# Patient Record
Sex: MALE | Race: WHITE | NOT HISPANIC OR LATINO | Employment: FULL TIME | URBAN - METROPOLITAN AREA
[De-identification: names, ages, dates, MRNs, and addresses within clinical notes are randomized per-mention and may not be internally consistent; named-entity substitution may affect disease eponyms.]

---

## 2017-02-27 ENCOUNTER — TELEPHONE (OUTPATIENT)
Dept: NEUROLOGY | Facility: HOSPITAL | Age: 37
End: 2017-02-27

## 2017-02-27 NOTE — TELEPHONE ENCOUNTER
Spoke w/ pt and wife, appt scheduled for 3/29 at 1:30. Requesting earlier appt. Will route message to Dr. Todd for approval.

## 2017-02-27 NOTE — TELEPHONE ENCOUNTER
----- Message from Jessica Gupta sent at 2/24/2017  3:09 PM CST -----  Contact: Pt wife  Pt wife called to schedule an appt for the the above pt due to digestive issues.     Pt wife stated pt is losing weight and she feels he should be seen as soon as possible .     Pt wife can be contacted at [phone]

## 2017-03-13 ENCOUNTER — TELEPHONE (OUTPATIENT)
Dept: NEUROLOGY | Facility: HOSPITAL | Age: 37
End: 2017-03-13

## 2017-03-13 NOTE — TELEPHONE ENCOUNTER
----- Message from Radha Jacinto sent at 3/13/2017 12:29 PM CDT -----  GI - Patients insurance will not be effective until 4/1 but we do not have an appointment until 4/12. Please reschedule the patient earlier but after 4/1 if possible. Please call back grecia Torrefany at [phone].

## 2017-04-12 ENCOUNTER — OFFICE VISIT (OUTPATIENT)
Dept: NEUROLOGY | Facility: HOSPITAL | Age: 37
End: 2017-04-12
Attending: INTERNAL MEDICINE
Payer: COMMERCIAL

## 2017-04-12 VITALS
SYSTOLIC BLOOD PRESSURE: 109 MMHG | TEMPERATURE: 98 F | HEIGHT: 67 IN | DIASTOLIC BLOOD PRESSURE: 72 MMHG | WEIGHT: 128 LBS | HEART RATE: 59 BPM | BODY MASS INDEX: 20.09 KG/M2

## 2017-04-12 DIAGNOSIS — R63.4 WEIGHT LOSS: Primary | ICD-10-CM

## 2017-04-12 PROCEDURE — 99215 OFFICE O/P EST HI 40 MIN: CPT | Performed by: INTERNAL MEDICINE

## 2017-04-12 RX ORDER — ASCORBIC ACID 1000 MG
250 TABLET ORAL 3 TIMES DAILY
COMMUNITY
End: 2018-08-26

## 2017-04-12 RX ORDER — IBUPROFEN 100 MG/5ML
1000 SUSPENSION, ORAL (FINAL DOSE FORM) ORAL DAILY
COMMUNITY
End: 2018-08-26

## 2017-04-12 RX ORDER — PNV NO.95/FERROUS FUM/FOLIC AC 28MG-0.8MG
1000 TABLET ORAL 2 TIMES DAILY
COMMUNITY
End: 2018-08-26

## 2017-04-12 RX ORDER — SELENIUM 50 MCG
200 TABLET ORAL DAILY
COMMUNITY
End: 2018-08-26

## 2017-04-12 NOTE — MR AVS SNAPSHOT
[facility]  [address] [place] [address]  Phone: [phone]  Fax: [phone]                  Casper Fenton   2017 9:45 AM   Office Visit    Description:  Male : 1980   Provider:  Randell Todd MD   Department:  [facility]           Reason for Visit     Follow-up                To Do List           Goals (5 Years of Data)     None      [facility]     [facility] Care Line -  Assistance  Unless otherwise directed by your provider, please contact [facility], our nurse care line that is available for  assistance.     Registered nurses in the [facility] provide: appointment scheduling, clinical advisement, health education, and other advisory services.  Call: [phone] (toll free)               Medications           Message regarding Medications     Verify the changes and/or additions to your medication regime listed below are the same as discussed with your clinician today.  If any of these changes or additions are incorrect, please notify your healthcare provider.             Verify that the below list of medications is an accurate representation of the medications you are currently taking.  If none reported, the list may be blank. If incorrect, please contact your healthcare provider. Carry this list with you in case of emergency.           Current Medications     ACETYLCYSTEINE (N-ACETYL-L-CYSTEINE MISC) by Misc.(Non-Drug; Combo Route) route once daily.    ascorbic acid, vitamin C, (VITAMIN C) 1000 MG tablet Take 1,000 mg by mouth once daily.    cetirizine 10 mg TbDL Take by mouth as needed.    ENZYMES,DIGESTIVE (DIGESTIVE ENZYMES ORAL) Take by mouth 3 (three) times daily.    milk thistle 175 mg tablet Take 250 mg by mouth 3 (three) times daily.    multivitamin (ONE DAILY MULTIVITAMIN) per tablet Take 1 tablet by mouth once daily.    selenium 50 mcg Tab Take 200 mcg by mouth once daily.     "testosterone cypionate (DEPOTESTOTERONE CYPIONATE) 200 mg/mL injection Inject 200 mg into the muscle every 14 (fourteen) days.    VITAMIN B COMPLEX/FOLIC ACID (B COMPLEX 100 ORAL) Take by mouth once daily.    vitamin E 1000 UNIT capsule Take 1,000 Units by mouth 2 (two) times daily.           Clinical Reference Information           Your Vitals Were     BP Pulse Temp Height Weight BMI    109/72 59 97.9 °F (36.6 °C) (Oral) 5' 7" (1.702 m) 58.1 kg (128 lb) 20.05 kg/m2      Blood Pressure          Most Recent Value    BP  109/72      Allergies as of 4/12/2017     Kiwi (Actinidia Chinensis)      Immunizations Administered on Date of Encounter - 4/12/2017     None      Instructions    RX sent to pharmacy        Language Assistance Services     ATTENTION: Language assistance services are available, free of charge. Please call [phone].      ATENCIÓN: Si habla español, tiene a major disposición servicios gratuitos de asistencia lingüística. Llame al [phone].     [facility] Ý: N?u b?n nói Ti?ng Vi?t, có các d?ch v? h? tr? ngôn ng? mi?n phí dành cho b?n. G?i s? [phone].         [facility] complies with applicable Federal civil rights laws and does not discriminate on the basis of race, color, national origin, age, disability, or sex.        "

## 2017-04-12 NOTE — PROGRESS NOTES
"[facility]    CC: weight loss    HPI   Mr. Fenton is a 36 year old man presenting with persistent, significant, painless weight loss not associated with diarrhea and early satiety. He has a history of Savana-en-Y for congenital jejunal atresia but has not had problems until the last few years when he started losing weight and even lost his job as a  because of decreased exercise tolerance. He was always 180 but is down to 128.     He denies abdominal pain, nausea, or vomiting. He notes that he gained some weight since the last visit with initiation of testosterone, but since lost his insurance and had to stop. He started losing weight again. He recently restarted testosterone 2 months ago and he notices an increase in appetite shortly after the injections. He feels that he is physically unable to eat more at eat meal because he gets full, but is still eating 4 meals a day. About 1 month ago, he started having daily diarrhea that is non-bloody and not associated with cramping. If he doesn't eat, the diarrhea stops, and it rarely occurs at night.     His diet includes milk daily, about 5 cokes a day, and wheat. He uses chewing tobacco daily. He denies NSAIDs or alcohol.     PMH  Celiac Disease  Hypogonadism  Jejunal atresia s/p Savana-en-Y  Tobacco use  Remote heavy alcohol use    Review of Systems  General ROS: no chills, fever Positive for weight loss, seasonal allergies, decreased exercise tolerance  Cardiovascular ROS: no chest pain or dyspnea on exertion  Gastrointestinal ROS: no abdominal pain or black/ bloody stools Positive for early satiety, non-bloody diarrhea    Physical Examination  /72  Pulse (!) 59  Temp 97.9 °F (36.6 °C) (Oral)   Ht 5' 7" (1.702 m)  Wt 58.1 kg (128 lb)  BMI 20.05 kg/m2  General appearance: alert, cooperative, no distress, thin but not cachectic  HENT: Normocephalic, atraumatic, neck symmetrical, no nasal discharge  Lungs: clear to auscultation bilaterally, " slight rhonchi in posterior upper lung fields that cleared with cough  Heart: regular rate and rhythm without rub; normal S1/S2 appreciated  Abdomen: soft, non-tender; bowel sounds normoactive; no organomegaly, well healed midline and RUQ scar   Extremities: extremities symmetric; no clubbing, cyanosis, or edema, nail changes noted  Neurologic: Alert and oriented X 3, normal strength, normal coordination and gait    Labs:  2/25/16  WBC 9.06  H/H 14.2/43.2  INR 1.5  AST 68, ,     Imaging:  No new imaging recently but I have personally reviewed the following images.    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 He had weight gain in the past with testosterone, stopped it and lost weight, but has recently resumed it. He is taking supplemental pancreatic enzymes OTC and many vitamins. He is also drinking milk daily as well as 5 cokes daily. His diarrhea improves when he doesn't eat and he complains of early satiety which has started in the last year. Weight self-reported as 180 a few years ago, then 140 at last visit and 128 at this visit.    Impression:   Recurrent weight loss - likely related to a combination of malabsorption related to previous intestinal surgery vs lactose intolerance plus early satiety plus chronic/recurrent anorexia     Recurrent diarrhea suspicious for malabsorption - due to relative pancreatic exocrine insufficiency vs lactose intolerance vs celiac?        Plan:  - instructed to keep a lactose free diet for 2 weeks and call with an  update  - will give prescription for pancreatic enzyme replacement 30,000 units per meal to replace supplemental enzymes  - instructed to take a liquid multivitamin once daily instead of large number of various vitamin supplements   - minimize nicotine use if possible  - continue testosterone supplements as this has improved his weight loss in the past   Reassess in two weeks. If his diarrhea improves on a lactose free diet, he should avoid lactose. If his diarrhea fails to improve, I will plan repeat testing for celiac disease (previous positive HLA DQ2 and reported positive anti TTG IgG) including both IgA and IgG testing for anti-TTG.   If his symptom of early satiety persists, I will consider an EGD to evaluate for stenosis of the gastrojejunostomy then potentially an appetite stimulant as next step       Randell Todd MD   [address], [address]   [place] [address] [phone]

## 2017-04-13 ENCOUNTER — TELEPHONE (OUTPATIENT)
Dept: NEUROLOGY | Facility: HOSPITAL | Age: 37
End: 2017-04-13

## 2017-04-13 NOTE — TELEPHONE ENCOUNTER
----- Message from Sandra Ferrell sent at 4/13/2017 10:09 AM CDT -----  Contact: Patients grecia SANFORD- Patient would like a recommendation for a neurologist. Brittaney can be reached at [phone].

## 2017-04-13 NOTE — TELEPHONE ENCOUNTER
Gave her information for Neurologist in the [facility] via [facility] web. She also states rx for pancreatic enzymes were not sent to pharmacy. Message sent to Dr Todd

## 2017-04-18 ENCOUNTER — TELEPHONE (OUTPATIENT)
Dept: NEUROLOGY | Facility: CLINIC | Age: 37
End: 2017-04-18

## 2017-04-18 NOTE — TELEPHONE ENCOUNTER
----- Message from Thomas Ochoa sent at 4/13/2017 11:53 AM CDT -----  Contact: Brittaney ( spouse ) [phone]  Caller is calling to schedule a NP appt, patient is being referred by Dr. Randell Todd. pls call

## 2017-06-15 ENCOUNTER — TELEPHONE (OUTPATIENT)
Dept: NEUROLOGY | Facility: HOSPITAL | Age: 37
End: 2017-06-15

## 2017-06-15 NOTE — TELEPHONE ENCOUNTER
Wife, Brittaney, wants to know if he can take Valarian Root. With it cause problems with enzymes presently taking.  Pt recently diagnosed with PTSD.  Doing well on enzymes, gaining a little weight.  Also taking otc multivitamins.

## 2017-06-15 NOTE — TELEPHONE ENCOUNTER
----- Message from Radha Jacinto sent at 6/15/2017  8:34 AM CDT -----  GI- Patients wife Brittaney is calling in regards to her husbands status on the enzymes. Please call back at [phone] to assist.

## 2017-06-19 ENCOUNTER — TELEPHONE (OUTPATIENT)
Dept: NEUROLOGY | Facility: HOSPITAL | Age: 37
End: 2017-06-19

## 2017-06-19 NOTE — TELEPHONE ENCOUNTER
----- Message from Sandra Ferrell sent at 6/19/2017  9:55 AM CDT -----  Contact: Patients wife, Brittaney  GI- Patients wife, rBittaney is calling back again to see if the patient can take Zelarian Root in addition to the enzymes the patient is on.  Patient left a messaged Thursday morning and would like a call back today. Please call the patient back as soon as possible at [phone].

## 2017-06-19 NOTE — TELEPHONE ENCOUNTER
Brittaney notified its okay to take Mallorie Root per Dr. Todd.  Brittaney acknowledged understanding.

## 2018-05-23 ENCOUNTER — OFFICE VISIT (OUTPATIENT)
Dept: NEUROLOGY | Facility: HOSPITAL | Age: 38
End: 2018-05-23
Attending: INTERNAL MEDICINE
Payer: MEDICAID

## 2018-05-23 VITALS
DIASTOLIC BLOOD PRESSURE: 64 MMHG | BODY MASS INDEX: 19.96 KG/M2 | HEIGHT: 67 IN | HEART RATE: 63 BPM | TEMPERATURE: 98 F | SYSTOLIC BLOOD PRESSURE: 104 MMHG | WEIGHT: 127.19 LBS

## 2018-05-23 DIAGNOSIS — G62.9 PERIPHERAL POLYNEUROPATHY: Primary | ICD-10-CM

## 2018-05-23 PROCEDURE — 99214 OFFICE O/P EST MOD 30 MIN: CPT | Performed by: INTERNAL MEDICINE

## 2018-05-23 NOTE — PATIENT INSTRUCTIONS
Referral to [facility] Dr. Randell Berry, clinic will contact.      New medication sent to your pharmacy.

## 2018-05-23 NOTE — PROGRESS NOTES
"[facility]    CC: weight loss     HPI 35 y.o. 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  He reports 2-3 loose to soft bowel movements daily while taking Creon supplementation. Creon improved the number of bowel movements from 4-6 to about 2-3. He did not try lactose free diet and has not seen any changes in loose stool with dairy products. He also takes vitamin B12 and multivitamin. He states that it has been difficult for him to gain weight despite nutritional supplementation. He has tried vitamin shakes in the past but only notes significant improvement after receiving testosterone supplementation. He does states mild neuropathy in hands and difficulty with steady gait and ambulation.     Past Medical History:   Diagnosis Date    Allergy     Asthma     Short bowel syndrome      Review of Systems  General ROS: negative for chills, fever or weight loss, +fatigue, +lethargy,   Cardiovascular ROS: no chest pain or dyspnea on exertion  Gastrointestinal ROS: no abdominal pain, +loose stools improved on enzyme supplementation, no blood in stool    Physical Examination  /64   Pulse 63   Temp 97.7 °F (36.5 °C) (Oral)   Ht 5' 7" (1.702 m)   Wt 57.7 kg (127 lb 3.3 oz)   BMI 19.92 kg/m²   General appearance: alert, cooperative, no distress, thin, cachectic male  HENT: Normocephalic, atraumatic, neck symmetrical, no nasal discharge   Lungs: clear to auscultation bilaterally, no dullness to percussion bilaterally  Heart: regular rate and rhythm without rub; no displacement of the PMI   Abdomen: soft, non-tender; bowel sounds normoactive; no organomegaly  Extremities: extremities symmetric; no clubbing, cyanosis, or edema  Neurologic: Alert and oriented X 3, normal strength, normal " coordination and gait    Labs:  Lab Results   Component Value Date    WBC 9.06 02/25/2016    HGB 14.2 02/25/2016    HCT 43.2 02/25/2016    MCV 90 02/25/2016     02/25/2016       Imaging:  US Abd:   Mild splenomegaly  Trace ascites    Assessment:   36 yo man with 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 He was diagnosed with celiac disease endoscopically (Marsh 1 lesion) and elevated TTG IgG.  Genetic testing for celiac is inconclusive as he has one HLA DQ allele but not both. He had work-up of abnormal liver chemistries in the past in Hepatology clinic and underwent liver biopsy which showed mild hepatitis but no fibrosis.  His elevated INR is likely related to Vit K malabsorption from his previous surgery. Diarrhea has improved after pancreatic enzyme replacement but he stopped this therapy a few months ago. It appears that this patient has intestinal malabsorption and anorexia. His weight has been up and down over the past 10 years.      Plan:  -Continue pancreatic enzyme replacement 60,000 units per meal to replace supplemental enzymes  -Continue liquid multivitamin once daily   -Continue testosterone supplements as this has improved his weight loss in the past   -Will plan referral to neurology for nerve pain in upper extremities      Randell Todd MD   [address], [address]   [place] [address] [phone]

## 2018-05-25 ENCOUNTER — TELEPHONE (OUTPATIENT)
Dept: NEUROLOGY | Facility: HOSPITAL | Age: 38
End: 2018-05-25

## 2018-05-25 NOTE — TELEPHONE ENCOUNTER
Brittaney, wife, and pt are requesting a referral to Dr. Ilan Andrews, Rheumatology, in [place] for Vitamin Deficiency.  Brittaney notified request to be forwarded to Dr. Todd for approval.

## 2018-05-25 NOTE — TELEPHONE ENCOUNTER
----- Message from Sandra Ferrell sent at 5/25/2018 10:50 AM CDT -----  Contact: Patients wife, Brittaney  GI--Patients wife, Brittaney is calling for a referral to Dr. Ilan Rasmussen Rheumatology for Vitamin Deficiency issues to be fax to [phone].  Call back number is [phone].

## 2018-05-30 ENCOUNTER — TELEPHONE (OUTPATIENT)
Dept: NEUROLOGY | Facility: HOSPITAL | Age: 38
End: 2018-05-30

## 2018-05-30 ENCOUNTER — TELEPHONE (OUTPATIENT)
Dept: RHEUMATOLOGY | Facility: CLINIC | Age: 38
End: 2018-05-30

## 2018-05-30 DIAGNOSIS — E56.9 VITAMIN DEFICIENCY: Primary | ICD-10-CM

## 2018-05-30 NOTE — TELEPHONE ENCOUNTER
----- Message from Cara Londono sent at 5/29/2018 10:29 AM CDT -----  Contact: Patient's wife, Brittaney  Patient's wife is calling to see if the office received patient's referral from Dr. Todd office.  Patient's wife is calling to schedule an appointment once this is received.  Call Back#[phone]  Thanks

## 2018-06-01 ENCOUNTER — TELEPHONE (OUTPATIENT)
Dept: RHEUMATOLOGY | Facility: CLINIC | Age: 38
End: 2018-06-01

## 2018-06-02 NOTE — TELEPHONE ENCOUNTER
----- Message from Pierre Billingsley sent at 6/1/2018  1:08 PM CDT -----  Contact: Wife  Brittaney, [phone]. Calling to book new patient appointment. Referral in system. Please advise. Thanks.

## 2018-06-04 NOTE — TELEPHONE ENCOUNTER
Pt offered appts in both [place] and [place]. Pt wife states that she will call back if she cannot find someone with an appointment sooner than those first available dates.

## 2018-06-05 ENCOUNTER — TELEPHONE (OUTPATIENT)
Dept: NEUROLOGY | Facility: HOSPITAL | Age: 38
End: 2018-06-05

## 2018-06-05 NOTE — TELEPHONE ENCOUNTER
Brittaney, wife, request Dr. Todd do vitamin deficiency workup on pt.  Per Brittaney, Dr. Andrews's office offered appt in the fall and she does not feel he should wait that long.  Brittaney notified Dr. Todd is not a Rheumatologist, request will be forwarded.  Pt advised to make appt with Rheumatology and request to be placed on wait list.  Pt acknowledged understanding.

## 2018-06-05 NOTE — TELEPHONE ENCOUNTER
----- Message from Sandra Ferrell sent at 6/5/2018  8:51 AM CDT -----  Contact: Patients grecia SANFORD- Patient needs help in regards to patients Vitamin Deficiency. Please call Brittaney at [phone].

## 2018-06-08 ENCOUNTER — TELEPHONE (OUTPATIENT)
Dept: NEUROLOGY | Facility: CLINIC | Age: 38
End: 2018-06-08

## 2018-06-08 NOTE — TELEPHONE ENCOUNTER
----- Message from Osman Abbott sent at 6/7/2018  4:15 PM CDT -----  Contact: wife Brittaney   Type:  Sooner Apoointment Request    Caller is requesting a sooner appointment.  Caller declined first available appointment listed below.  Caller will not accept being placed on the waitlist and is requesting a message be sent to doctor.    Name of Caller: Renata Quispe   When is the first available appointment?  8/14  Symptoms: ptsd, weight loss, some numbness sometimes  Best Call Back Number: [phone]  Additional Information: Wife is requesting a appointment asap, thanks

## 2018-06-12 ENCOUNTER — TELEPHONE (OUTPATIENT)
Dept: NEUROLOGY | Facility: CLINIC | Age: 38
End: 2018-06-12

## 2018-06-12 NOTE — TELEPHONE ENCOUNTER
Returned phone call, no answer. Left message to return my call.       ----- Message from Lorena Sheehan sent at 6/12/2018  9:39 AM CDT -----  Contact: Pt wife Fenton:[phone]  Patient Requesting Sooner Appointment.     Reason for sooner appt.:Pt missed a call from the nurse to reschedule a sooner  When is the first available appointment?08/14/2018  Communication Preference:Telephone  Additional Information:

## 2018-08-14 PROBLEM — Z98.84 HISTORY OF ROUX-EN-Y GASTRIC BYPASS: Status: ACTIVE | Noted: 2018-08-14

## 2018-08-24 ENCOUNTER — TELEPHONE (OUTPATIENT)
Dept: HEPATOLOGY | Facility: CLINIC | Age: 38
End: 2018-08-24

## 2018-08-24 NOTE — TELEPHONE ENCOUNTER
MA called patient spoke to patient wife she would like for her  to be seen ASAP! She said that patient is not doing well at all she want him to be seen. Schedule patient 8/28 [facility] clinic with Dr. Sierra.

## 2018-08-24 NOTE — TELEPHONE ENCOUNTER
----- Message from Beni Munoz sent at 8/24/2018  3:56 PM CDT -----  Contact: self  Pt called in about wanting to schedule appt  Pt would like the nurse to give him a call back        Pt can be reached at [phone]        TY

## 2018-08-26 PROBLEM — R13.12 OROPHARYNGEAL DYSPHAGIA: Status: ACTIVE | Noted: 2018-08-26

## 2018-08-26 PROBLEM — R74.8 ELEVATED CK: Status: ACTIVE | Noted: 2018-08-26

## 2018-08-26 PROBLEM — R62.7 FAILURE TO THRIVE IN ADULT: Status: ACTIVE | Noted: 2018-08-26

## 2018-08-26 PROBLEM — R53.1 GENERALIZED WEAKNESS: Status: ACTIVE | Noted: 2018-08-26

## 2018-08-26 PROBLEM — E86.0 DEHYDRATION: Status: ACTIVE | Noted: 2018-08-26

## 2018-08-29 PROBLEM — R13.12 OROPHARYNGEAL DYSPHAGIA: Status: RESOLVED | Noted: 2018-08-26 | Resolved: 2018-08-29

## 2018-08-29 PROBLEM — E86.0 DEHYDRATION: Status: RESOLVED | Noted: 2018-08-26 | Resolved: 2018-08-29

## 2018-08-30 ENCOUNTER — TELEPHONE (OUTPATIENT)
Dept: SURGERY | Facility: CLINIC | Age: 38
End: 2018-08-30

## 2018-09-05 DIAGNOSIS — R53.1 WEAKNESS: Primary | ICD-10-CM

## 2018-09-07 PROBLEM — E53.0: Status: ACTIVE | Noted: 2018-09-07

## 2018-09-07 PROBLEM — E61.0 CHRONIC COPPER DEFICIENCY: Status: ACTIVE | Noted: 2018-09-07

## 2018-09-07 PROBLEM — E55.9 VITAMIN D DEFICIENCY: Status: ACTIVE | Noted: 2018-09-07

## 2018-09-07 PROBLEM — E53.1 VITAMIN B6 DEFICIENCY: Status: ACTIVE | Noted: 2018-09-07

## 2018-09-07 PROBLEM — G62.9 NEUROPATHY: Status: ACTIVE | Noted: 2018-09-07

## 2018-09-10 ENCOUNTER — LAB VISIT (OUTPATIENT)
Dept: LAB | Facility: HOSPITAL | Age: 38
End: 2018-09-10
Attending: SURGERY
Payer: MEDICAID

## 2018-09-10 ENCOUNTER — INITIAL CONSULT (OUTPATIENT)
Dept: SURGERY | Facility: CLINIC | Age: 38
End: 2018-09-10
Payer: MEDICAID

## 2018-09-10 VITALS
DIASTOLIC BLOOD PRESSURE: 75 MMHG | BODY MASS INDEX: 19.75 KG/M2 | WEIGHT: 126.13 LBS | RESPIRATION RATE: 18 BRPM | HEART RATE: 72 BPM | TEMPERATURE: 97 F | SYSTOLIC BLOOD PRESSURE: 113 MMHG

## 2018-09-10 DIAGNOSIS — K91.89: ICD-10-CM

## 2018-09-10 DIAGNOSIS — K90.9 INTESTINAL MALABSORPTION, UNSPECIFIED TYPE: ICD-10-CM

## 2018-09-10 DIAGNOSIS — R62.7 FAILURE TO THRIVE IN ADULT: Primary | ICD-10-CM

## 2018-09-10 DIAGNOSIS — R62.7 FAILURE TO THRIVE IN ADULT: ICD-10-CM

## 2018-09-10 LAB
ALBUMIN SERPL BCP-MCNC: 3.5 G/DL
ALP SERPL-CCNC: 105 U/L
ALT SERPL W/O P-5'-P-CCNC: 97 U/L
ANION GAP SERPL CALC-SCNC: 9 MMOL/L
AST SERPL-CCNC: 49 U/L
BASOPHILS # BLD AUTO: 0.05 K/UL
BASOPHILS NFR BLD: 0.5 %
BILIRUB SERPL-MCNC: 1.3 MG/DL
BUN SERPL-MCNC: 9 MG/DL
CALCIUM SERPL-MCNC: 8.6 MG/DL
CHLORIDE SERPL-SCNC: 108 MMOL/L
CO2 SERPL-SCNC: 25 MMOL/L
CREAT SERPL-MCNC: 0.6 MG/DL
DIFFERENTIAL METHOD: ABNORMAL
EOSINOPHIL # BLD AUTO: 0.4 K/UL
EOSINOPHIL NFR BLD: 4.4 %
ERYTHROCYTE [DISTWIDTH] IN BLOOD BY AUTOMATED COUNT: 14.9 %
EST. GFR  (AFRICAN AMERICAN): >60 ML/MIN/1.73 M^2
EST. GFR  (NON AFRICAN AMERICAN): >60 ML/MIN/1.73 M^2
GLUCOSE SERPL-MCNC: 77 MG/DL
HCT VFR BLD AUTO: 35.5 %
HGB BLD-MCNC: 11.5 G/DL
IMM GRANULOCYTES # BLD AUTO: 0.03 K/UL
IMM GRANULOCYTES NFR BLD AUTO: 0.3 %
INR PPP: 1.1
LYMPHOCYTES # BLD AUTO: 2.3 K/UL
LYMPHOCYTES NFR BLD: 24.5 %
MCH RBC QN AUTO: 30.4 PG
MCHC RBC AUTO-ENTMCNC: 32.4 G/DL
MCV RBC AUTO: 94 FL
MONOCYTES # BLD AUTO: 0.5 K/UL
MONOCYTES NFR BLD: 5 %
NEUTROPHILS # BLD AUTO: 6.2 K/UL
NEUTROPHILS NFR BLD: 65.3 %
NRBC BLD-RTO: 0 /100 WBC
PLATELET # BLD AUTO: 306 K/UL
PMV BLD AUTO: 9.9 FL
POTASSIUM SERPL-SCNC: 3.9 MMOL/L
PREALB SERPL-MCNC: 14 MG/DL
PROT SERPL-MCNC: 6 G/DL
PROTHROMBIN TIME: 11.1 SEC
RBC # BLD AUTO: 3.78 M/UL
SODIUM SERPL-SCNC: 142 MMOL/L
WBC # BLD AUTO: 9.47 K/UL

## 2018-09-10 PROCEDURE — 99999 PR PBB SHADOW E&M-EST. PATIENT-LVL III: CPT | Mod: PBBFAC,,, | Performed by: SURGERY

## 2018-09-10 PROCEDURE — 80053 COMPREHEN METABOLIC PANEL: CPT

## 2018-09-10 PROCEDURE — 85025 COMPLETE CBC W/AUTO DIFF WBC: CPT

## 2018-09-10 PROCEDURE — 85610 PROTHROMBIN TIME: CPT

## 2018-09-10 PROCEDURE — 99204 OFFICE O/P NEW MOD 45 MIN: CPT | Mod: S$PBB,,, | Performed by: SURGERY

## 2018-09-10 PROCEDURE — 36415 COLL VENOUS BLD VENIPUNCTURE: CPT

## 2018-09-10 PROCEDURE — 84134 ASSAY OF PREALBUMIN: CPT

## 2018-09-10 PROCEDURE — 99213 OFFICE O/P EST LOW 20 MIN: CPT | Mod: PBBFAC | Performed by: SURGERY

## 2018-09-10 NOTE — PROGRESS NOTES
History & Physical    SUBJECTIVE:     History of Present Illness:  Patient is a 37 y.o. male with h/o congenital intestinal atresia s/p bypass referred for failure to thrive and dilation of the bypassed duodenum. He has experienced persistent 40lb weight loss for the past 5 years. He has bloating and rapid transit diarrhea which is occasionally steatorrheic. Denies vomiting, nausea, regurgitation, or abdominal pain. He was adopted and they are unsure of his medical history. It would appear that he has had a duodeno-jejunal bypass of either a distal duodenal or proximal jejunal atresia. He is also having fatigue that has kept him from his job as a  from the past 2 years and neuropathy.     He has pancreatic division with mild dilation in his HPB tree; his bilirubin has come down in the past few weeks to normal. He denies h/o jaundice.     Chief Complaint   Patient presents with    Consult       Review of patient's allergies indicates:   Allergen Reactions    Kiwi (actinidia chinensis)        Current Outpatient Medications   Medication Sig Dispense Refill    B complx-C-folic-zinc-copper-E 500 mg-400 mcg- 24 mg-3 mg Tab Take 1 capsule by mouth every 12 (twelve) hours. 60 tablet 11    multivitamin-min-iron-FA-vit K 18 mg iron-400 mcg-25 mcg Tab Take 1 tablet by mouth once daily. 90 tablet 0    pantoprazole (PROTONIX) 40 MG tablet Take 1 tablet (40 mg total) by mouth once daily. 30 tablet 1    sucralfate (CARAFATE) 1 gram tablet Take 1 tablet (1 g total) by mouth 4 (four) times daily. 120 tablet 0    testosterone cypionate (DEPOTESTOTERONE CYPIONATE) 200 mg/mL injection Inject 200 mg into the muscle every 14 (fourteen) days.      thiamine 100 MG tablet Take 1 tablet (100 mg total) by mouth once daily.       No current facility-administered medications for this visit.        Past Medical History:   Diagnosis Date    Allergy     Asthma     Pancreas divisum     Short bowel syndrome      Past Surgical  History:   Procedure Laterality Date    BIOPSY LIVER AND ULTRASOUND N/A 3/1/2016    [facility]    DILATATION Bilateral 8/27/2018    [facility] at [facility]    EGD with push enteroscopy N/A 12/7/2015    Performed by [facility]ell [facility] at [facility] ENDO    ESOPHAGOGASTRODUODENOSCOPY N/A 8/27/2018    Procedure: ESOPHAGOGASTRODUODENOSCOPY (EGD);  Surgeon: León Malhotra Jr., MD;  Location: [facility];  Service: Endoscopy;  Laterality: N/A;    ESOPHAGOGASTRODUODENOSCOPY (EGD) N/A 8/27/2018    Performed by [facility] at [facility]    SMALL INTESTINE SURGERY      1985    wisdom tooth removal       Family History   Problem Relation Age of Onset    Diabetes Mother     Stroke Mother     Heart disease Father     Stroke Maternal Grandmother      Social History     Tobacco Use    Smoking status: Former Smoker     Packs/day: 1.00     Years: 5.00     Pack years: 5.00     Types: Cigarettes    Smokeless tobacco: Current User    Tobacco comment: 1 can chew tobacco daily   Substance Use Topics    Alcohol use: No     Alcohol/week: 0.0 oz    Drug use: No        Review of Systems:  Review of Systems   Constitutional: Positive for activity change and appetite change. Negative for chills and fever.   HENT: Negative for congestion and trouble swallowing.    Eyes: Negative for visual disturbance.   Respiratory: Negative for cough and shortness of breath.    Cardiovascular: Negative for chest pain and leg swelling.   Gastrointestinal: Positive for diarrhea. Negative for abdominal distention, abdominal pain, constipation, nausea and vomiting.   Endocrine: Negative for cold intolerance and heat intolerance.   Genitourinary: Negative for difficulty urinating and dysuria.   Musculoskeletal: Positive for gait problem. Negative for arthralgias and back pain.   Skin: Negative for rash and wound.   Neurological: Negative for dizziness and headaches.    Psychiatric/Behavioral: Negative for agitation and behavioral problems.       OBJECTIVE:     Vital Signs (Most Recent)  Temp: 97.4 °F (36.3 °C) (09/10/18 1349)  Pulse: 72 (09/10/18 1349)  Resp: 18 (09/10/18 1349)  BP: 113/75 (09/10/18 1349)     57.2 kg (126 lb 1.7 oz)     Physical Exam:  Physical Exam   Constitutional: He is oriented to person, place, and time. No distress.   Thin  Body mass index is 19.75 kg/m².     Cardiovascular: Normal rate and regular rhythm. Exam reveals no gallop and no friction rub.   No murmur heard.  Pulmonary/Chest: Effort normal and breath sounds normal. No respiratory distress. He has no wheezes. He has no rales.   Abdominal: Soft. Bowel sounds are normal. He exhibits no distension. There is no tenderness. There is no rebound.   Musculoskeletal: Normal range of motion. He exhibits no edema.   Neurological: He is alert and oriented to person, place, and time.   Skin: Skin is warm and dry.   Psychiatric: He has a normal mood and affect. His behavior is normal.       Laboratory  Reviewed    Diagnostic Results:  Reviewed  CT scan 2014: I do not appreciate a malrotation.    ASSESSMENT/PLAN:     38yo with h/o proximal intestinal atresia s/p suspected duodeno-jejunal bypass with persistent dilation of the bypassed duodenum and severe bile reflux/stasis. Failure to thrive.    PLAN:Plan     Update CT scan, labs  RTC to discuss possible operative intervention. Either he has a partially functioning bypass or an incomplete atresia (web) since material does move through.    History obtained, patient examined, extensive outside records and imaging reviewed including barium UGI, MRI, old CT done in 2014, and recent EGD by Dr Malhotra.   He has a massively dilated D2-4 with a proximal DJ which, while anatomically open, does not appear to be emptying well from a functional standpoint. He has progressive, rather massive weight loss and multiple vitamin and mineral deficiencies suggesting malabsorption,  either from blind loop/bacterial overgrowth or due to lack of duodenal absorption.   Would the best option be to move the DJ down to the more dependent portion of the duodenum, or to resect the massively dilated duodenum below the ampulla and then reconstruct the DJ?   Will update CT scan and check nutritional status prior to surgery.

## 2018-09-10 NOTE — LETTER
September 10, 2018      León Malhotra Jr., MD  [address] [facility] [address]  [facility] [address]           [facility]  [facility] [facility] [address]  Phone: [phone]          Patient: Casper Fenton   MR Number: [id number]   YOB: 1980   Date of Visit: 9/10/2018       Dear Dr. León Malhotra Jr.:    Thank you for referring Casper Fenton to me for evaluation. Attached you will find relevant portions of my assessment and plan of care.    If you have questions, please do not hesitate to call me. I look forward to following Casper Fenton along with you.    Sincerely,    Yovany Terry MD    Enclosure  CC:  No Recipients    If you would like to receive this communication electronically, please contact [email or web address] or [phone] to request more information on [employer] access.    For providers and/or their staff who would like to refer a patient to [facility], please contact us through our one-stop-shop provider referral line, [facility], at [phone].    If you feel you have received this communication in error or would no longer like to receive these types of communications, please e-mail [email or web address]

## 2018-09-14 ENCOUNTER — HOSPITAL ENCOUNTER (OUTPATIENT)
Dept: RADIOLOGY | Facility: HOSPITAL | Age: 38
Discharge: HOME | End: 2018-09-14
Attending: SURGERY
Payer: MEDICAID

## 2018-09-14 DIAGNOSIS — R62.7 FAILURE TO THRIVE IN ADULT: ICD-10-CM

## 2018-09-14 DIAGNOSIS — K90.9 INTESTINAL MALABSORPTION, UNSPECIFIED TYPE: ICD-10-CM

## 2018-09-14 PROCEDURE — 25500020 PHARM REV CODE 255: Mod: PO | Performed by: SURGERY

## 2018-09-14 PROCEDURE — 74176 CT ABD & PELVIS W/O CONTRAST: CPT | Mod: 26,,, | Performed by: RADIOLOGY

## 2018-09-14 PROCEDURE — 74176 CT ABD & PELVIS W/O CONTRAST: CPT | Mod: TC,PO

## 2018-09-14 RX ADMIN — IOHEXOL 30 ML: 350 INJECTION, SOLUTION INTRAVENOUS at 12:09

## 2018-09-18 ENCOUNTER — LAB VISIT (OUTPATIENT)
Dept: LAB | Facility: HOSPITAL | Age: 38
End: 2018-09-18
Attending: PSYCHIATRY & NEUROLOGY
Payer: MEDICAID

## 2018-09-18 ENCOUNTER — OFFICE VISIT (OUTPATIENT)
Dept: NEUROLOGY | Facility: CLINIC | Age: 38
End: 2018-09-18
Payer: MEDICAID

## 2018-09-18 ENCOUNTER — TELEPHONE (OUTPATIENT)
Dept: NEUROLOGY | Facility: CLINIC | Age: 38
End: 2018-09-18

## 2018-09-18 VITALS
SYSTOLIC BLOOD PRESSURE: 110 MMHG | DIASTOLIC BLOOD PRESSURE: 61 MMHG | RESPIRATION RATE: 20 BRPM | BODY MASS INDEX: 19.46 KG/M2 | HEIGHT: 67 IN | HEART RATE: 70 BPM | WEIGHT: 124 LBS

## 2018-09-18 DIAGNOSIS — R20.0 NUMBNESS IN BOTH HANDS: ICD-10-CM

## 2018-09-18 DIAGNOSIS — R47.1 DYSARTHRIA: ICD-10-CM

## 2018-09-18 DIAGNOSIS — R74.8 HYPERCKEMIA: ICD-10-CM

## 2018-09-18 DIAGNOSIS — R53.1 WEAKNESS: Primary | ICD-10-CM

## 2018-09-18 DIAGNOSIS — J34.9 SINUS DISEASE: ICD-10-CM

## 2018-09-18 DIAGNOSIS — R13.10 DYSPHAGIA, UNSPECIFIED TYPE: ICD-10-CM

## 2018-09-18 DIAGNOSIS — R53.1 WEAKNESS: ICD-10-CM

## 2018-09-18 DIAGNOSIS — R90.89 ABNORMAL FINDING ON MRI OF BRAIN: ICD-10-CM

## 2018-09-18 LAB — CK SERPL-CCNC: 593 U/L

## 2018-09-18 PROCEDURE — 99214 OFFICE O/P EST MOD 30 MIN: CPT | Mod: PBBFAC,PN | Performed by: PSYCHIATRY & NEUROLOGY

## 2018-09-18 PROCEDURE — 99215 OFFICE O/P EST HI 40 MIN: CPT | Mod: S$PBB,,, | Performed by: PSYCHIATRY & NEUROLOGY

## 2018-09-18 PROCEDURE — 86235 NUCLEAR ANTIGEN ANTIBODY: CPT

## 2018-09-18 PROCEDURE — 30000890 ARUP MISCELLANEOUS TEST 1

## 2018-09-18 PROCEDURE — 82550 ASSAY OF CK (CPK): CPT

## 2018-09-18 PROCEDURE — 99999 PR PBB SHADOW E&M-EST. PATIENT-LVL IV: CPT | Mod: PBBFAC,,, | Performed by: PSYCHIATRY & NEUROLOGY

## 2018-09-18 NOTE — TELEPHONE ENCOUNTER
"This pt has been evaluated by Dr Lee and is being referred to ENT r/t most recent MRI. The report of MRI of brain states :"SINUSES: Marked left maxillary sinus mucosal thickening and opacity.  Small right maxillary sinus mucous retention cyst versus polyps.  Scattered mucosal thickening in the bilateral ethmoid sinuses.  The mastoid air cells are clear." However, the neurologist is concerned this may be something more serious, possibly a tumor. The pt has had marked weight loss and muscle weakness. Will you please see that this pt gets in with an ENT as soon as possible? The pt is willing to travel to [place] if necessary.  "

## 2018-09-18 NOTE — PROGRESS NOTES
NEUROLOGY  Outpatient CONSULT    [facility]  [address], [place] [address]  [phone] (office) / [phone] (fax)    Patient Name:  Casper Fenton  :  1980  MR #:  [id number]  Acct #:  [id number]    Date of Neurology Consult: 2018  Name of Neurologist: Neisha Lee D.O, ABPN, AOBNP, ABEM    Other Physicians:  Dayo Kingsley II, MD (Primary Care Physician); Ángel Cobb Jr., MD (Referring)      Chief Complaint: Hospital Follow Up; Numbness (juan antonio hands); and Fatigue      History of Present Illness (HPI):  Casper Fenton is a 37 y.o. male referred by Dr. Cobb for neuromuscular consultation for numbness and weakness.  He is here with his brother in law.  He assists in the history.  He has known him 20 years.  The family felt this was a fairly abrupt change, but then leveled off for a bit.  In the last year things seem to be getting worse.      Patient complains of numbness in the fingertips of all 5 fingers of both hands.  Occasionally it is the whole hand.  He feels that he has poor dexterity, he can't  a lori some days.  He states the severity will come and go.  This has been present for about 3 years.  He can still feel things.  He has no other regions of numbness.    He has noticed muscle weakness and fatigue.  This is in his legs and arms.  This has been present about 5 years now.  It started gradually.  He noticed there were some activities that were starting to get difficult.  He was a  for 8 years, but he felt that things were starting to feel heavy to him that didn't before.      He has had substantial weight loss about , this was unintentional.  He was not on any medication and was not sick at the time.  He states he has lost 60-80 pounds since .      He has poor balance, some days are worse than others.  He has not fallen.  He will lose his balance and have to catch himself.     About 2 weeks ago, he was having some  trouble with swallowing.  He felt things just would not go down.  He would have to concentrate.  This lasted about 2 days then passed.  He cannot figure out what caused it.      He has noticed that his speech is more slurred.  He received 2 DUIs, but these were cleared when they realized he was neurologically impaired, not with alcohol.  The speech started changing around 2014.      He has no family history of anything like this that he knows of.  He is adopted.    He has been seen by Dr. Cobb in neurology prior to this visit.  He was seen by gastroenterology.  His was for jejunal atresia as an infant.  There was concern that malabsorption was causing his current weight loss. He has been having chronic diarrhea.  He is scheduled to have surgery for this in October.  He has been working with his medical doctor.    He had no preceding illness with diarrhea or high fevers.  He did notice his weight loss came first.    Treatment to date:    N/A    Review of Systems:   General: Weight gain: No, Weight Loss: Yes, Fatigue: Yes,   Fever: No, Chills: No, Night Sweats: No, Insomnia: Yes, Excessive sleeping: Yes   Respiratory:  Cough: No, Shortness of Breath: No,   Wheezing: No, Excessive Snoring: No, Coughing up blood: No  Endocrine: Heat Intolerance: No, Cold Intolerance: No,   Excessive Thirst: No, Excessive Hunger: No,   Eyes:  Blurred Vision: No, Double Vision: No,   Light Sensitivity: No, Eye pain: No  Musculoskeletal: Muscle Aches/Pain: No, Joint Pain/Swelling: No, Muscle Cramps: No, Muscle Weakness: Yes, Neck Pain: No, Back Pain: No   Neurological: Difficulty Walking/Falls: Yes, Headache Migraine: No, Dizziness/Vertigo: Yes, Fainting: No, Difficulty with Speech: Yes, Weakness: Yes, Tingling/Numbness: Yes, Tremors: No, Memory Problems: Yes, Seizures: No, Difficulty Swallowing: No, Altered Taste: Yes.  Cardiovascular: Chest Pain: No, Shortness of Breath: No,   Palpitations: No,  Gastrointestinal: Nausea/Vomiting: No,  Constipation: No, Diarrhea: Yes, Bloody Stools: No   Psych/Cog:  Depression: Yes, Anxiety: Yes, Hallucinations: No, Problems Concentrating: Yes  : Frequent Urination: No, Incontinence: No, Blood of Urine: No, Urinary Infections: No, Changes in Sex Drive: No   ENT:Hearing Loss: No, Earache: No, Ringing in Ears: No,   Facial Pain: No, Chronic Congestion: No   Immune: Seasonal Allergies: Yes, Hives and/or Rashes: No  The remainder of the review of twelve body systems was reviewed and normal.    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    Pancreas divisum     Short bowel syndrome      Past Surgical History:   Procedure Laterality Date    BIOPSY LIVER AND ULTRASOUND N/A 3/1/2016    Performed by [facility] Provider at [facility] OR 2ND FLR    DILATATION Bilateral 8/27/2018    Performed by [facility]ón Malhotra Jr., [facility] at [facility]    EGD with push enteroscopy N/A 12/7/2015    Performed by [facility]ell [facility] at [facility] ENDO    ESOPHAGOGASTRODUODENOSCOPY N/A 8/27/2018    Procedure: ESOPHAGOGASTRODUODENOSCOPY (EGD);  Surgeon: León Malhotra Jr., MD;  Location: [facility];  Service: Endoscopy;  Laterality: N/A;    ESOPHAGOGASTRODUODENOSCOPY (EGD) N/A 8/27/2018    Performed by [facility]hotra Jr., [facility] at [facility]    SMALL INTESTINE SURGERY      1985    wisdom tooth removal       Family History   Problem Relation Age of Onset    Diabetes Mother     Stroke Mother     Heart disease Father     Stroke Maternal Grandmother      Alcohol use:  reports that he does not drink alcohol.   (Of note, 0.6 oz = 1 beer or 6 oz = 10 beers).  Tobacco use:  reports that he has quit smoking. His smoking use included cigarettes. He has a 5.00 pack-year smoking history. His smokeless tobacco use includes chew.  Street drug use:  reports that he does not use drugs.  Allergies: Kiwi  "(actinidia chinensis).    Home Medications:     Current Outpatient Medications:     B complx-C-folic-zinc-copper-E 500 mg-400 mcg- 24 mg-3 mg Tab, Take 1 capsule by mouth every 12 (twelve) hours., Disp: 60 tablet, Rfl: 11    multivitamin-min-iron-FA-vit K 18 mg iron-400 mcg-25 mcg Tab, Take 1 tablet by mouth once daily., Disp: 90 tablet, Rfl: 0    pantoprazole (PROTONIX) 40 MG tablet, Take 1 tablet (40 mg total) by mouth once daily., Disp: 30 tablet, Rfl: 1    sucralfate (CARAFATE) 1 gram tablet, Take 1 tablet (1 g total) by mouth 4 (four) times daily., Disp: 120 tablet, Rfl: 0    thiamine 100 MG tablet, Take 1 tablet (100 mg total) by mouth once daily., Disp: , Rfl:     testosterone cypionate (DEPOTESTOTERONE CYPIONATE) 200 mg/mL injection, Inject 200 mg into the muscle every 14 (fourteen) days., Disp: , Rfl:     Physical Examination:  /61   Pulse 70   Resp 20   Ht 5' 7" (1.702 m)   Wt 56.2 kg (124 lb)   BMI 19.42 kg/m²     GENERAL:  General appearance: Well, non-toxic appearing.  No apparent distress.  Fundi exam: normal.  Neck: supple.  Carotid auscultation: normal.  Heart auscultation: normal.  Peripheral pulses: normal.  Extremities: normal.    MENTAL STATUS:  Alertness, attention span & concentration: normal.  Language: normal.  Orientation to self, place & time:  normal.  Memory, recent & remote: normal.  Fund of knowledge: normal.    SPEECH:  Mild dysarthria with nasal quality, fluent  Follows complex commands.    CRANIAL NERVES:  Cranial Nerves II-XII were examined.  II - Visual fields: normal.  III, IV, VI: PERRL, EOMI, No ptosis, No nystagmus.  V - Facial sensation: normal.  VII - Face symmetry & mobility: normal.  VIII - Hearing: normal.  IX, X - Palate: mobile & midline.  XI - Shoulder shrug: normal.  XII - Tongue protrusion: normal.    GROSS MOTOR:  Gait & station: ataxia, drifting left frequently, normal foot placement, able to walk on toes and heels with balance issues.  Tone: " normal.  Abnormal movements: none.  Finger-nose & Heel-knee-shin: slight LUE ataxia .  Rapid alternating movements & drift: no drift, .  Romberg: absent    MUSCLE STRENGTH:     Fascics Atrophy RIGHT    LEFT Atrophy Fascics     5 Neck Ext. 5       5 Neck Flex 5       5 Deltoids 5       5 Sh.Ext.Rot. 5       5 Sh.Int.Rot. 5       5 Biceps 5       5 Triceps 5       4 Forearm.Pr. 4       4 Wrist Ext. 4       5 Wrist Flex 5       4 Finger Ext. 4       5 Finger Flex 5       5 FPL 5       4 Inteross. 4                         5 Iliopsoas 4       5 Hip Abduct 5       5 Hip Adduct 5       5 Quads 5       5 Hams 5       5 Dorsiflex 5       5 Plantar Flex 5       5 Ankle Luther 5       5 Ankle Invert 5       5 Toe Ext. 5       5 Toe Flex 5                         REFLEXES:    RIGHT Reflex   LEFT   0 Biceps 0   0 Brachiorad. 0   0 Triceps 0        0 Patellar 0   0 Ankle 0        Down PLANTAR Down     SENSORY:  Light touch: Normal throughout.  Sharp touch: diminished in all 5 fingers of both hands, worse on the index, returns at the palm  Vibration: present only in the left lower limb.      Diagnostic Data Reviewed:   Records were reviewed.  During recent hospitalization patient had a consultation with Neurology for weakness.  The weakness is generalized.  Nothing makes it better or worse.  He has paresthesias in the hands, unintentional weight loss, change in voice, gradually worsening memory, personality changes, difficulty walking. He has not had fasciculations, dysphagia, or difficulty breathing.  The etiology was felt to be unclear though his GI history could be suggestive of a malnutrition syndrome.  Inherited conditions such as as an a, SCA, or acquired myopathic processes were considered.  Mitochondrial disease was considered.  Unfortunately the patient was adopted so any medical history was unavailable.  MRI of the brain and C-spine were ordered.  It was suggested he have any EMG as an outpatient.  He may need a  biopsy.    I personally reviewed his imaging and test results.  The findings are below.  There is no definitive explanation for his symptoms.  The CK is consistently elevated, however, improved by over 50% over the course of his hospitalization.  His liver enzyme elevation is consistent with his elevated CK.  His copper and ceruloplasmin are low.    He had an endoscopy which revealed a normal esophagus.  Did have some reflux esophagitis that was biopsied.  There may have been evidence of Boateng's esophagus.  The esophagus was dilated.  There was some gastric mucosal atrophy.  Otherwise the stomach appeared normal. There was a patent duodenoenterostomy with healthy mucosa.  The duodenum was biopsied.    MRI brain:  1. No acute intracranial abnormality.  2. Left maxillary sinus disease.    MRI C spine:  Mild cervical spondylosis without high-grade spinal canal or foraminal narrowing.  No cord signal abnormality identified.    Component      Latest Ref Rng & Units 8/30/2018 8/29/2018 8/26/2018   CPK      55 - 170 U/L 1401 (H) 1504 (H) 3046 (H)       Component      Latest Ref Rng & Units 9/7/2018   Retinol, serum      0.30 - 1.20 mg/L 0.22 (L)   Retinyl Palmitate      0.00 - 0.10 mg/L <0.02   Retinol Interpretation       See Note   Vitamin C      23 - 114 umol/L 76     Component      Latest Ref Rng & Units 8/30/2018           4:58 AM   WBC      3.90 - 12.70 K/uL 9.55   RBC      4.60 - 6.20 M/uL 3.75 (L)   Hemoglobin      14.0 - 18.0 g/dL 11.1 (L)   Hematocrit      40.0 - 54.0 % 33.5 (L)   MCV      82 - 98 fL 89   MCH      27.0 - 31.0 pg 29.6   MCHC      32.0 - 36.0 g/dL 33.1   RDW      11.5 - 14.5 % 13.4   Platelets      150 - 350 K/uL 272   MPV      9.2 - 12.9 fL 9.6   Gran # (ANC)      1.8 - 7.7 K/uL 6.1   Lymph #      1.0 - 4.8 K/uL 1.8   Mono #      0.3 - 1.0 K/uL 0.7   Eos #      0.0 - 0.5 K/uL 0.9 (H)   Baso #      0.00 - 0.20 K/uL 0.06   nRBC      0 /100 WBC 0   Gran%      38.0 - 73.0 % 64.3   Lymph%      18.0 -  48.0 % 19.1   Mono%      4.0 - 15.0 % 7.1   Eosinophil%      0.0 - 8.0 % 8.9 (H)   Basophil%      0.0 - 1.9 % 0.6   Differential Method       Automated   Sodium      136 - 145 mmol/L 139   Potassium      3.5 - 5.1 mmol/L 4.1   Chloride      95 - 110 mmol/L 102   CO2      22 - 31 mmol/L 29   Glucose      70 - 110 mg/dL 90   BUN, Bld      9 - 21 mg/dL 6 (L)   Creatinine      0.50 - 1.40 mg/dL 0.46 (L)   Calcium      8.4 - 10.2 mg/dL 8.9   Total Protein      6.0 - 8.4 g/dL 6.0   Albumin      3.5 - 5.2 g/dL 3.3 (L)   Total Bilirubin      0.2 - 1.3 mg/dL 1.7 (H)   Alkaline Phosphatase      38 - 145 U/L 97   AST      17 - 59 U/L 92 (H)   ALT      10 - 44 U/L 186 (H)   Anion Gap      8 - 16 mmol/L 8   eGFR if African American      >60 mL/min/1.73 m:2 >60   eGFR if non African American      >60 mL/min/1.73 m:2 >60     Component      Latest Ref Rng & Units 8/30/2018 8/30/2018           8:07 PM  8:06 PM   Total Protein, Electrophoresis      6.00 - 8.30 g/dL 6.40    Albumin, Electrophoresis      3.75 - 5.01 g/dL 3.88    Alpha 1-Globulin, Electrophoresis      0.19 - 0.46 g/dL 0.44    Alpha 2-Globulin, Electrophoresis      0.48 - 1.05 g/dL 0.60    Beta-Globulin, Electrophoresis      0.48 - 1.10 g/dL 0.65    Gamma-Globulin, Electrophoresis      0.62 - 1.51 g/dL 0.84    SPE Interp.       See Note    EER Protein Electrophoresis       See Note    CPK      55 - 170 U/L 1401 (H)    Copper      70 - 140 ug/dL 31 (L)    Ceruloplasmin      17 - 54 mg/dL 9 (L)    CRP      0.00 - 0.90 mg/dL 0.70    Thiamine      70 - 180 nmol/L  266 (H)   HIV 1/2 Ag/Ab      Negative Negative    SHERLEY Screen      None Detected None Detected    Myoglobin      28 - 72 ng/mL 1,733 (H)    LD      313 - 618 U/L     PTH      9.0 - 77.0 pg/mL 41.0    Lyme Ab      0.00 - 1.20 VIVIAN 0.30      Component      Latest Ref Rng & Units 8/27/2018   GGT      8 - 78 U/L 12   IgA      68 - 408 mg/dL 85   Celiac Anit-Human Tissue Transglutaminase Iga      0 - 3 U/mL 0      Component      Latest Ref Rng & Units 8/27/2018 8/26/2018   Myeloperoxidase Ab      0 - 19 AU/mL  0   ANCA Proteinase 3      0 - 19 AU/mL  0   Sed Rate      0 - 14 mm/Hr 8    CRP, High Sensitivity      mg/L 2.57    Vitamin B1, Plasma      4 - 15 nmol/L 66 (H)    Vitamin B2      5 - 50 nmol/L 9    Zinc, Serum-ALT      60 - 120 ug/dL 71    RPR      Non-reactive  Non-reactive     Component      Latest Ref Rng & Units 8/26/2018 6/25/2018   Sodium      136 - 145 mmol/L 142    Potassium      3.5 - 5.1 mmol/L 4.2    Chloride      95 - 110 mmol/L 105    CO2      22 - 31 mmol/L 29    Glucose      70 - 110 mg/dL 100    BUN, Bld      9 - 21 mg/dL 10    Creatinine      0.50 - 1.40 mg/dL 0.48 (L)    Calcium      8.4 - 10.2 mg/dL 8.3 (L)    Total Protein      6.0 - 8.4 g/dL 5.9 (L)    Albumin      3.5 - 5.2 g/dL 3.4 (L)    Total Bilirubin      0.2 - 1.3 mg/dL 1.2    Alkaline Phosphatase      38 - 145 U/L 105    AST      17 - 59 U/L 126 (H)    ALT      10 - 44 U/L 209 (H)    Anion Gap      8 - 16 mmol/L 8    eGFR if African American      >60 mL/min/1.73 m:2 >60    eGFR if non African American      >60 mL/min/1.73 m:2 >60    Vit D, 25-Hydroxy      30 - 96 ng/mL  37   Vitamin B6      20.0 - 125.0 nmol/L  27.3   CPK      55 - 170 U/L 3046 (H)        Assessment and Plan:  Casper Fenton is a 37 y.o., man with a 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  His findings are predominantly lower motor neuron type with the addition of sensory disturbance.  He has an elevated CK suggesting a myopathic process.  The elevated myoglobin, AST, and ALT further support this.  He has low copper and ceruloplasmin level.  These can be seen in Elier's disease or malnutrition syndrome.  He does have some signs and symptoms that could be suggestive of Elier's disease, but lacks  the classic Kayser-Fleischer ring and T2 hyperintensities the basal ganglia of the brain.  Despite this, further exploration into this syndrome should be pursued.  He may ultimately need a liver biopsy.  His CK was falling throughout his hospitalization so we will recheck this to see what his baseline CK is.  Will also get a myositis panel including anti Ro antibodies, RNP antibodies, Alie 1 antibody, MI 2 antibody, PL 7 antibody, PL12 antibody, P155/140 antibody, EJ antibody, KU antibody, U2 RNP antibody, SRP antibody, OJ antibody, SAE1 antibody, MDA5 antibody, NXP2 antibody, TIF 1 Gamma antibody, fibrillarin antibody, and PM/scl1 100 antibody.      Presentation can also be seen and paraneoplastic syndromes.  In review of his MRI of brain as his significant abnormality in the left maxillary sinus.  I am going to advise an ENT evaluation of this to be sure this is a benign condition and not a potential underlying neoplasm.    Finally, he will have an EMG to further assess for signs of myopathy, peripheral neuropathy or focal neuropathy.    He will follow up in clinic after his EMG.    Important to note, also  has a past medical history of Allergy, Asthma, Intestinal atresia, Pancreas divisum, and Short bowel syndrome.        Neisha Lee D.O, ABPN, AOBNP, ABEM    This note was created with voice recognition software.  Grammatical, syntax and spelling errors may be inevitable.

## 2018-09-18 NOTE — LETTER
September 18, 2018      Ángel Cobb Jr., MD  [address] [facility] [address] [place] [address]           [facility]  [address] [place] [address]  Phone: [phone]  Fax: [phone]          Patient: Casper Fenton   MR Number: [id number]   YOB: 1980   Date of Visit: 9/18/2018       Dear Dr. Ángel Cobb Jr.:    Thank you for referring Casper Fenton to me for evaluation. Attached you will find relevant portions of my assessment and plan of care.    If you have questions, please do not hesitate to call me. I look forward to following Casper Fenton along with you.    Sincerely,    Neisha Lee, DO    Enclosure  CC:  No Recipients    If you would like to receive this communication electronically, please contact [email or web address] or [phone] to request more information on [facility] access.    For providers and/or their staff who would like to refer a patient to [facility], please contact us through our one-stop-shop provider referral line, [facility] , at [phone].    If you feel you have received this communication in error or would no longer like to receive these types of communications, please e-mail [email or web address]

## 2018-09-18 NOTE — PATIENT INSTRUCTIONS
Will plan to get labs and an EMG to start.    Will get ENT to look at the abnormal sinus seen in MRI.    Will follow up in clinic after the EMG.

## 2018-09-19 ENCOUNTER — OFFICE VISIT (OUTPATIENT)
Dept: SURGICAL ONCOLOGY | Facility: CLINIC | Age: 38
End: 2018-09-19
Payer: MEDICAID

## 2018-09-19 VITALS
DIASTOLIC BLOOD PRESSURE: 65 MMHG | SYSTOLIC BLOOD PRESSURE: 102 MMHG | HEIGHT: 67 IN | WEIGHT: 121.94 LBS | HEART RATE: 69 BPM | TEMPERATURE: 99 F | BODY MASS INDEX: 19.14 KG/M2

## 2018-09-19 DIAGNOSIS — R62.7 FAILURE TO THRIVE IN ADULT: ICD-10-CM

## 2018-09-19 DIAGNOSIS — K91.89: Primary | ICD-10-CM

## 2018-09-19 PROCEDURE — 99214 OFFICE O/P EST MOD 30 MIN: CPT | Mod: S$PBB,,, | Performed by: SURGERY

## 2018-09-19 PROCEDURE — 99213 OFFICE O/P EST LOW 20 MIN: CPT | Mod: PBBFAC,PN | Performed by: SURGERY

## 2018-09-19 PROCEDURE — 99999 PR PBB SHADOW E&M-EST. PATIENT-LVL III: CPT | Mod: PBBFAC,,, | Performed by: SURGERY

## 2018-09-19 NOTE — LETTER
September 19, 2018        León Malhotra Jr., MD  [address] [facility] [place] [address]             [facility]  [facility], [address]  [facility] [address]  Phone: [phone]  Fax: [phone]   Patient: Casper Fenton   MR Number: [id number]   YOB: 1980   Date of Visit: 9/19/2018       Dear Dr. Malhotra:    Thank you for referring Casper Fenton to me for evaluation. Attached you will find relevant portions of my assessment and plan of care.    If you have questions, please do not hesitate to call me. I look forward to following Casper Fenton along with you.    Sincerely,      Yovany Terry MD            CC  No Recipients    Enclosure

## 2018-09-19 NOTE — PROGRESS NOTES
CT personally reviewed and confirms massive dilation of D2-4.  Labs reviewed: albumin 3.5, pre-albumin 14, INR 1.1, Vit A .22 (decreased), CBC ok.    Long talk with Casper:  ---I believe the best plan will be to resect the massively dilated D2-4 and revise the D-J. I expect this will be a moderately difficult case due to adhesions and the marked pathologic anatomy. The surgical alternative would be to move the D-J more distally on the dilated duodenum without resecting it. Although this would probably be a simpler procedure, I'm not sure that the massively dilated duodenum with ever return to normal and empty and not serve as a focus of bacterial overgrowth.   ---Per his labs and clinical picture, he is moderately malnourished but I don't think he needs/would benefit from preop TPN. Will plan to place a Jtube at surgery.     Questions answered. He understands and wishes to proceed.

## 2018-09-20 ENCOUNTER — OFFICE VISIT (OUTPATIENT)
Dept: OTOLARYNGOLOGY | Facility: CLINIC | Age: 38
End: 2018-09-20
Payer: MEDICAID

## 2018-09-20 VITALS
DIASTOLIC BLOOD PRESSURE: 74 MMHG | BODY MASS INDEX: 19 KG/M2 | SYSTOLIC BLOOD PRESSURE: 107 MMHG | WEIGHT: 121.06 LBS | HEIGHT: 67 IN

## 2018-09-20 DIAGNOSIS — J32.0 CHRONIC MAXILLARY SINUSITIS: Primary | ICD-10-CM

## 2018-09-20 PROCEDURE — 99999 PR PBB SHADOW E&M-EST. PATIENT-LVL II: CPT | Mod: PBBFAC,,, | Performed by: OTOLARYNGOLOGY

## 2018-09-20 PROCEDURE — 31231 NASAL ENDOSCOPY DX: CPT | Mod: S$PBB,,, | Performed by: OTOLARYNGOLOGY

## 2018-09-20 PROCEDURE — 99212 OFFICE O/P EST SF 10 MIN: CPT | Mod: PBBFAC,PO | Performed by: OTOLARYNGOLOGY

## 2018-09-20 PROCEDURE — 31231 NASAL ENDOSCOPY DX: CPT | Mod: PBBFAC,PO | Performed by: OTOLARYNGOLOGY

## 2018-09-20 PROCEDURE — 99203 OFFICE O/P NEW LOW 30 MIN: CPT | Mod: 25,S$PBB,, | Performed by: OTOLARYNGOLOGY

## 2018-09-20 NOTE — LETTER
September 20, 2018      Neisha Lee, DO  [address]  [facility] [address]           [facility]  [address]  [facility] [address]  Phone: [phone]  Fax: [phone]          Patient: Casper Fenton   MR Number: [id number]   YOB: 1980   Date of Visit: 9/20/2018       Dear Dr. Neisha Lee:    Thank you for referring Casper Fenton to me for evaluation. Attached you will find relevant portions of my assessment and plan of care.    If you have questions, please do not hesitate to call me. I look forward to following Casper Fenton along with you.    Sincerely,    Mahad Rivers MD    Enclosure  CC:  No Recipients    If you would like to receive this communication electronically, please contact [email or web address] or [phone] to request more information on [facility] access.    For providers and/or their staff who would like to refer a patient to [facility], please contact us through our one-stop-shop provider referral line, [facility] , at [phone].    If you feel you have received this communication in error or would no longer like to receive these types of communications, please e-mail [email or web address]

## 2018-09-20 NOTE — PROGRESS NOTES
Subjective:       Patient ID: Casper Fenton is a 37 y.o. male.    Chief Complaint: Other (CT scan showed fluid in sinus cavity per patient)    Casper is here for sinus/nasal complaints. Symptoms have been present for.   Noted left maxillary and ethmoid sinus thickening on MRI. This was noted on workup for progressive weakness possibly related to muscular disorder vs. Neurologic issue. He is currently having workup for this.   Baseline is pretty good.  Post-nasal drainage: yes, mild, intermittent  Pressure: no  Congestion: yes, intermittent, mild  Decreased sense of smell: no  Therapies tried: not regularly, OTC AH occasionally  Seasonal variation: yes    Allergy testing: no  History of asthma: not currently. Had childhood asthma   Anticoagulation: no   Pertinent meds: noncontributory  Pertinent surgical history: no  Pertinent medical issues: as above    Social History     Tobacco Use   Smoking Status Former Smoker    Packs/day: 1.00    Years: 5.00    Pack years: 5.00    Types: Cigarettes   Smokeless Tobacco Current User    Types: Chew   Tobacco Comment    1 can chew tobacco daily     Social History     Substance and Sexual Activity   Alcohol Use No    Alcohol/week: 0.0 oz        Review of Systems   Constitutional: Negative for activity change and appetite change.   Eyes: Negative for discharge.   Respiratory: Negative for difficulty breathing and wheezing   Cardiovascular: Negative for chest pain.   Gastrointestinal: Negative for abdominal distention and abdominal pain.   Endocrine: Negative for cold intolerance and heat intolerance.   Genitourinary: Negative for dysuria.   Musculoskeletal: Negative for gait problem and joint swelling.   Skin: Negative for color change and pallor.   Neurological: Negative for syncope and weakness.   Psychiatric/Behavioral: Negative for agitation and confusion.     Objective:        Constitutional:   He is oriented to person, place, and time. He appears well-developed and  well-nourished. He appears alert. He is active. Normal speech.      Head:  Normocephalic and atraumatic. Head is without TMJ tenderness. No scars. Salivary glands normal.  Facial strength is normal.      Ears:    Right Ear: No drainage or swelling. No middle ear effusion.   Left Ear: No drainage or swelling.  No middle ear effusion.     Nose:  Septal deviation (mod right) present. No mucosal edema, rhinorrhea or sinus tenderness. No turbinate hypertrophy.      Mouth/Throat  Oropharynx clear and moist without lesions or asymmetry, normal uvula midline and mirror exam normal. Normal dentition. No uvula swelling, lacerations or trismus. No oropharyngeal exudate. Tonsillar erythema, tonsillar exudate.      Neck:  Full range of motion with neck supple and no adenopathy. Thyroid tenderness is present. No tracheal deviation, no edema, no erythema, normal range of motion, no stridor, no crepitus and no neck rigidity present. No thyroid mass present.     Cardiovascular:   Intact distal pulses and normal pulses.      Pulmonary/Chest:   Effort normal and breath sounds normal. No stridor.     Psychiatric:   His speech is normal and behavior is normal. His mood appears not anxious. His affect is not labile.     Neurological:   He is alert and oriented to person, place, and time. No sensory deficit.     Skin:   No abrasions, lacerations, lesions, or rashes. No abrasion and no bruising noted.         Tests / Results:    I personally reviewed the MRI brain and my findings reveal:   Moderate/severe mucosal thickening / fluid in left maxillary sinus with no obstruction or extension into nasal cavity. Scant left ant ethmoid thickening    Name: Casper Fenton     Pre-procedure diagnosis: Chronic maxillary sinusitis [J32.0]  Post-procedure diagnosis: Same    Procedure: Bilateral nasal endoscopy  Anesthesia:  2% Lidocaine and 4% Oxymetazoline topical    Procedure: Risks, benefits, and alternatives of the procedure were discussed with  the patient, and consent was obtained to perform a nasal endoscopy.  The nasal cavity was sprayed with a topical decongestant and topical anesthetic. After adequate anesthesia was obtained, the scope was passed into each nostril independently.  The nasal cavities, nasopharynx, choana, eustachian tube, fossa of Rosenmüller, and adenoids were examined with the findings described below. At the end of the examination, the scope was removed. The patient tolerated the procedure well with no complications.     Findings:  -     Nasal septum: RIGHT moderate deviation   -     Inferior turbinate: - normal mucosa without significant hypertrophy - bilaterally  -     Middle meatus: LEFT: no purulence, no obstruction, no polyps, patent accessory os with normal appearing maxillary mucosa through window RIGHT: no purulence, no obstruction, no polyps  -     Sphenoethmoidal recess LEFT: no purulence, no obstruction, no polyps RIGHT no purulence, no obstruction, no polyps  -     Adenoids have no hypertrophy  -     There is no stenosis of the choana,  eustachian tube, or nasopharynx  -     Fossa of Rosenmüller is symmetric and contains no mass  -     No other findings      Assessment:       1. Chronic maxillary sinusitis          Plan:         Reassured - his symptoms are minimal and scope and imaging consistent with chronic maxillary thickening vs. Mucous retention cyst. No focal symptoms to suggest superinfection. He will monitor symptoms and let me know if he worsens. Otherwise he will follow-up as needed and he is cleared for any additional procedures without any concern for infection from my perspective.

## 2018-09-28 PROBLEM — R50.9 FEVER: Status: ACTIVE | Noted: 2018-09-28

## 2018-10-01 ENCOUNTER — TELEPHONE (OUTPATIENT)
Dept: SURGERY | Facility: CLINIC | Age: 38
End: 2018-10-01

## 2018-10-01 NOTE — TELEPHONE ENCOUNTER
----- Message from Chaya Jones sent at 10/1/2018  8:02 AM CDT -----  Contact: Pt.'s Wife  [phone]  Caller states she would like to speak with nurse  in reference to her  admitted back into the hospital please call Pt.'s Wife  back @ [phone]   Thank You

## 2018-10-02 PROBLEM — R50.9 FEVER: Status: RESOLVED | Noted: 2018-09-28 | Resolved: 2018-10-02

## 2018-10-03 ENCOUNTER — TREATMENT PLANNING (OUTPATIENT)
Dept: SURGERY | Facility: CLINIC | Age: 38
End: 2018-10-03

## 2018-10-03 ENCOUNTER — APPOINTMENT (OUTPATIENT)
Dept: LAB | Facility: HOSPITAL | Age: 38
End: 2018-10-03
Attending: SURGERY
Payer: MEDICAID

## 2018-10-03 DIAGNOSIS — K91.89: Primary | ICD-10-CM

## 2018-10-03 DIAGNOSIS — I48.91 ATRIAL FIBRILLATION WITH RVR: Primary | ICD-10-CM

## 2018-10-03 NOTE — PROGRESS NOTES
Mr Fenton was admitted to [facility] late last week with low grade fever and chest pain. Apparently, an acute coronary syndrome was ruled out, but he went into afib/flutter with RVR. Echo showed good EF with LA dilation. He was rate controlled on lopressor and sent home on a baby aspirin, but remains in afib at home.   Phone call and spoke to Mrs Fenton. I am not comfortable proceeding with surgery Friday without more input from Cardiology and will arrange for further evaluation before we proceed. Se understands.

## 2018-10-04 ENCOUNTER — TELEPHONE (OUTPATIENT)
Dept: SURGERY | Facility: CLINIC | Age: 38
End: 2018-10-04

## 2018-10-04 DIAGNOSIS — K91.89: Primary | ICD-10-CM

## 2018-10-04 NOTE — TELEPHONE ENCOUNTER
----- Message from Vale Corrigan sent at 10/4/2018  8:46 AM CDT -----  Contact: Brittaney/ wife  Caller is looking for a call regarding her husbands Admission into the hospital, please call the pt wife at [phone]

## 2018-10-05 LAB — ARUP MISCELLANEOUS TEST 1: NORMAL

## 2018-10-10 ENCOUNTER — LAB VISIT (OUTPATIENT)
Dept: LAB | Facility: HOSPITAL | Age: 38
End: 2018-10-10
Attending: SURGERY
Payer: MEDICAID

## 2018-10-10 DIAGNOSIS — K91.89: ICD-10-CM

## 2018-10-10 LAB
ANION GAP SERPL CALC-SCNC: 13 MMOL/L
BUN SERPL-MCNC: 6 MG/DL
CALCIUM SERPL-MCNC: 9.1 MG/DL
CHLORIDE SERPL-SCNC: 106 MMOL/L
CO2 SERPL-SCNC: 21 MMOL/L
CREAT SERPL-MCNC: 0.6 MG/DL
EST. GFR  (AFRICAN AMERICAN): >60 ML/MIN/1.73 M^2
EST. GFR  (NON AFRICAN AMERICAN): >60 ML/MIN/1.73 M^2
GLUCOSE SERPL-MCNC: 97 MG/DL
POTASSIUM SERPL-SCNC: 4.5 MMOL/L
SODIUM SERPL-SCNC: 140 MMOL/L

## 2018-10-10 PROCEDURE — 36415 COLL VENOUS BLD VENIPUNCTURE: CPT | Mod: PO

## 2018-10-10 PROCEDURE — 80048 BASIC METABOLIC PNL TOTAL CA: CPT

## 2018-10-11 ENCOUNTER — TELEPHONE (OUTPATIENT)
Dept: SURGERY | Facility: CLINIC | Age: 38
End: 2018-10-11

## 2018-10-17 ENCOUNTER — LAB VISIT (OUTPATIENT)
Dept: LAB | Facility: HOSPITAL | Age: 38
End: 2018-10-17
Attending: SURGERY
Payer: MEDICAID

## 2018-10-17 ENCOUNTER — TELEPHONE (OUTPATIENT)
Dept: SURGERY | Facility: CLINIC | Age: 38
End: 2018-10-17

## 2018-10-17 DIAGNOSIS — K91.89: ICD-10-CM

## 2018-10-17 LAB
ANION GAP SERPL CALC-SCNC: 8 MMOL/L
BUN SERPL-MCNC: 12 MG/DL
CALCIUM SERPL-MCNC: 9.4 MG/DL
CHLORIDE SERPL-SCNC: 105 MMOL/L
CO2 SERPL-SCNC: 29 MMOL/L
CREAT SERPL-MCNC: 0.7 MG/DL
EST. GFR  (AFRICAN AMERICAN): >60 ML/MIN/1.73 M^2
EST. GFR  (NON AFRICAN AMERICAN): >60 ML/MIN/1.73 M^2
GLUCOSE SERPL-MCNC: 91 MG/DL
POTASSIUM SERPL-SCNC: 4.7 MMOL/L
SODIUM SERPL-SCNC: 142 MMOL/L

## 2018-10-17 PROCEDURE — 80048 BASIC METABOLIC PNL TOTAL CA: CPT

## 2018-10-17 PROCEDURE — 36415 COLL VENOUS BLD VENIPUNCTURE: CPT | Mod: PO

## 2018-10-17 NOTE — TELEPHONE ENCOUNTER
----- Message from Chaya Jones sent at 10/17/2018  2:12 PM CDT -----  Contact: Pt.'s Wife  [phone]   Caller states she would like to speak with nurse in reference to preparation for surgery please call Pt.'s Wife  back @ [phone] Thank You

## 2018-10-18 ENCOUNTER — ANESTHESIA EVENT (OUTPATIENT)
Dept: SURGERY | Facility: HOSPITAL | Age: 38
DRG: 327 | End: 2018-10-18
Payer: MEDICAID

## 2018-10-18 ENCOUNTER — TELEPHONE (OUTPATIENT)
Dept: SURGERY | Facility: CLINIC | Age: 38
End: 2018-10-18

## 2018-10-18 DIAGNOSIS — K91.89: Primary | ICD-10-CM

## 2018-10-18 RX ORDER — LIDOCAINE HYDROCHLORIDE 10 MG/ML
1 INJECTION, SOLUTION EPIDURAL; INFILTRATION; INTRACAUDAL; PERINEURAL ONCE
Status: CANCELLED | OUTPATIENT
Start: 2018-10-18 | End: 2018-10-18

## 2018-10-18 RX ORDER — SODIUM CHLORIDE 9 MG/ML
INJECTION, SOLUTION INTRAVENOUS CONTINUOUS
Status: CANCELLED | OUTPATIENT
Start: 2018-10-18

## 2018-10-18 RX ORDER — ENOXAPARIN SODIUM 100 MG/ML
40 INJECTION SUBCUTANEOUS ONCE
Status: CANCELLED | OUTPATIENT
Start: 2018-10-18

## 2018-10-19 ENCOUNTER — HOSPITAL ENCOUNTER (INPATIENT)
Facility: HOSPITAL | Age: 38
LOS: 8 days | Discharge: HOME | DRG: 327 | End: 2018-10-27
Attending: SURGERY | Admitting: SURGERY
Payer: MEDICAID

## 2018-10-19 ENCOUNTER — ANESTHESIA (OUTPATIENT)
Dept: SURGERY | Facility: HOSPITAL | Age: 38
DRG: 327 | End: 2018-10-19
Payer: MEDICAID

## 2018-10-19 DIAGNOSIS — R62.7 FAILURE TO THRIVE IN ADULT: ICD-10-CM

## 2018-10-19 DIAGNOSIS — K91.89: Primary | ICD-10-CM

## 2018-10-19 PROBLEM — R53.1 GENERALIZED WEAKNESS: Status: RESOLVED | Noted: 2018-08-26 | Resolved: 2018-10-19

## 2018-10-19 PROBLEM — I48.19 PERSISTENT ATRIAL FIBRILLATION: Status: ACTIVE | Noted: 2018-10-19

## 2018-10-19 PROBLEM — R74.8 ELEVATED CK: Status: RESOLVED | Noted: 2018-08-26 | Resolved: 2018-10-19

## 2018-10-19 LAB
ABO + RH BLD: NORMAL
BLD GP AB SCN CELLS X3 SERPL QL: NORMAL

## 2018-10-19 PROCEDURE — 25000003 PHARM REV CODE 250: Performed by: SURGERY

## 2018-10-19 PROCEDURE — 63600175 PHARM REV CODE 636 W HCPCS

## 2018-10-19 PROCEDURE — 86850 RBC ANTIBODY SCREEN: CPT

## 2018-10-19 PROCEDURE — C9113 INJ PANTOPRAZOLE SODIUM, VIA: HCPCS | Performed by: SURGERY

## 2018-10-19 PROCEDURE — 63600175 PHARM REV CODE 636 W HCPCS: Performed by: STUDENT IN AN ORGANIZED HEALTH CARE EDUCATION/TRAINING PROGRAM

## 2018-10-19 PROCEDURE — 86920 COMPATIBILITY TEST SPIN: CPT

## 2018-10-19 PROCEDURE — 0DNW0ZZ RELEASE PERITONEUM, OPEN APPROACH: ICD-10-PCS | Performed by: SURGERY

## 2018-10-19 PROCEDURE — 20600001 HC STEP DOWN PRIVATE ROOM

## 2018-10-19 PROCEDURE — 88304 TISSUE EXAM BY PATHOLOGIST: CPT | Mod: 26,,, | Performed by: PATHOLOGY

## 2018-10-19 PROCEDURE — 36000708 HC OR TIME LEV III 1ST 15 MIN: Performed by: SURGERY

## 2018-10-19 PROCEDURE — 25000003 PHARM REV CODE 250: Performed by: STUDENT IN AN ORGANIZED HEALTH CARE EDUCATION/TRAINING PROGRAM

## 2018-10-19 PROCEDURE — C1757 CATH, THROMBECTOMY/EMBOLECT: HCPCS | Performed by: SURGERY

## 2018-10-19 PROCEDURE — 88307 TISSUE EXAM BY PATHOLOGIST: CPT | Mod: 26,,, | Performed by: PATHOLOGY

## 2018-10-19 PROCEDURE — 37000009 HC ANESTHESIA EA ADD 15 MINS: Performed by: SURGERY

## 2018-10-19 PROCEDURE — 71000033 HC RECOVERY, INTIAL HOUR: Performed by: SURGERY

## 2018-10-19 PROCEDURE — 0FT40ZZ RESECTION OF GALLBLADDER, OPEN APPROACH: ICD-10-PCS | Performed by: SURGERY

## 2018-10-19 PROCEDURE — 37000008 HC ANESTHESIA 1ST 15 MINUTES: Performed by: SURGERY

## 2018-10-19 PROCEDURE — D9220A PRA ANESTHESIA: Mod: ,,, | Performed by: ANESTHESIOLOGY

## 2018-10-19 PROCEDURE — 88307 TISSUE EXAM BY PATHOLOGIST: CPT | Performed by: PATHOLOGY

## 2018-10-19 PROCEDURE — 27800903 OPTIME MED/SURG SUP & DEVICES OTHER IMPLANTS: Performed by: SURGERY

## 2018-10-19 PROCEDURE — C9290 INJ, BUPIVACAINE LIPOSOME: HCPCS | Performed by: SURGERY

## 2018-10-19 PROCEDURE — 0D160Z9 BYPASS STOMACH TO DUODENUM, OPEN APPROACH: ICD-10-PCS | Performed by: SURGERY

## 2018-10-19 PROCEDURE — 36000709 HC OR TIME LEV III EA ADD 15 MIN: Performed by: SURGERY

## 2018-10-19 PROCEDURE — 71000039 HC RECOVERY, EACH ADD'L HOUR: Performed by: SURGERY

## 2018-10-19 PROCEDURE — S0020 INJECTION, BUPIVICAINE HYDRO: HCPCS | Performed by: SURGERY

## 2018-10-19 PROCEDURE — 63600175 PHARM REV CODE 636 W HCPCS: Performed by: SURGERY

## 2018-10-19 PROCEDURE — 94761 N-INVAS EAR/PLS OXIMETRY MLT: CPT

## 2018-10-19 PROCEDURE — 27201423 OPTIME MED/SURG SUP & DEVICES STERILE SUPPLY: Performed by: SURGERY

## 2018-10-19 PROCEDURE — 27000221 HC OXYGEN, UP TO 24 HOURS

## 2018-10-19 PROCEDURE — 0DB90ZZ EXCISION OF DUODENUM, OPEN APPROACH: ICD-10-PCS | Performed by: SURGERY

## 2018-10-19 RX ORDER — NEOSTIGMINE METHYLSULFATE 1 MG/ML
INJECTION, SOLUTION INTRAVENOUS
Status: DISCONTINUED | OUTPATIENT
Start: 2018-10-19 | End: 2018-10-19

## 2018-10-19 RX ORDER — SODIUM CHLORIDE 0.9 % (FLUSH) 0.9 %
3 SYRINGE (ML) INJECTION
Status: DISCONTINUED | OUTPATIENT
Start: 2018-10-19 | End: 2018-10-19

## 2018-10-19 RX ORDER — CEFOXITIN 1 G/1
2 INJECTION, POWDER, FOR SOLUTION INTRAVENOUS
Status: COMPLETED | OUTPATIENT
Start: 2018-10-19 | End: 2018-10-19

## 2018-10-19 RX ORDER — METOPROLOL TARTRATE 1 MG/ML
5 INJECTION, SOLUTION INTRAVENOUS EVERY 6 HOURS
Status: DISCONTINUED | OUTPATIENT
Start: 2018-10-19 | End: 2018-10-22

## 2018-10-19 RX ORDER — HYDROMORPHONE HCL IN 0.9% NACL 6 MG/30 ML
PATIENT CONTROLLED ANALGESIA SYRINGE INTRAVENOUS CONTINUOUS
Status: DISCONTINUED | OUTPATIENT
Start: 2018-10-19 | End: 2018-10-22

## 2018-10-19 RX ORDER — PROPOFOL 10 MG/ML
VIAL (ML) INTRAVENOUS
Status: DISCONTINUED | OUTPATIENT
Start: 2018-10-19 | End: 2018-10-19

## 2018-10-19 RX ORDER — GLYCOPYRROLATE 0.2 MG/ML
INJECTION INTRAMUSCULAR; INTRAVENOUS
Status: DISCONTINUED | OUTPATIENT
Start: 2018-10-19 | End: 2018-10-19

## 2018-10-19 RX ORDER — ACETAMINOPHEN 10 MG/ML
INJECTION, SOLUTION INTRAVENOUS
Status: DISCONTINUED | OUTPATIENT
Start: 2018-10-19 | End: 2018-10-19

## 2018-10-19 RX ORDER — SODIUM CHLORIDE 9 MG/ML
INJECTION, SOLUTION INTRAVENOUS CONTINUOUS
Status: DISCONTINUED | OUTPATIENT
Start: 2018-10-19 | End: 2018-10-20

## 2018-10-19 RX ORDER — BUPIVACAINE HYDROCHLORIDE 5 MG/ML
INJECTION, SOLUTION EPIDURAL; INTRACAUDAL
Status: DISCONTINUED | OUTPATIENT
Start: 2018-10-19 | End: 2018-10-19 | Stop reason: HOSPADM

## 2018-10-19 RX ORDER — MIDAZOLAM HYDROCHLORIDE 1 MG/ML
INJECTION, SOLUTION INTRAMUSCULAR; INTRAVENOUS
Status: DISCONTINUED | OUTPATIENT
Start: 2018-10-19 | End: 2018-10-19

## 2018-10-19 RX ORDER — LIDOCAINE HYDROCHLORIDE 10 MG/ML
1 INJECTION, SOLUTION EPIDURAL; INFILTRATION; INTRACAUDAL; PERINEURAL ONCE
Status: COMPLETED | OUTPATIENT
Start: 2018-10-19 | End: 2018-10-19

## 2018-10-19 RX ORDER — ROCURONIUM BROMIDE 10 MG/ML
INJECTION, SOLUTION INTRAVENOUS
Status: DISCONTINUED | OUTPATIENT
Start: 2018-10-19 | End: 2018-10-19

## 2018-10-19 RX ORDER — SODIUM CHLORIDE 9 MG/ML
INJECTION, SOLUTION INTRAVENOUS CONTINUOUS
Status: DISCONTINUED | OUTPATIENT
Start: 2018-10-19 | End: 2018-10-19

## 2018-10-19 RX ORDER — ONDANSETRON 2 MG/ML
INJECTION INTRAMUSCULAR; INTRAVENOUS
Status: DISCONTINUED | OUTPATIENT
Start: 2018-10-19 | End: 2018-10-19

## 2018-10-19 RX ORDER — ENOXAPARIN SODIUM 100 MG/ML
40 INJECTION SUBCUTANEOUS EVERY 24 HOURS
Status: DISCONTINUED | OUTPATIENT
Start: 2018-10-20 | End: 2018-10-27 | Stop reason: HOSPADM

## 2018-10-19 RX ORDER — FENTANYL CITRATE 50 UG/ML
25 INJECTION, SOLUTION INTRAMUSCULAR; INTRAVENOUS EVERY 5 MIN PRN
Status: DISCONTINUED | OUTPATIENT
Start: 2018-10-19 | End: 2018-10-19

## 2018-10-19 RX ORDER — KETAMINE HYDROCHLORIDE 100 MG/ML
INJECTION, SOLUTION INTRAMUSCULAR; INTRAVENOUS
Status: DISCONTINUED | OUTPATIENT
Start: 2018-10-19 | End: 2018-10-19

## 2018-10-19 RX ORDER — ENOXAPARIN SODIUM 100 MG/ML
40 INJECTION SUBCUTANEOUS ONCE
Status: COMPLETED | OUTPATIENT
Start: 2018-10-19 | End: 2018-10-19

## 2018-10-19 RX ORDER — PHENYLEPHRINE HYDROCHLORIDE 10 MG/ML
INJECTION INTRAVENOUS
Status: DISCONTINUED | OUTPATIENT
Start: 2018-10-19 | End: 2018-10-19

## 2018-10-19 RX ORDER — SODIUM CHLORIDE, SODIUM LACTATE, POTASSIUM CHLORIDE, CALCIUM CHLORIDE 600; 310; 30; 20 MG/100ML; MG/100ML; MG/100ML; MG/100ML
INJECTION, SOLUTION INTRAVENOUS CONTINUOUS
Status: DISCONTINUED | OUTPATIENT
Start: 2018-10-19 | End: 2018-10-22

## 2018-10-19 RX ORDER — NALOXONE HCL 0.4 MG/ML
0.02 VIAL (ML) INJECTION
Status: DISCONTINUED | OUTPATIENT
Start: 2018-10-19 | End: 2018-10-22

## 2018-10-19 RX ORDER — ESMOLOL HYDROCHLORIDE 10 MG/ML
INJECTION INTRAVENOUS
Status: DISCONTINUED | OUTPATIENT
Start: 2018-10-19 | End: 2018-10-19

## 2018-10-19 RX ORDER — HYDROMORPHONE HCL IN 0.9% NACL 6 MG/30 ML
PATIENT CONTROLLED ANALGESIA SYRINGE INTRAVENOUS
Status: COMPLETED
Start: 2018-10-19 | End: 2018-10-19

## 2018-10-19 RX ORDER — ONDANSETRON 2 MG/ML
4 INJECTION INTRAMUSCULAR; INTRAVENOUS DAILY PRN
Status: DISCONTINUED | OUTPATIENT
Start: 2018-10-19 | End: 2018-10-19

## 2018-10-19 RX ORDER — FENTANYL CITRATE 50 UG/ML
INJECTION, SOLUTION INTRAMUSCULAR; INTRAVENOUS
Status: DISCONTINUED | OUTPATIENT
Start: 2018-10-19 | End: 2018-10-19

## 2018-10-19 RX ORDER — DEXAMETHASONE SODIUM PHOSPHATE 4 MG/ML
INJECTION, SOLUTION INTRA-ARTICULAR; INTRALESIONAL; INTRAMUSCULAR; INTRAVENOUS; SOFT TISSUE
Status: DISCONTINUED | OUTPATIENT
Start: 2018-10-19 | End: 2018-10-19

## 2018-10-19 RX ORDER — PANTOPRAZOLE SODIUM 40 MG/10ML
40 INJECTION, POWDER, LYOPHILIZED, FOR SOLUTION INTRAVENOUS DAILY
Status: DISCONTINUED | OUTPATIENT
Start: 2018-10-19 | End: 2018-10-25

## 2018-10-19 RX ORDER — ONDANSETRON 2 MG/ML
4 INJECTION INTRAMUSCULAR; INTRAVENOUS EVERY 6 HOURS PRN
Status: DISCONTINUED | OUTPATIENT
Start: 2018-10-19 | End: 2018-10-27 | Stop reason: HOSPADM

## 2018-10-19 RX ORDER — LIDOCAINE HCL/PF 100 MG/5ML
SYRINGE (ML) INTRAVENOUS
Status: DISCONTINUED | OUTPATIENT
Start: 2018-10-19 | End: 2018-10-19

## 2018-10-19 RX ORDER — ONDANSETRON 2 MG/ML
4 INJECTION INTRAMUSCULAR; INTRAVENOUS DAILY PRN
Status: DISCONTINUED | OUTPATIENT
Start: 2018-10-19 | End: 2018-10-19 | Stop reason: HOSPADM

## 2018-10-19 RX ADMIN — ROCURONIUM BROMIDE 40 MG: 10 INJECTION, SOLUTION INTRAVENOUS at 07:10

## 2018-10-19 RX ADMIN — KETAMINE HYDROCHLORIDE 10 MG: 100 INJECTION, SOLUTION, CONCENTRATE INTRAMUSCULAR; INTRAVENOUS at 10:10

## 2018-10-19 RX ADMIN — PHENYLEPHRINE HYDROCHLORIDE 100 MCG: 10 INJECTION INTRAVENOUS at 10:10

## 2018-10-19 RX ADMIN — PHENYLEPHRINE HYDROCHLORIDE 100 MCG: 10 INJECTION INTRAVENOUS at 08:10

## 2018-10-19 RX ADMIN — KETAMINE HYDROCHLORIDE 20 MG: 100 INJECTION, SOLUTION, CONCENTRATE INTRAMUSCULAR; INTRAVENOUS at 08:10

## 2018-10-19 RX ADMIN — PHENYLEPHRINE HYDROCHLORIDE 200 MCG: 10 INJECTION INTRAVENOUS at 11:10

## 2018-10-19 RX ADMIN — SODIUM CHLORIDE: 0.9 INJECTION, SOLUTION INTRAVENOUS at 07:10

## 2018-10-19 RX ADMIN — ROCURONIUM BROMIDE 10 MG: 10 INJECTION, SOLUTION INTRAVENOUS at 10:10

## 2018-10-19 RX ADMIN — Medication: at 01:10

## 2018-10-19 RX ADMIN — ACETAMINOPHEN 1000 MG: 10 INJECTION, SOLUTION INTRAVENOUS at 11:10

## 2018-10-19 RX ADMIN — ESMOLOL HYDROCHLORIDE 30 MG: 10 INJECTION, SOLUTION INTRAVENOUS at 12:10

## 2018-10-19 RX ADMIN — PHENYLEPHRINE HYDROCHLORIDE 100 MCG: 10 INJECTION INTRAVENOUS at 11:10

## 2018-10-19 RX ADMIN — EPHEDRINE SULFATE 10 MG: 50 INJECTION INTRAMUSCULAR; INTRAVENOUS; SUBCUTANEOUS at 01:10

## 2018-10-19 RX ADMIN — KETAMINE HYDROCHLORIDE 10 MG: 100 INJECTION, SOLUTION, CONCENTRATE INTRAMUSCULAR; INTRAVENOUS at 09:10

## 2018-10-19 RX ADMIN — KETAMINE HYDROCHLORIDE 10 MG: 100 INJECTION, SOLUTION, CONCENTRATE INTRAMUSCULAR; INTRAVENOUS at 11:10

## 2018-10-19 RX ADMIN — CEFOXITIN 2 G: 1 INJECTION, POWDER, FOR SOLUTION INTRAVENOUS at 08:10

## 2018-10-19 RX ADMIN — PHENYLEPHRINE HYDROCHLORIDE 200 MCG: 10 INJECTION INTRAVENOUS at 12:10

## 2018-10-19 RX ADMIN — PHENYLEPHRINE HYDROCHLORIDE 100 MCG: 10 INJECTION INTRAVENOUS at 12:10

## 2018-10-19 RX ADMIN — LIDOCAINE HYDROCHLORIDE 0.1 MG: 10 INJECTION, SOLUTION EPIDURAL; INFILTRATION; INTRACAUDAL at 05:10

## 2018-10-19 RX ADMIN — GLYCOPYRROLATE 0.6 MG: 0.2 INJECTION, SOLUTION INTRAMUSCULAR; INTRAVENOUS at 12:10

## 2018-10-19 RX ADMIN — ROCURONIUM BROMIDE 10 MG: 10 INJECTION, SOLUTION INTRAVENOUS at 11:10

## 2018-10-19 RX ADMIN — ONDANSETRON 4 MG: 2 INJECTION INTRAMUSCULAR; INTRAVENOUS at 11:10

## 2018-10-19 RX ADMIN — SODIUM CHLORIDE, SODIUM GLUCONATE, SODIUM ACETATE, POTASSIUM CHLORIDE, MAGNESIUM CHLORIDE, SODIUM PHOSPHATE, DIBASIC, AND POTASSIUM PHOSPHATE: .53; .5; .37; .037; .03; .012; .00082 INJECTION, SOLUTION INTRAVENOUS at 11:10

## 2018-10-19 RX ADMIN — FENTANYL CITRATE 100 MCG: 50 INJECTION, SOLUTION INTRAMUSCULAR; INTRAVENOUS at 07:10

## 2018-10-19 RX ADMIN — FENTANYL CITRATE 25 MCG: 50 INJECTION, SOLUTION INTRAMUSCULAR; INTRAVENOUS at 12:10

## 2018-10-19 RX ADMIN — CEFOXITIN 2 G: 1 INJECTION, POWDER, FOR SOLUTION INTRAVENOUS at 04:10

## 2018-10-19 RX ADMIN — SODIUM CHLORIDE, SODIUM GLUCONATE, SODIUM ACETATE, POTASSIUM CHLORIDE, MAGNESIUM CHLORIDE, SODIUM PHOSPHATE, DIBASIC, AND POTASSIUM PHOSPHATE: .53; .5; .37; .037; .03; .012; .00082 INJECTION, SOLUTION INTRAVENOUS at 08:10

## 2018-10-19 RX ADMIN — LIDOCAINE HYDROCHLORIDE 100 MG: 20 INJECTION, SOLUTION INTRAVENOUS at 07:10

## 2018-10-19 RX ADMIN — ROCURONIUM BROMIDE 10 MG: 10 INJECTION, SOLUTION INTRAVENOUS at 09:10

## 2018-10-19 RX ADMIN — EPHEDRINE SULFATE 10 MG: 50 INJECTION INTRAMUSCULAR; INTRAVENOUS; SUBCUTANEOUS at 12:10

## 2018-10-19 RX ADMIN — ENOXAPARIN SODIUM 40 MG: 40 INJECTION, SOLUTION INTRAVENOUS; SUBCUTANEOUS at 06:10

## 2018-10-19 RX ADMIN — NEOSTIGMINE METHYLSULFATE 5 MG: 1 INJECTION INTRAVENOUS at 12:10

## 2018-10-19 RX ADMIN — ROCURONIUM BROMIDE 20 MG: 10 INJECTION, SOLUTION INTRAVENOUS at 11:10

## 2018-10-19 RX ADMIN — EPHEDRINE SULFATE 5 MG: 50 INJECTION INTRAMUSCULAR; INTRAVENOUS; SUBCUTANEOUS at 12:10

## 2018-10-19 RX ADMIN — PHENYLEPHRINE HYDROCHLORIDE 100 MCG: 10 INJECTION INTRAVENOUS at 09:10

## 2018-10-19 RX ADMIN — SODIUM CHLORIDE, SODIUM LACTATE, POTASSIUM CHLORIDE, AND CALCIUM CHLORIDE 1000 ML: .6; .31; .03; .02 INJECTION, SOLUTION INTRAVENOUS at 01:10

## 2018-10-19 RX ADMIN — EPHEDRINE SULFATE 10 MG: 50 INJECTION INTRAMUSCULAR; INTRAVENOUS; SUBCUTANEOUS at 08:10

## 2018-10-19 RX ADMIN — DEXAMETHASONE SODIUM PHOSPHATE 4 MG: 4 INJECTION, SOLUTION INTRAMUSCULAR; INTRAVENOUS at 08:10

## 2018-10-19 RX ADMIN — SODIUM CHLORIDE, SODIUM GLUCONATE, SODIUM ACETATE, POTASSIUM CHLORIDE, MAGNESIUM CHLORIDE, SODIUM PHOSPHATE, DIBASIC, AND POTASSIUM PHOSPHATE: .53; .5; .37; .037; .03; .012; .00082 INJECTION, SOLUTION INTRAVENOUS at 10:10

## 2018-10-19 RX ADMIN — SODIUM CHLORIDE, SODIUM LACTATE, POTASSIUM CHLORIDE, AND CALCIUM CHLORIDE: 600; 310; 30; 20 INJECTION, SOLUTION INTRAVENOUS at 01:10

## 2018-10-19 RX ADMIN — MIDAZOLAM HYDROCHLORIDE 2 MG: 1 INJECTION, SOLUTION INTRAMUSCULAR; INTRAVENOUS at 07:10

## 2018-10-19 RX ADMIN — PANTOPRAZOLE SODIUM 40 MG: 40 INJECTION, POWDER, FOR SOLUTION INTRAVENOUS at 04:10

## 2018-10-19 RX ADMIN — FENTANYL CITRATE 25 MCG: 50 INJECTION, SOLUTION INTRAMUSCULAR; INTRAVENOUS at 11:10

## 2018-10-19 RX ADMIN — PHENYLEPHRINE HYDROCHLORIDE 100 MCG: 10 INJECTION INTRAVENOUS at 01:10

## 2018-10-19 RX ADMIN — ROCURONIUM BROMIDE 20 MG: 10 INJECTION, SOLUTION INTRAVENOUS at 09:10

## 2018-10-19 RX ADMIN — FENTANYL CITRATE 25 MCG: 50 INJECTION, SOLUTION INTRAMUSCULAR; INTRAVENOUS at 10:10

## 2018-10-19 RX ADMIN — PROPOFOL 180 MG: 10 INJECTION, EMULSION INTRAVENOUS at 07:10

## 2018-10-19 RX ADMIN — SODIUM CHLORIDE: 0.9 INJECTION, SOLUTION INTRAVENOUS at 05:10

## 2018-10-19 NOTE — H&P
History & Physical     SUBJECTIVE:   Interval history: Patient admitted to OSH with afib, now rate controlled but persistent afib. We sent him to our cardiologist who recommended metoprolol, no anticoagulation. No significant risk factors, negative stress test, proceed with surgery.     History of Present Illness:  Patient is a 37 y.o. male with h/o congenital intestinal atresia s/p bypass referred for failure to thrive and dilation of the bypassed duodenum. He has experienced persistent 40lb weight loss for the past 5 years. He has bloating and rapid transit diarrhea which is occasionally steatorrheic. Denies vomiting, nausea, regurgitation, or abdominal pain. He was adopted and they are unsure of his medical history. It would appear that he has had a duodeno-jejunal bypass of either a distal duodenal or proximal jejunal atresia. He is also having fatigue that has kept him from his job as a  from the past 2 years and neuropathy.      He has pancreatic division with mild dilation in his HPB tree; his bilirubin has come down in the past few weeks to normal. He denies h/o jaundice.          Chief Complaint   Patient presents with    Consult              Review of patient's allergies indicates:   Allergen Reactions    Kiwi (actinidia chinensis)           Current Medications   Current Outpatient Medications   Medication Sig Dispense Refill    B complx-C-folic-zinc-copper-E 500 mg-400 mcg- 24 mg-3 mg Tab Take 1 capsule by mouth every 12 (twelve) hours. 60 tablet 11    multivitamin-min-iron-FA-vit K 18 mg iron-400 mcg-25 mcg Tab Take 1 tablet by mouth once daily. 90 tablet 0    pantoprazole (PROTONIX) 40 MG tablet Take 1 tablet (40 mg total) by mouth once daily. 30 tablet 1    sucralfate (CARAFATE) 1 gram tablet Take 1 tablet (1 g total) by mouth 4 (four) times daily. 120 tablet 0    testosterone cypionate (DEPOTESTOTERONE CYPIONATE) 200 mg/mL injection Inject 200 mg into the muscle every 14 (fourteen) days.         thiamine 100 MG tablet Take 1 tablet (100 mg total) by mouth once daily.          No current facility-administered medications for this visit.                  Past Medical History:   Diagnosis Date    Allergy      Asthma      Pancreas divisum      Short bowel syndrome              Past Surgical History:   Procedure Laterality Date    BIOPSY LIVER AND ULTRASOUND N/A 3/1/2016     Performed by [facility] at [facility] OR 2ND FLR    DILATATION Bilateral 8/27/2018     Performed by León [facility]hotra Jr., [facility] at [facility]    EGD with push enteroscopy N/A 12/7/2015     Performed by [facility]ell [facility] at [facility] ENDO    ESOPHAGOGASTRODUODENOSCOPY N/A 8/27/2018     Procedure: ESOPHAGOGASTRODUODENOSCOPY (EGD);  Surgeon: León Malhotra Jr., MD;  Location: [facility];  Service: Endoscopy;  Laterality: N/A;    ESOPHAGOGASTRODUODENOSCOPY (EGD) N/A 8/27/2018     Performed by Le[facility]hotra Jr., [facility] at [facility]    SMALL INTESTINE SURGERY         1985    wisdom tooth removal                Family History   Problem Relation Age of Onset    Diabetes Mother      Stroke Mother      Heart disease Father      Stroke Maternal Grandmother        Social History            Tobacco Use    Smoking status: Former Smoker       Packs/day: 1.00       Years: 5.00       Pack years: 5.00       Types: Cigarettes    Smokeless tobacco: Current User    Tobacco comment: 1 can chew tobacco daily   Substance Use Topics    Alcohol use: No       Alcohol/week: 0.0 oz    Drug use: No         Review of Systems:  Review of Systems   Constitutional: Positive for activity change and appetite change. Negative for chills and fever.   HENT: Negative for congestion and trouble swallowing.    Eyes: Negative for visual disturbance.   Respiratory: Negative for cough and shortness of breath.    Cardiovascular: Negative for chest pain and leg swelling.   Gastrointestinal: Positive for diarrhea. Negative for abdominal distention, abdominal  pain, constipation, nausea and vomiting.   Endocrine: Negative for cold intolerance and heat intolerance.   Genitourinary: Negative for difficulty urinating and dysuria.   Musculoskeletal: Positive for gait problem. Negative for arthralgias and back pain.   Skin: Negative for rash and wound.   Neurological: Negative for dizziness and headaches.   Psychiatric/Behavioral: Negative for agitation and behavioral problems.         OBJECTIVE:      Vital Signs (Most Recent)  Temp: 97.4 °F (36.3 °C) (09/10/18 1349)  Pulse: 72 (09/10/18 1349)  Resp: 18 (09/10/18 1349)  BP: 113/75 (09/10/18 1349)  57.2 kg (126 lb 1.7 oz)      Physical Exam:  Physical Exam   Constitutional: He is oriented to person, place, and time. No distress.   Thin  Body mass index is 19.75 kg/m².     Cardiovascular: Normal rate and regular rhythm. Exam reveals no gallop and no friction rub.   No murmur heard.  Pulmonary/Chest: Effort normal and breath sounds normal. No respiratory distress. He has no wheezes. He has no rales.   Abdominal: Soft. Bowel sounds are normal. He exhibits no distension. There is no tenderness. There is no rebound.   Musculoskeletal: Normal range of motion. He exhibits no edema.   Neurological: He is alert and oriented to person, place, and time.   Skin: Skin is warm and dry.   Psychiatric: He has a normal mood and affect. His behavior is normal.         Laboratory  Reviewed     Diagnostic Results:  Reviewed  CT scan 2014: I do not appreciate a malrotation.     ASSESSMENT/PLAN:      36yo with h/o proximal intestinal atresia s/p suspected duodeno-jejunal bypass with persistent dilation of the bypassed duodenum and severe bile reflux/stasis. Failure to thrive.     PLAN:Plan      Update CT scan, labs  RTC to discuss possible operative intervention. Either he has a partially functioning bypass or an incomplete atresia (web) since material does move through.     History obtained, patient examined, extensive outside records and imaging  reviewed including barium UGI, MRI, old CT done in 2014, and recent EGD by Dr Malhotra.   He has a massively dilated D2-4 with a proximal DJ which, while anatomically open, does not appear to be emptying well from a functional standpoint. He has progressive, rather massive weight loss and multiple vitamin and mineral deficiencies suggesting malabsorption, either from blind loop/bacterial overgrowth or due to lack of duodenal absorption.     CT personally reviewed and confirms massive dilation of D2-4.  Labs reviewed: albumin 3.5, pre-albumin 14, INR 1.1, Vit A .22 (decreased), CBC ok.     Long talk with Casper:  ---I believe the best plan will be to resect the massively dilated D2-4 and revise the D-J. I expect this will be a moderately difficult case due to adhesions and the marked pathologic anatomy. The surgical alternative would be to move the D-J more distally on the dilated duodenum without resecting it. Although this would probably be a simpler procedure, I'm not sure that the massively dilated duodenum with ever return to normal and empty and not serve as a focus of bacterial overgrowth.   ---Per his labs and clinical picture, he is moderately malnourished but I don't think he needs/would benefit from preop TPN. Will plan to place a Jtube at surgery.

## 2018-10-19 NOTE — TRANSFER OF CARE
"Anesthesia Transfer of Care Note    Patient: Casper Fenton    Procedure(s) Performed: Procedure(s) (LRB):  EXCISION, duodenal resection (N/A)  REVISION, jejunostomy (N/A)  CHOLECYSTECTOMY  CREATION, JEJUNOSTOMY    Patient location: PACU    Anesthesia Type: general    Transport from OR: Transported from OR on 6-10 L/min O2 by face mask with adequate spontaneous ventilation    Post pain: adequate analgesia    Post assessment: no apparent anesthetic complications    Post vital signs: stable    Level of consciousness: sedated    Nausea/Vomiting: no nausea/vomiting    Complications: none    Transfer of care protocol was followed      Last vitals:   Visit Vitals  BP (!) 94/53   Pulse (!) 113   Temp 36.6 °C (97.9 °F) (Oral)   Resp 14   Ht 5' 9" (1.753 m)   Wt 58.1 kg (128 lb)   SpO2 98%   BMI 18.90 kg/m²     "

## 2018-10-19 NOTE — PROGRESS NOTES
Nursing Transfer Note      10/19/2018     Transfer To: 1047    Transfer via bed    Transfer with cardiac monitoring    Transported by excort    Medicines sent: IVF, PCA    Chart send with patient: Yes    Notified: spouse via surgical texting    Patient reassessed at: to be done by receiving RN (date, time)

## 2018-10-19 NOTE — BRIEF OP NOTE
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 Existing loop duodenojejunostomy to the second portion of the duodenum. Incidental meckel's near the ileocecal valve. 160cm of small bowel at the end of the case.     We mobilized his entire bowel. Lots of adhesions. Cholecystectomy. Mirella placed into the cystic duct to identify the ampulla. Distal duodenectomy up to this point. We also removed about 20cm of proximal jejunum up to the DJ. We elongated the existing DJ. The patient is now in continuity. A feeding jejunostomy was placed.     Description of the findings of the procedure: as above    Estimated Blood Loss: 200 mL         Specimens:   Specimen (12h ago, onward)    Start     Ordered    10/19/18 1149  Specimen to Pathology - Surgery  Once     Comments:  1. GALLBLADDER-PERM2. DUODENUM-PERM3. SEGMENT OF JEJUNUM-PERM     Start Status   10/19/18 1149 Collected (10/19/18 1204)       10/19/18 1143

## 2018-10-19 NOTE — NURSING
Pt arrived to unit via stretcher. Pt transferred to bed. Pt lying flat in bed. Pt connected to LIWS. No acute distress noted. Pt AAOx4 but drowsy. IV fluids infusing and PCA pump in use. Spouse and daughter at bedside. Will continue to monitor closely.

## 2018-10-19 NOTE — ANESTHESIA POSTPROCEDURE EVALUATION
"Anesthesia Post Evaluation    Patient: Casper Fenton    Procedure(s) Performed: Procedure(s) (LRB):  EXCISION, duodenal resection (N/A)  REVISION, jejunostomy (N/A)  CHOLECYSTECTOMY  CREATION, JEJUNOSTOMY    Final Anesthesia Type: general  Patient location during evaluation: PACU  Patient participation: Yes- Able to Participate  Level of consciousness: awake and alert  Post-procedure vital signs: reviewed and stable  Pain management: adequate  Airway patency: patent  PONV status at discharge: No PONV  Anesthetic complications: no      Cardiovascular status: blood pressure returned to baseline  Respiratory status: unassisted  Hydration status: euvolemic  Follow-up not needed.        Visit Vitals  BP (!) 86/54   Pulse 103   Temp 36.6 °C (97.9 °F) (Oral)   Resp 19   Ht 5' 9" (1.753 m)   Wt 58.1 kg (128 lb)   SpO2 100%   BMI 18.90 kg/m²       Pain/Sheila Score: Pain Assessment Performed: Yes (10/19/2018  1:20 PM)  Presence of Pain: denies (10/19/2018  5:55 AM)  Pain Rating Prior to Med Admin: 0 (10/19/2018  1:43 PM)        "

## 2018-10-19 NOTE — PLAN OF CARE
Pt resting comfortably.    Call light in reach.    No questions or concerns at this time.    Pt belongings with family

## 2018-10-20 PROBLEM — K66.0 ABDOMINAL ADHESIONS: Status: ACTIVE | Noted: 2018-10-20

## 2018-10-20 LAB
ALBUMIN SERPL BCP-MCNC: 2.4 G/DL
ALP SERPL-CCNC: 56 U/L
ALT SERPL W/O P-5'-P-CCNC: 34 U/L
ANION GAP SERPL CALC-SCNC: 6 MMOL/L
AST SERPL-CCNC: 29 U/L
BASOPHILS # BLD AUTO: 0.03 K/UL
BASOPHILS NFR BLD: 0.2 %
BILIRUB SERPL-MCNC: 1.9 MG/DL
BUN SERPL-MCNC: 11 MG/DL
CALCIUM SERPL-MCNC: 7.7 MG/DL
CHLORIDE SERPL-SCNC: 108 MMOL/L
CO2 SERPL-SCNC: 26 MMOL/L
CREAT SERPL-MCNC: 0.6 MG/DL
DIFFERENTIAL METHOD: ABNORMAL
EOSINOPHIL # BLD AUTO: 0 K/UL
EOSINOPHIL NFR BLD: 0.1 %
ERYTHROCYTE [DISTWIDTH] IN BLOOD BY AUTOMATED COUNT: 13 %
EST. GFR  (AFRICAN AMERICAN): >60 ML/MIN/1.73 M^2
EST. GFR  (NON AFRICAN AMERICAN): >60 ML/MIN/1.73 M^2
GLUCOSE SERPL-MCNC: 132 MG/DL
HCT VFR BLD AUTO: 37.3 %
HGB BLD-MCNC: 11.8 G/DL
IMM GRANULOCYTES # BLD AUTO: 0.05 K/UL
IMM GRANULOCYTES NFR BLD AUTO: 0.4 %
LYMPHOCYTES # BLD AUTO: 1.7 K/UL
LYMPHOCYTES NFR BLD: 13.2 %
MAGNESIUM SERPL-MCNC: 1.8 MG/DL
MCH RBC QN AUTO: 29.1 PG
MCHC RBC AUTO-ENTMCNC: 31.6 G/DL
MCV RBC AUTO: 92 FL
MONOCYTES # BLD AUTO: 0.8 K/UL
MONOCYTES NFR BLD: 6.5 %
NEUTROPHILS # BLD AUTO: 10.1 K/UL
NEUTROPHILS NFR BLD: 79.6 %
NRBC BLD-RTO: 0 /100 WBC
PHOSPHATE SERPL-MCNC: 3.6 MG/DL
PLATELET # BLD AUTO: 291 K/UL
PMV BLD AUTO: 10.8 FL
POTASSIUM SERPL-SCNC: 4.1 MMOL/L
PROT SERPL-MCNC: 4.5 G/DL
RBC # BLD AUTO: 4.05 M/UL
SODIUM SERPL-SCNC: 140 MMOL/L
WBC # BLD AUTO: 12.64 K/UL

## 2018-10-20 PROCEDURE — 36415 COLL VENOUS BLD VENIPUNCTURE: CPT

## 2018-10-20 PROCEDURE — 47600 CHOLECYSTECTOMY: CPT | Mod: 51,,, | Performed by: SURGERY

## 2018-10-20 PROCEDURE — 63600175 PHARM REV CODE 636 W HCPCS: Performed by: STUDENT IN AN ORGANIZED HEALTH CARE EDUCATION/TRAINING PROGRAM

## 2018-10-20 PROCEDURE — 27000221 HC OXYGEN, UP TO 24 HOURS

## 2018-10-20 PROCEDURE — 94640 AIRWAY INHALATION TREATMENT: CPT

## 2018-10-20 PROCEDURE — C9113 INJ PANTOPRAZOLE SODIUM, VIA: HCPCS | Performed by: SURGERY

## 2018-10-20 PROCEDURE — 25000242 PHARM REV CODE 250 ALT 637 W/ HCPCS: Performed by: STUDENT IN AN ORGANIZED HEALTH CARE EDUCATION/TRAINING PROGRAM

## 2018-10-20 PROCEDURE — 20600001 HC STEP DOWN PRIVATE ROOM

## 2018-10-20 PROCEDURE — 83735 ASSAY OF MAGNESIUM: CPT

## 2018-10-20 PROCEDURE — 80053 COMPREHEN METABOLIC PANEL: CPT

## 2018-10-20 PROCEDURE — 63600175 PHARM REV CODE 636 W HCPCS: Performed by: SURGERY

## 2018-10-20 PROCEDURE — 25000003 PHARM REV CODE 250: Performed by: SURGERY

## 2018-10-20 PROCEDURE — 44121 REMOVAL OF SMALL INTESTINE: CPT | Mod: ,,, | Performed by: SURGERY

## 2018-10-20 PROCEDURE — 44120 REMOVAL OF SMALL INTESTINE: CPT | Mod: 22,,, | Performed by: SURGERY

## 2018-10-20 PROCEDURE — 44015 INSERT NEEDLE CATH BOWEL: CPT | Mod: ,,, | Performed by: SURGERY

## 2018-10-20 PROCEDURE — 84100 ASSAY OF PHOSPHORUS: CPT

## 2018-10-20 PROCEDURE — 85025 COMPLETE CBC W/AUTO DIFF WBC: CPT

## 2018-10-20 RX ORDER — MAGNESIUM SULFATE HEPTAHYDRATE 40 MG/ML
2 INJECTION, SOLUTION INTRAVENOUS ONCE
Status: COMPLETED | OUTPATIENT
Start: 2018-10-20 | End: 2018-10-20

## 2018-10-20 RX ORDER — LEVALBUTEROL INHALATION SOLUTION 0.63 MG/3ML
0.63 SOLUTION RESPIRATORY (INHALATION) ONCE
Status: COMPLETED | OUTPATIENT
Start: 2018-10-20 | End: 2018-10-20

## 2018-10-20 RX ORDER — KETOROLAC TROMETHAMINE 30 MG/ML
30 INJECTION, SOLUTION INTRAMUSCULAR; INTRAVENOUS EVERY 8 HOURS
Status: COMPLETED | OUTPATIENT
Start: 2018-10-20 | End: 2018-10-22

## 2018-10-20 RX ADMIN — ENOXAPARIN SODIUM 40 MG: 100 INJECTION SUBCUTANEOUS at 06:10

## 2018-10-20 RX ADMIN — LEVALBUTEROL HYDROCHLORIDE 0.63 MG: 0.63 SOLUTION RESPIRATORY (INHALATION) at 03:10

## 2018-10-20 RX ADMIN — SODIUM CHLORIDE, SODIUM LACTATE, POTASSIUM CHLORIDE, AND CALCIUM CHLORIDE: 600; 310; 30; 20 INJECTION, SOLUTION INTRAVENOUS at 04:10

## 2018-10-20 RX ADMIN — PANTOPRAZOLE SODIUM 40 MG: 40 INJECTION, POWDER, FOR SOLUTION INTRAVENOUS at 09:10

## 2018-10-20 RX ADMIN — METOPROLOL TARTRATE 5 MG: 5 INJECTION, SOLUTION INTRAVENOUS at 06:10

## 2018-10-20 RX ADMIN — METOPROLOL TARTRATE 5 MG: 5 INJECTION, SOLUTION INTRAVENOUS at 11:10

## 2018-10-20 RX ADMIN — SODIUM CHLORIDE, SODIUM LACTATE, POTASSIUM CHLORIDE, AND CALCIUM CHLORIDE: 600; 310; 30; 20 INJECTION, SOLUTION INTRAVENOUS at 01:10

## 2018-10-20 RX ADMIN — METOPROLOL TARTRATE 5 MG: 5 INJECTION, SOLUTION INTRAVENOUS at 01:10

## 2018-10-20 RX ADMIN — METOPROLOL TARTRATE 5 MG: 5 INJECTION, SOLUTION INTRAVENOUS at 12:10

## 2018-10-20 RX ADMIN — KETOROLAC TROMETHAMINE 30 MG: 30 INJECTION, SOLUTION INTRAMUSCULAR at 03:10

## 2018-10-20 RX ADMIN — MAGNESIUM SULFATE IN WATER 2 G: 40 INJECTION, SOLUTION INTRAVENOUS at 10:10

## 2018-10-20 RX ADMIN — SODIUM CHLORIDE, SODIUM LACTATE, POTASSIUM CHLORIDE, AND CALCIUM CHLORIDE: 600; 310; 30; 20 INJECTION, SOLUTION INTRAVENOUS at 09:10

## 2018-10-20 RX ADMIN — KETOROLAC TROMETHAMINE 30 MG: 30 INJECTION, SOLUTION INTRAMUSCULAR at 10:10

## 2018-10-20 RX ADMIN — SODIUM CHLORIDE, SODIUM LACTATE, POTASSIUM CHLORIDE, AND CALCIUM CHLORIDE: 600; 310; 30; 20 INJECTION, SOLUTION INTRAVENOUS at 10:10

## 2018-10-20 NOTE — ASSESSMENT & PLAN NOTE
36 yo male s/p duodenal resection with primary anastomosis    Plan:  - Continue NG tube  - Flush J-tube q6h  - NO CRUSHED MEDICATIONS THROUGH J-TUBE AT ALL  - MIVF  - Replace electrolytes  - DVT prophylaxis  - Toradol  - OOB to chair/ambulate

## 2018-10-20 NOTE — SUBJECTIVE & OBJECTIVE
Interval History: No acute events overnight. States pain is well controlled with PCA.     Medications:  Continuous Infusions:   hydromorphone in 0.9 % NaCl 6 mg/30 ml      lactated ringers 125 mL/hr at 10/20/18 0100     Scheduled Meds:   enoxaparin  40 mg Subcutaneous Daily    ketorolac  30 mg Intravenous Q8H    metoprolol  5 mg Intravenous Q6H    pantoprazole  40 mg Intravenous Daily     PRN Meds:naloxone, ondansetron, promethazine (PHENERGAN) IVPB     Review of patient's allergies indicates:   Allergen Reactions    Kiwi (actinidia chinensis)      Objective:     Vital Signs (Most Recent):  Temp: 98.4 °F (36.9 °C) (10/20/18 0601)  Pulse: 86(Post-IV Metoprolol admin) (10/20/18 0610)  Resp: 16 (10/20/18 0601)  BP: 113/73(Post-IV Metoprolol admin) (10/20/18 0610)  SpO2: 95 % (10/20/18 0601) Vital Signs (24h Range):  Temp:  [96.7 °F (35.9 °C)-99.2 °F (37.3 °C)] 98.4 °F (36.9 °C)  Pulse:  [] 86  Resp:  [13-22] 16  SpO2:  [95 %-100 %] 95 %  BP: ()/(50-74) 113/73     Weight: 58.1 kg (128 lb)  Body mass index is 18.9 kg/m².    Intake/Output - Last 3 Shifts       10/18 0700 - 10/19 0659 10/19 0700 - 10/20 0659 10/20 0700 - 10/21 0659    I.V. (mL/kg)  5348 (92)     IV Piggyback  1000     Total Intake(mL/kg)  6348 (109.3)     Urine (mL/kg/hr)  2000 (1.4)     Emesis/NG output  950     Drains  200     Blood  200     Total Output  3350     Net  +2998                  Physical Exam   Constitutional: He appears well-developed and well-nourished. No distress.   Cardiovascular: Normal rate and regular rhythm.   Pulmonary/Chest: Effort normal. No respiratory distress.   Abdominal: Soft. He exhibits no distension (appropriate post op tenderness). There is tenderness. There is no rebound and no guarding.   Incision is clean, dry and intact  J-tube in place         Significant Labs:  CBC:   Recent Labs   Lab 10/20/18  0433   WBC 12.64   RBC 4.05*   HGB 11.8*   HCT 37.3*      MCV 92   MCH 29.1   MCHC 31.6*      CMP:   Recent Labs   Lab 10/20/18  0433   *   CALCIUM 7.7*   ALBUMIN 2.4*   PROT 4.5*      K 4.1   CO2 26      BUN 11   CREATININE 0.6   ALKPHOS 56   ALT 34   AST 29   BILITOT 1.9*

## 2018-10-20 NOTE — OP NOTE
DATE OF PROCEDURE:  10/19/2018.    OPERATING SURGEON:  Yovany Terry M.D.    ASSISTANT:  Chaya Singh M.D. (RES).    PREOPERATIVE DIAGNOSES:  Blind loop syndrome and malnutrition.    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    OPERATIVE PROCEDURES:  Extensive lysis of adhesions (1.5 hours); resection of   duodenal segments 3 and 4; revision of duodenojejunostomy; cholecystectomy;   Witzel jejunostomy.    PROCEDURE IN DETAIL:  The patient is placed in the supine position on the   operating table.  Adequate general endotracheal anesthesia is induced.  The   abdomen is prepped and draped in sterile fashion and entered through an upper   midline incision extended to just below the umbilicus.  There are extremely   dense adhesions from the patient's previous surgery.  In addition, there is   massive dilation of the duodenum to an extent, which almost has to be seen to be   believed.  Due to the dense adhesions and the extremely abnormal pathologic   anatomy, the dissection is quite difficult and requires about 1.5 hours more   than would be normally anticipated and for this reason, a 22 modifier has been   added to this case to reflect the additional degree of difficulty.  Adhesions to   the anterior abdominal wall are taken down.  Next, adhesions in the subhepatic   space are taken down.  The hepatic flexure of the colon is mobilized downward,   exposing the massively dilated duodenum.  I should also mention that this   patient's entire GI tract, primarily the small bowel, but to some extent the   colon also, is quite abnormal in color, being almost the same tan density as the   liver.  I do not know if there is any clinical significance to this, but   thought it was worthwhile noting.  The patient's previous duodenojejunostomy   appears to be patent.  I take down all the small bowel adhesions, so that I can   run the bowel to the ileocecal valve to be sure  that I do not make any mistakes   in identifying the patient's anatomy.  The previous duodenojejunostomy is a   simple loop duodenojejunostomy to the proximal jejunum, perhaps 20 cm distal to   the ligament of Treitz.  As mentioned before, the entire duodenum is massively   dilated, being probably 10 cm or more in diameter.  I divide the jejunum just   proximal to the duodenojejunostomy and then begin to mobilize the duodenojejunal   flexure, taking down the fourth portion of the duodenum using the LigaSure and   then carefully beginning to separate the third portion of the duodenum from the   uncinate process of the pancreas.  The duodenum is then passed behind the   vessels to the right upper quadrant and I continue our mobilization of the   duodenum.  Because of the greatly altered anatomy, it is difficult to be   entirely sure where the ampulla of Vater is and I therefore elect to perform a   cholecystectomy and then pass a biliary Mirella down the cystic duct stump into   the duodenum, inflating the balloon and pulling it back until the balloon   impacts on the ampulla and I then can definitely identify the location of the   ampulla and make completely certain that I do not transect or disrupt this with   our duodenal resection.  Using the ALLAN 75 mm stapler, the duodenum is divided at   the junction of D2 and D3 just about 2 cm below the point at which the ampulla   enters the medial wall of the duodenum.  It requires two staple loads to divide   the duodenum, which gives one an idea of just how massively dilated this is.    The staple line is then inverted with interrupted 3-0 silk.  The patient now has   only the proximal duodenum that is the bulb, first portion and second portion   to just below the ampulla.  This is of course in continuity with the pylorus and   the stomach proximally.  To make sure that the duodenum is well drained, I   elect to extend the duodenojejunostomy.  I especially want to be sure  that we do   not leave any significant pouch or blind loop distally.  I, therefore, make an   enterotomy right at the distal aspect of the patient's old duodenojejunostomy.    The ALLAN blue staple load is then inserted with one limb down into D2 and the   other limb of the stapler in the jejunum and fired, greatly extending the   duodenojejunostomy to extend well down into D2.  The transverse enterotomy is   then closed with interrupted 3-0 Vicryl.  Our biliary Mirella is then removed by   the way through the small enterotomy through which we stapled our anastomosis.    I once again palpate the balloon in the medial wall of the duodenum, confirming   that the ampullary complex is intact.  After removing the biliary Fogerty, the   cystic duct stump is ligated x2 with 2-0 silk.  The operative field is then   irrigated with sterile saline.  Approximately 20 cm downstream from our   duodenojejunostomy, a 12-Tongan Witzel jejunostomy is placed in the usual   fashion.  Exparel solution is infiltrated into the deep muscular layer on either   side of the incision.  The abdominal incision is then closed in layers in the   usual fashion.  A sterile dressing is applied.  The patient tolerates the   procedure well and is brought to the Recovery Room in stable condition.      ASHLEY  dd: 10/20/2018 08:26:52 (CDT)  td: 10/20/2018 10:31:00 (CDT)  Doc ID   #[id number]  Job ID #[id number]    CC:

## 2018-10-20 NOTE — NURSING
Notified Dr Murdock of patient's o2 sat at 88% on room air, as well as the patient having crackles and exp wheezing on auscultation.  Placed patient on 2L of o2, o2 sats up to 93%. MD placing new orders.

## 2018-10-20 NOTE — PROGRESS NOTES
[facility]  General Surgery  Progress Note    Subjective:     History of Present Illness:  No notes on file    Post-Op Info:  Procedure(s) (LRB):  EXCISION, duodenal resection (N/A)  REVISION, jejunostomy (N/A)  CHOLECYSTECTOMY  CREATION, JEJUNOSTOMY   1 Day Post-Op     Interval History: No acute events overnight. States pain is well controlled with PCA.     Medications:  Continuous Infusions:   hydromorphone in 0.9 % NaCl 6 mg/30 ml      lactated ringers 125 mL/hr at 10/20/18 0100     Scheduled Meds:   enoxaparin  40 mg Subcutaneous Daily    ketorolac  30 mg Intravenous Q8H    metoprolol  5 mg Intravenous Q6H    pantoprazole  40 mg Intravenous Daily     PRN Meds:naloxone, ondansetron, promethazine (PHENERGAN) IVPB     Review of patient's allergies indicates:   Allergen Reactions    Kiwi (actinidia chinensis)      Objective:     Vital Signs (Most Recent):  Temp: 98.4 °F (36.9 °C) (10/20/18 0601)  Pulse: 86(Post-IV Metoprolol admin) (10/20/18 0610)  Resp: 16 (10/20/18 0601)  BP: 113/73(Post-IV Metoprolol admin) (10/20/18 0610)  SpO2: 95 % (10/20/18 0601) Vital Signs (24h Range):  Temp:  [96.7 °F (35.9 °C)-99.2 °F (37.3 °C)] 98.4 °F (36.9 °C)  Pulse:  [] 86  Resp:  [13-22] 16  SpO2:  [95 %-100 %] 95 %  BP: ()/(50-74) 113/73     Weight: 58.1 kg (128 lb)  Body mass index is 18.9 kg/m².    Intake/Output - Last 3 Shifts       10/18 0700 - 10/19 0659 10/19 0700 - 10/20 0659 10/20 0700 - 10/21 0659    I.V. (mL/kg)  5348 (92)     IV Piggyback  1000     Total Intake(mL/kg)  6348 (109.3)     Urine (mL/kg/hr)  2000 (1.4)     Emesis/NG output  950     Drains  200     Blood  200     Total Output  3350     Net  +2998                  Physical Exam   Constitutional: He appears well-developed and well-nourished. No distress.   Cardiovascular: Normal rate and regular rhythm.   Pulmonary/Chest: Effort normal. No respiratory distress.   Abdominal: Soft. He exhibits no distension (appropriate post  op tenderness). There is tenderness. There is no rebound and no guarding.   Incision is clean, dry and intact  J-tube in place         Significant Labs:  CBC:   Recent Labs   Lab 10/20/18  0433   WBC 12.64   RBC 4.05*   HGB 11.8*   HCT 37.3*      MCV 92   MCH 29.1   MCHC 31.6*     CMP:   Recent Labs   Lab 10/20/18  0433   *   CALCIUM 7.7*   ALBUMIN 2.4*   PROT 4.5*      K 4.1   CO2 26      BUN 11   CREATININE 0.6   ALKPHOS 56   ALT 34   AST 29   BILITOT 1.9*     Assessment/Plan:     * Blind duodenal loop syndrome    36 yo male s/p duodenal resection with primary anastomosis    Plan:  - Continue NG tube  - Flush J-tube q6h  - NO CRUSHED MEDICATIONS THROUGH J-TUBE AT ALL  - MIVF  - Replace electrolytes  - DVT prophylaxis  - Toradol  - OOB to chair/ambulate         Felicia Bethea MD  General Surgery  [facility]

## 2018-10-21 LAB
ALBUMIN SERPL BCP-MCNC: 2.4 G/DL
ALP SERPL-CCNC: 63 U/L
ALT SERPL W/O P-5'-P-CCNC: 29 U/L
ANION GAP SERPL CALC-SCNC: 7 MMOL/L
AST SERPL-CCNC: 33 U/L
BASOPHILS # BLD AUTO: 0.03 K/UL
BASOPHILS NFR BLD: 0.3 %
BILIRUB SERPL-MCNC: 1.9 MG/DL
BUN SERPL-MCNC: 14 MG/DL
CALCIUM SERPL-MCNC: 8.1 MG/DL
CHLORIDE SERPL-SCNC: 102 MMOL/L
CO2 SERPL-SCNC: 28 MMOL/L
CREAT SERPL-MCNC: 0.6 MG/DL
DIFFERENTIAL METHOD: ABNORMAL
EOSINOPHIL # BLD AUTO: 0 K/UL
EOSINOPHIL NFR BLD: 0.4 %
ERYTHROCYTE [DISTWIDTH] IN BLOOD BY AUTOMATED COUNT: 12.8 %
EST. GFR  (AFRICAN AMERICAN): >60 ML/MIN/1.73 M^2
EST. GFR  (NON AFRICAN AMERICAN): >60 ML/MIN/1.73 M^2
GLUCOSE SERPL-MCNC: 73 MG/DL
HCT VFR BLD AUTO: 34.6 %
HGB BLD-MCNC: 10.7 G/DL
IMM GRANULOCYTES # BLD AUTO: 0.04 K/UL
IMM GRANULOCYTES NFR BLD AUTO: 0.4 %
LYMPHOCYTES # BLD AUTO: 1.3 K/UL
LYMPHOCYTES NFR BLD: 13.8 %
MAGNESIUM SERPL-MCNC: 1.9 MG/DL
MCH RBC QN AUTO: 29.6 PG
MCHC RBC AUTO-ENTMCNC: 30.9 G/DL
MCV RBC AUTO: 96 FL
MONOCYTES # BLD AUTO: 0.5 K/UL
MONOCYTES NFR BLD: 5.7 %
NEUTROPHILS # BLD AUTO: 7.5 K/UL
NEUTROPHILS NFR BLD: 79.4 %
NRBC BLD-RTO: 0 /100 WBC
PHOSPHATE SERPL-MCNC: 2.7 MG/DL
PLATELET # BLD AUTO: 193 K/UL
PMV BLD AUTO: 10.9 FL
POTASSIUM SERPL-SCNC: 3.9 MMOL/L
PROT SERPL-MCNC: 4.6 G/DL
RBC # BLD AUTO: 3.62 M/UL
SODIUM SERPL-SCNC: 137 MMOL/L
WBC # BLD AUTO: 9.42 K/UL

## 2018-10-21 PROCEDURE — 84100 ASSAY OF PHOSPHORUS: CPT

## 2018-10-21 PROCEDURE — C9113 INJ PANTOPRAZOLE SODIUM, VIA: HCPCS | Performed by: SURGERY

## 2018-10-21 PROCEDURE — 27000221 HC OXYGEN, UP TO 24 HOURS

## 2018-10-21 PROCEDURE — 85025 COMPLETE CBC W/AUTO DIFF WBC: CPT

## 2018-10-21 PROCEDURE — 20600001 HC STEP DOWN PRIVATE ROOM

## 2018-10-21 PROCEDURE — 25000003 PHARM REV CODE 250: Performed by: SURGERY

## 2018-10-21 PROCEDURE — 94761 N-INVAS EAR/PLS OXIMETRY MLT: CPT

## 2018-10-21 PROCEDURE — 36415 COLL VENOUS BLD VENIPUNCTURE: CPT

## 2018-10-21 PROCEDURE — 83735 ASSAY OF MAGNESIUM: CPT

## 2018-10-21 PROCEDURE — 63600175 PHARM REV CODE 636 W HCPCS: Performed by: STUDENT IN AN ORGANIZED HEALTH CARE EDUCATION/TRAINING PROGRAM

## 2018-10-21 PROCEDURE — 25000242 PHARM REV CODE 250 ALT 637 W/ HCPCS: Performed by: STUDENT IN AN ORGANIZED HEALTH CARE EDUCATION/TRAINING PROGRAM

## 2018-10-21 PROCEDURE — 94640 AIRWAY INHALATION TREATMENT: CPT

## 2018-10-21 PROCEDURE — 94770 HC EXHALED C02 TEST: CPT

## 2018-10-21 PROCEDURE — 63600175 PHARM REV CODE 636 W HCPCS: Performed by: SURGERY

## 2018-10-21 PROCEDURE — 80053 COMPREHEN METABOLIC PANEL: CPT

## 2018-10-21 PROCEDURE — 99900035 HC TECH TIME PER 15 MIN (STAT)

## 2018-10-21 RX ORDER — ACETAMINOPHEN 10 MG/ML
1000 INJECTION, SOLUTION INTRAVENOUS EVERY 8 HOURS
Status: COMPLETED | OUTPATIENT
Start: 2018-10-21 | End: 2018-10-22

## 2018-10-21 RX ORDER — LEVALBUTEROL INHALATION SOLUTION 0.63 MG/3ML
0.63 SOLUTION RESPIRATORY (INHALATION) ONCE
Status: COMPLETED | OUTPATIENT
Start: 2018-10-21 | End: 2018-10-21

## 2018-10-21 RX ADMIN — KETOROLAC TROMETHAMINE 30 MG: 30 INJECTION, SOLUTION INTRAMUSCULAR at 05:10

## 2018-10-21 RX ADMIN — ENOXAPARIN SODIUM 40 MG: 100 INJECTION SUBCUTANEOUS at 05:10

## 2018-10-21 RX ADMIN — METOPROLOL TARTRATE 5 MG: 5 INJECTION, SOLUTION INTRAVENOUS at 05:10

## 2018-10-21 RX ADMIN — SODIUM CHLORIDE, SODIUM LACTATE, POTASSIUM CHLORIDE, AND CALCIUM CHLORIDE: 600; 310; 30; 20 INJECTION, SOLUTION INTRAVENOUS at 05:10

## 2018-10-21 RX ADMIN — LEVALBUTEROL HYDROCHLORIDE 0.63 MG: 0.63 SOLUTION RESPIRATORY (INHALATION) at 03:10

## 2018-10-21 RX ADMIN — ACETAMINOPHEN 1000 MG: 10 INJECTION, SOLUTION INTRAVENOUS at 09:10

## 2018-10-21 RX ADMIN — KETOROLAC TROMETHAMINE 30 MG: 30 INJECTION, SOLUTION INTRAMUSCULAR at 09:10

## 2018-10-21 RX ADMIN — PANTOPRAZOLE SODIUM 40 MG: 40 INJECTION, POWDER, FOR SOLUTION INTRAVENOUS at 09:10

## 2018-10-21 RX ADMIN — KETOROLAC TROMETHAMINE 30 MG: 30 INJECTION, SOLUTION INTRAMUSCULAR at 02:10

## 2018-10-21 RX ADMIN — ACETAMINOPHEN 1000 MG: 10 INJECTION, SOLUTION INTRAVENOUS at 02:10

## 2018-10-21 RX ADMIN — Medication: at 10:10

## 2018-10-21 RX ADMIN — METOPROLOL TARTRATE 5 MG: 5 INJECTION, SOLUTION INTRAVENOUS at 12:10

## 2018-10-21 RX ADMIN — METOPROLOL TARTRATE 5 MG: 5 INJECTION, SOLUTION INTRAVENOUS at 11:10

## 2018-10-21 NOTE — SUBJECTIVE & OBJECTIVE
Interval History:     -No acute events overnight. VSS. Afebrile.  -Noting adequate pain control overnight. Only minor irritation from NGT overall.   -Was able to get OOB to chair yesterday, tolerated well yesterday.     Medications:  Continuous Infusions:   hydromorphone in 0.9 % NaCl 6 mg/30 ml      lactated ringers 125 mL/hr at 10/20/18 2200     Scheduled Meds:   acetaminophen  1,000 mg Intravenous Q8H    enoxaparin  40 mg Subcutaneous Daily    ketorolac  30 mg Intravenous Q8H    lactated ringers  500 mL Intravenous Once    metoprolol  5 mg Intravenous Q6H    pantoprazole  40 mg Intravenous Daily     PRN Meds:naloxone, ondansetron, promethazine (PHENERGAN) IVPB     Review of patient's allergies indicates:   Allergen Reactions    Kiwi (actinidia chinensis)      Objective:     Vital Signs (Most Recent):  Temp: 97.5 °F (36.4 °C) (10/21/18 0755)  Pulse: 103 (10/21/18 0755)  Resp: 16 (10/21/18 0755)  BP: 122/79 (10/21/18 0755)  SpO2: (!) 91 % (10/21/18 0755) Vital Signs (24h Range):  Temp:  [97.5 °F (36.4 °C)-99.3 °F (37.4 °C)] 97.5 °F (36.4 °C)  Pulse:  [] 103  Resp:  [16-18] 16  SpO2:  [88 %-99 %] 91 %  BP: (105-132)/(55-85) 122/79     Weight: 58.1 kg (127 lb 16 oz)  Body mass index is 18.9 kg/m².    Intake/Output - Last 3 Shifts       10/19 0700 - 10/20 0659 10/20 0700 - 10/21 0659 10/21 0700 - 10/22 0659    P.O.  0     I.V. (mL/kg) 5348 (92) 2516.7 (43.3)     NG/GT  60     IV Piggyback 1000 500     Total Intake(mL/kg) 6348 (109.3) 3076.7 (53)     Urine (mL/kg/hr) 2000 (1.4) 950 (0.7)     Emesis/NG output 950      Drains 200 850     Stool  0     Blood 200      Total Output 3350 1800     Net +2998 +1276.7            Stool Occurrence  0 x           Physical Exam   Constitutional: He appears well-developed and well-nourished. No distress.   Cardiovascular: Normal rate and regular rhythm.   Pulmonary/Chest: Effort normal. No respiratory distress.   Abdominal: Soft. He exhibits no distension (appropriate  post op tenderness). There is tenderness. There is no rebound and no guarding.   Incision is clean, dry and intact  J-tube in place         Significant Labs:  CBC:   Recent Labs   Lab 10/21/18  0352   WBC 9.42   RBC 3.62*   HGB 10.7*   HCT 34.6*      MCV 96   MCH 29.6   MCHC 30.9*     CMP:   Recent Labs   Lab 10/21/18  0352   GLU 73   CALCIUM 8.1*   ALBUMIN 2.4*   PROT 4.6*      K 3.9   CO2 28      BUN 14   CREATININE 0.6   ALKPHOS 63   ALT 29   AST 33   BILITOT 1.9*       Significant Diagnostics:  I have reviewed all pertinent imaging results/findings within the past 24 hours.

## 2018-10-21 NOTE — PLAN OF CARE
Problem: Patient Care Overview  Goal: Plan of Care Review  Outcome: Ongoing (interventions implemented as appropriate)  POC reviewed with patient. AAOx4. VSS. Patient pain controlled with scheduled toradol and pain pump. Patient remained in chair for 2 hours. NG tube remains in place to low intermittent suction. Drainage of a total of 300mL. J tube intact; flushed per orders. Patient has no other concerns at this time. Call light within reach. TM.

## 2018-10-21 NOTE — PROGRESS NOTES
[facility]  General Surgery  Progress Note    Subjective:     History of Present Illness:  No notes on file    Post-Op Info:  Procedure(s) (LRB):  EXCISION, duodenal resection (N/A)  REVISION, jejunostomy (N/A)  CHOLECYSTECTOMY  CREATION, JEJUNOSTOMY   2 Days Post-Op     Interval History:     -No acute events overnight. VSS. Afebrile.  -Noting adequate pain control overnight. Only minor irritation from NGT overall.   -Was able to get OOB to chair yesterday, tolerated well yesterday.     Medications:  Continuous Infusions:   hydromorphone in 0.9 % NaCl 6 mg/30 ml      lactated ringers 125 mL/hr at 10/20/18 2200     Scheduled Meds:   acetaminophen  1,000 mg Intravenous Q8H    enoxaparin  40 mg Subcutaneous Daily    ketorolac  30 mg Intravenous Q8H    lactated ringers  500 mL Intravenous Once    metoprolol  5 mg Intravenous Q6H    pantoprazole  40 mg Intravenous Daily     PRN Meds:naloxone, ondansetron, promethazine (PHENERGAN) IVPB     Review of patient's allergies indicates:   Allergen Reactions    Kiwi (actinidia chinensis)      Objective:     Vital Signs (Most Recent):  Temp: 97.5 °F (36.4 °C) (10/21/18 0755)  Pulse: 103 (10/21/18 0755)  Resp: 16 (10/21/18 0755)  BP: 122/79 (10/21/18 0755)  SpO2: (!) 91 % (10/21/18 0755) Vital Signs (24h Range):  Temp:  [97.5 °F (36.4 °C)-99.3 °F (37.4 °C)] 97.5 °F (36.4 °C)  Pulse:  [] 103  Resp:  [16-18] 16  SpO2:  [88 %-99 %] 91 %  BP: (105-132)/(55-85) 122/79     Weight: 58.1 kg (127 lb 16 oz)  Body mass index is 18.9 kg/m².    Intake/Output - Last 3 Shifts       10/19 0700 - 10/20 0659 10/20 0700 - 10/21 0659 10/21 0700 - 10/22 0659    P.O.  0     I.V. (mL/kg) 5348 (92) 2516.7 (43.3)     NG/GT  60     IV Piggyback 1000 500     Total Intake(mL/kg) 6348 (109.3) 3076.7 (53)     Urine (mL/kg/hr) 2000 (1.4) 950 (0.7)     Emesis/NG output 950      Drains 200 850     Stool  0     Blood 200      Total Output 3350 1800     Net +2998 +1276.7             Stool Occurrence  0 x           Physical Exam   Constitutional: He appears well-developed and well-nourished. No distress.   Cardiovascular: Normal rate and regular rhythm.   Pulmonary/Chest: Effort normal. No respiratory distress.   Abdominal: Soft. He exhibits no distension (appropriate post op tenderness). There is tenderness. There is no rebound and no guarding.   Incision is clean, dry and intact  J-tube in place         Significant Labs:  CBC:   Recent Labs   Lab 10/21/18  0352   WBC 9.42   RBC 3.62*   HGB 10.7*   HCT 34.6*      MCV 96   MCH 29.6   MCHC 30.9*     CMP:   Recent Labs   Lab 10/21/18  0352   GLU 73   CALCIUM 8.1*   ALBUMIN 2.4*   PROT 4.6*      K 3.9   CO2 28      BUN 14   CREATININE 0.6   ALKPHOS 63   ALT 29   AST 33   BILITOT 1.9*       Significant Diagnostics:  I have reviewed all pertinent imaging results/findings within the past 24 hours.    Assessment/Plan:     * Blind duodenal loop syndrome    36 yo male s/p duodenal resection with primary anastomosis    Plan:  - Continue NG tube  - Flush J-tube q6h  - NO CRUSHED MEDICATIONS THROUGH J-TUBE AT ALL  - MIVF  - Replace electrolytes  - DVT prophylaxis  - Scheduled IV toradol, IV tylenol  - OOB to chair/ambulate  - PT/OT         Agnes Swan MD  General Surgery  [facility]

## 2018-10-21 NOTE — PLAN OF CARE
Problem: Patient Care Overview  Goal: Plan of Care Review  Outcome: Ongoing (interventions implemented as appropriate)  Pt AAOx4, VSS ex HR/Rhythm, in bed with upper siderails raised x2, bed in lowest/locked position, call light/personal belongings within reach. Pt instructed to call for assistance. Pt verbalizes understanding. Pt afebrile at this time.  Proper hand hygiene performed before and after pt care.      Pt on tele, a-fib noted. Rate controlled with IV Lebatolol q6h. HR .   Flushing J-tube q6h, no crushed meds thru tube per MD note.  Pain well controlled with scheduled Toradol administered q8h.   PCA Dilaudid continued.   LR gtt continued at 125cc/hr.   Lung sounds coarse/with crackles.   Pt NPO.  UOP measured via urinal, no bm so far this shift.     Will continue to monitor patient.

## 2018-10-21 NOTE — ASSESSMENT & PLAN NOTE
38 yo male s/p duodenal resection with primary anastomosis    Plan:  - Continue NG tube  - Flush J-tube q6h  - NO CRUSHED MEDICATIONS THROUGH J-TUBE AT ALL  - MIVF  - Replace electrolytes  - DVT prophylaxis  - Scheduled IV toradol, IV tylenol  - OOB to chair/ambulate  - PT/OT

## 2018-10-22 LAB
ALBUMIN SERPL BCP-MCNC: 2.1 G/DL
ALP SERPL-CCNC: 68 U/L
ALT SERPL W/O P-5'-P-CCNC: 32 U/L
ANION GAP SERPL CALC-SCNC: 11 MMOL/L
AST SERPL-CCNC: 33 U/L
BASOPHILS # BLD AUTO: 0.05 K/UL
BASOPHILS NFR BLD: 0.4 %
BILIRUB SERPL-MCNC: 1.8 MG/DL
BUN SERPL-MCNC: 13 MG/DL
CALCIUM SERPL-MCNC: 8.2 MG/DL
CHLORIDE SERPL-SCNC: 105 MMOL/L
CO2 SERPL-SCNC: 22 MMOL/L
CREAT SERPL-MCNC: 0.5 MG/DL
DIFFERENTIAL METHOD: ABNORMAL
EOSINOPHIL # BLD AUTO: 0.2 K/UL
EOSINOPHIL NFR BLD: 1.7 %
ERYTHROCYTE [DISTWIDTH] IN BLOOD BY AUTOMATED COUNT: 12.2 %
EST. GFR  (AFRICAN AMERICAN): >60 ML/MIN/1.73 M^2
EST. GFR  (NON AFRICAN AMERICAN): >60 ML/MIN/1.73 M^2
GLUCOSE SERPL-MCNC: 33 MG/DL
HCT VFR BLD AUTO: 32.5 %
HGB BLD-MCNC: 10.1 G/DL
IMM GRANULOCYTES # BLD AUTO: 0.06 K/UL
IMM GRANULOCYTES NFR BLD AUTO: 0.5 %
LYMPHOCYTES # BLD AUTO: 1 K/UL
LYMPHOCYTES NFR BLD: 7.6 %
MAGNESIUM SERPL-MCNC: 1.5 MG/DL
MCH RBC QN AUTO: 29.9 PG
MCHC RBC AUTO-ENTMCNC: 31.1 G/DL
MCV RBC AUTO: 96 FL
MONOCYTES # BLD AUTO: 0.6 K/UL
MONOCYTES NFR BLD: 4.9 %
NEUTROPHILS # BLD AUTO: 10.7 K/UL
NEUTROPHILS NFR BLD: 84.9 %
NRBC BLD-RTO: 0 /100 WBC
PHOSPHATE SERPL-MCNC: 3.7 MG/DL
PLATELET # BLD AUTO: 166 K/UL
PMV BLD AUTO: 11.1 FL
POCT GLUCOSE: 117 MG/DL (ref 70–110)
POCT GLUCOSE: 129 MG/DL (ref 70–110)
POCT GLUCOSE: 45 MG/DL (ref 70–110)
POTASSIUM SERPL-SCNC: 4 MMOL/L
PROT SERPL-MCNC: 4.7 G/DL
RBC # BLD AUTO: 3.38 M/UL
SODIUM SERPL-SCNC: 138 MMOL/L
WBC # BLD AUTO: 12.58 K/UL

## 2018-10-22 PROCEDURE — 84100 ASSAY OF PHOSPHORUS: CPT

## 2018-10-22 PROCEDURE — 20600001 HC STEP DOWN PRIVATE ROOM

## 2018-10-22 PROCEDURE — 83735 ASSAY OF MAGNESIUM: CPT

## 2018-10-22 PROCEDURE — 97162 PT EVAL MOD COMPLEX 30 MIN: CPT

## 2018-10-22 PROCEDURE — 63600175 PHARM REV CODE 636 W HCPCS: Performed by: STUDENT IN AN ORGANIZED HEALTH CARE EDUCATION/TRAINING PROGRAM

## 2018-10-22 PROCEDURE — G8979 MOBILITY GOAL STATUS: HCPCS | Mod: CI

## 2018-10-22 PROCEDURE — C9113 INJ PANTOPRAZOLE SODIUM, VIA: HCPCS | Performed by: SURGERY

## 2018-10-22 PROCEDURE — 36415 COLL VENOUS BLD VENIPUNCTURE: CPT

## 2018-10-22 PROCEDURE — 63600175 PHARM REV CODE 636 W HCPCS: Performed by: SURGERY

## 2018-10-22 PROCEDURE — 85025 COMPLETE CBC W/AUTO DIFF WBC: CPT

## 2018-10-22 PROCEDURE — G8978 MOBILITY CURRENT STATUS: HCPCS | Mod: CJ

## 2018-10-22 PROCEDURE — 25000003 PHARM REV CODE 250: Performed by: SURGERY

## 2018-10-22 PROCEDURE — 97110 THERAPEUTIC EXERCISES: CPT

## 2018-10-22 PROCEDURE — 97165 OT EVAL LOW COMPLEX 30 MIN: CPT

## 2018-10-22 PROCEDURE — 80053 COMPREHEN METABOLIC PANEL: CPT

## 2018-10-22 PROCEDURE — 97535 SELF CARE MNGMENT TRAINING: CPT

## 2018-10-22 RX ORDER — HYDROCODONE BITARTRATE AND ACETAMINOPHEN 7.5; 325 MG/15ML; MG/15ML
10 SOLUTION ORAL EVERY 4 HOURS PRN
Status: DISCONTINUED | OUTPATIENT
Start: 2018-10-22 | End: 2018-10-25

## 2018-10-22 RX ORDER — HYDROCODONE BITARTRATE AND ACETAMINOPHEN 7.5; 325 MG/15ML; MG/15ML
15 SOLUTION ORAL EVERY 4 HOURS PRN
Status: DISCONTINUED | OUTPATIENT
Start: 2018-10-22 | End: 2018-10-24

## 2018-10-22 RX ORDER — GLUCAGON 1 MG
1 KIT INJECTION
Status: DISCONTINUED | OUTPATIENT
Start: 2018-10-22 | End: 2018-10-27 | Stop reason: HOSPADM

## 2018-10-22 RX ADMIN — HYDROCODONE BITARTRATE AND ACETAMINOPHEN 15 ML: 7.5; 325 SOLUTION ORAL at 07:10

## 2018-10-22 RX ADMIN — ENOXAPARIN SODIUM 40 MG: 100 INJECTION SUBCUTANEOUS at 04:10

## 2018-10-22 RX ADMIN — Medication 12.5 MG: at 08:10

## 2018-10-22 RX ADMIN — KETOROLAC TROMETHAMINE 30 MG: 30 INJECTION, SOLUTION INTRAMUSCULAR at 05:10

## 2018-10-22 RX ADMIN — PANTOPRAZOLE SODIUM 40 MG: 40 INJECTION, POWDER, FOR SOLUTION INTRAVENOUS at 09:10

## 2018-10-22 RX ADMIN — DEXTROSE MONOHYDRATE 25 G: 25 INJECTION, SOLUTION INTRAVENOUS at 10:10

## 2018-10-22 RX ADMIN — ACETAMINOPHEN 1000 MG: 10 INJECTION, SOLUTION INTRAVENOUS at 05:10

## 2018-10-22 RX ADMIN — HYDROCODONE BITARTRATE AND ACETAMINOPHEN 15 ML: 7.5; 325 SOLUTION ORAL at 09:10

## 2018-10-22 RX ADMIN — MAGNESIUM SULFATE HEPTAHYDRATE 3 G: 500 INJECTION, SOLUTION INTRAMUSCULAR; INTRAVENOUS at 01:10

## 2018-10-22 RX ADMIN — Medication 12.5 MG: at 09:10

## 2018-10-22 RX ADMIN — METOPROLOL TARTRATE 5 MG: 5 INJECTION, SOLUTION INTRAVENOUS at 05:10

## 2018-10-22 NOTE — PLAN OF CARE
Problem: Patient Care Overview  Goal: Plan of Care Review  Outcome: Ongoing (interventions implemented as appropriate)  POC reviewed with patient. AAOx4. VSS. Patient denies any pain at this time. Ambulated with PT per walker. Remained from of any falls or trauma. Patient remains NPO with tube feedings to J tube. No reports of nausea or vomiting. Patient's abdomen remains soft and tender (incision), the same as previous assessment. Incentive spirometer encouraged hourly throughout shift; highest volume witnessed was 2000. Patient has no other concerns at this time. Call light within reach. WCTM.

## 2018-10-22 NOTE — PLAN OF CARE
Problem: Physical Therapy Goal  Goal: Physical Therapy Goal  Goals to be met by: Oct. 29     Patient will increase functional independence with mobility by performin. Sit to stand transfer with Modified [place]  2. Bed to chair transfer with Modified [place] using Rolling Walker  3. Gait  x 200 feet with Contact guard assistance using Rolling Walker.   4. Stand for 2 minutes with Stand-by Assistance while performing an activity/standing exercises with no UE support.  5. Pt will be able to stand with feet together, eyes open for 1 minute with SBA and no UE support.      Outcome: Ongoing (interventions implemented as appropriate)  Goals set based on presentation today.

## 2018-10-22 NOTE — PT/OT/SLP EVAL
Physical Therapy Evaluation and treatment    Patient Name:  Casper Fenton   MRN:  [id number]    Recommendations:     Discharge Recommendations:  rehabilitation facility   Discharge Equipment Recommendations: walker, rolling   Barriers to discharge: None    Assessment:     Casper Fenton is a 37 y.o. male admitted with a medical diagnosis of Blind duodenal loop syndrome.  He presents with the following impairments/functional limitations:  impaired self care skills, impaired functional mobilty, gait instability, impaired balance, impaired coordination.  Pt presents with past-pointing via finger-to-nose test and ataxic gait pattern, requiring Min A and use of a rolling walker.  He states that his voice quality has not changed since the onset of his symptoms, but he speaks as though he is hard-of-hearing (OT and nurse confirmed this assertion).  Pt was able to ambulate 100 feet x 2 trials, but even with assistance, he is a high risk for falls due to his high level of instability.  Mr. Fenton requires upper extremity support for balance at all times while standing and will need continued PT services to improve his coordination, balance, and overall functional mobility.  His presentation is evolving, his personal factors include hx of weight loss, low energy level at home, and impaired functional mobility over time. With these qualifying factors, his evaluation is labeled moderate complexity.    Rehab Prognosis:  fair; patient would benefit from acute skilled PT services to address these deficits and reach maximum level of function.      Recent Surgery: Procedure(s) (LRB):  EXCISION, duodenal resection (N/A)  REVISION, jejunostomy (N/A)  CHOLECYSTECTOMY  CREATION, JEJUNOSTOMY 3 Days Post-Op    Plan:     During this hospitalization, patient to be seen 5 x/week to address the above listed problems via gait training, therapeutic activities, therapeutic exercises, neuromuscular re-education  · Plan of Care Expires:   "11/21/18   Plan of Care Reviewed with: patient    Subjective     Communicated with nurse prior to session.  Patient found supine with HOB elevated (sister and aunt present) upon PT entry to room, agreeable to evaluation.      Chief Complaint: "Pain in abdomen that makes me not want to move."  Patient comments/goals: To return to independence.  Pain/Comfort:  · Pain Rating 1: 6/10  · Location - Side 1: (midline)  · Location - Orientation 1: lower  · Location 1: abdomen  · Pain Addressed 1: Reposition  · Pain Rating Post-Intervention 1: 6/10    Patients cultural, spiritual, Jain conflicts given the current situation: none    Living Environment:  Lives with wife and four kids at home. Does not work at this time. Pt was (I) prior to admission, drives. Used to be a  (8 years). Has a walk-in shower with a built-in seat. 1 SH, 1 GARIMA.  Prior to admission, patients level of function was (I).  Patient has the following equipment: none.  DME owned (not currently used): none.  Upon discharge, patient will have assistance from wife.    Objective:     Patient found with:       General Precautions: Standard, fall   Orthopedic Precautions:N/A   Braces: N/A     Exams:  · Cognitive Exam:  Patient is oriented to Person, Place, Time and Situation  · Fine Motor Coordination: -       Impaired  Left hand, finger to nose YES (ataxic movement of upper extremities and past-pointing noted) and Right hand, finger to nose YES (ataxic movement of upper extremities and past-pointing noted)  · Gross Motor Coordination:  ataxia noted throughout entire gait distance  (with/without rolling walker)  · RUE ROM: WNL  · RUE Strength: WNL  · LUE ROM: WNL  · LUE Strength: WNL  · RLE ROM: WNL  · RLE Strength: WNL  · LLE ROM: WNL  · LLE Strength: WNL    Functional Mobility:  · Bed Mobility:  Supine to Sit: supervision with bed rail  · Transfers:  Sit to Stand:  stand by assistance with rolling walker  · Gait: 100 feet without rolling " walker (Min-Mod A); 100 feet with rolling walker (Min A)- ataxia noted for both gait trials.  · Balance: Stood while performing standing exercises with SBA, posterior lean, and bilateral UE support.    AM-PAC 6 CLICK MOBILITY  Total Score:19       Therapeutic Activities and Exercises:   Standing: Hip flexion, hip abduction, heel raises x 10-15 reps with BUE support on bedrail and IV pole.      Patient left up in chair with call button in reach and aunt present.    GOALS:   Multidisciplinary Problems     Physical Therapy Goals        Problem: Physical Therapy Goal    Goal Priority Disciplines Outcome Goal Variances Interventions   Physical Therapy Goal     PT, PT/OT Ongoing (interventions implemented as appropriate)     Description:  Goals to be met by: Oct. 29     Patient will increase functional independence with mobility by performin. Sit to stand transfer with Modified [place]  2. Bed to chair transfer with Modified [place] using Rolling Walker  3. Gait  x 200 feet with Contact guard assistance using Rolling Walker.   4. Stand for 2 minutes with Stand-by Assistance while performing an activity/standing exercises with no UE support.  5. Pt will be able to stand with feet together, eyes open for 1 minute with SBA and no UE support.                        History:     Past Medical History:   Diagnosis Date    Allergy     Asthma     Intestinal atresia     Duodenal or proximal jejunal s/p duodeno-jejunal bypass?    Pancreas divisum     Short bowel syndrome        Past Surgical History:   Procedure Laterality Date    BIOPSY LIVER AND ULTRASOUND N/A 3/1/2016    Performed by [facility] Diagnostic Provider at [facility] OR [address] [place] [address]    CHOLECYSTECTOMY  10/19/2018    Procedure: CHOLECYSTECTOMY;  Surgeon: Yovany Terry MD;  Location: [facility] OR [address] [place] [address];  Service: General;;    CHOLECYSTECTOMY  10/19/2018    Performed by [facility]any Terry MD at [facility] OR [address] [place] [address]    CREATION, JEJUNOSTOMY  10/19/2018    [facility]  CLEM [facility]    DILATATION Bilateral 8/27/2018    Performed by [facility] Jr., [facility] at [facility]    EGD with push enteroscopy N/A 12/7/2015    Performed [facility] at [facility]    ESOPHAGOGASTRODUODENOSCOPY N/A 8/27/2018    Procedure: ESOPHAGOGASTRODUODENOSCOPY (EGD);  Surgeon: León Malhotra Jr., MD;  Location: [facility];  Service: Endoscopy;  Laterality: N/A;    ESOPHAGOGASTRODUODENOSCOPY (EGD) N/A 8/27/2018    Performed by [facility] Jr[facility] at [facility]    EXCISION, duodenal resection N/A 10/19/2018    [facility]    JEJUNOSTOMY  10/19/2018    Procedure: CREATION, JEJUNOSTOMY;  Surgeon: Yovany Terry MD;  Location: [facility] OR [address] [place] [address];  Service: General;;    REVISION, jejunostomy N/A 10/19/2018    Performed by [facility] at [facility] OR [address] [place] [address]    SMALL INTESTINE SURGERY      1985    wisdom tooth removal         Time Tracking:     PT Received On: 10/22/18  PT Start Time: 1400     PT Stop Time: 1430  PT Total Time (min): 30 min     Billable Minutes: Evaluation 1 and Therapeutic Exercise 15      Sierra Chung, PT  10/22/2018

## 2018-10-22 NOTE — PROGRESS NOTES
[facility]  General Surgery  Progress Note    Subjective:     History of Present Illness:  No notes on file    Post-Op Info:  Procedure(s) (LRB):  EXCISION, duodenal resection (N/A)  REVISION, jejunostomy (N/A)  CHOLECYSTECTOMY  CREATION, JEJUNOSTOMY   3 Days Post-Op     Interval History:     No nausea, no bowel function.      Medications:  Continuous Infusions:   hydromorphone in 0.9 % NaCl 6 mg/30 ml      lactated ringers 125 mL/hr at 10/21/18 1729     Scheduled Meds:   enoxaparin  40 mg Subcutaneous Daily    metoprolol  5 mg Intravenous Q6H    pantoprazole  40 mg Intravenous Daily     PRN Meds:naloxone, ondansetron, promethazine (PHENERGAN) IVPB     Review of patient's allergies indicates:   Allergen Reactions    Kiwi (actinidia chinensis)      Objective:     Vital Signs (Most Recent):  Temp: 99.1 °F (37.3 °C) (10/22/18 0514)  Pulse: 86 (10/22/18 0527)  Resp: 16 (10/22/18 0514)  BP: 114/78 (10/22/18 0527)  SpO2: 99 % (10/22/18 0527) Vital Signs (24h Range):  Temp:  [97.5 °F (36.4 °C)-99.1 °F (37.3 °C)] 99.1 °F (37.3 °C)  Pulse:  [] 86  Resp:  [16-18] 16  SpO2:  [91 %-99 %] 99 %  BP: (102-122)/(63-84) 114/78     Weight: 58.1 kg (127 lb 16 oz)  Body mass index is 18.9 kg/m².    Intake/Output - Last 3 Shifts       10/20 0700 - 10/21 0659 10/21 0700 - 10/22 0659    P.O. 0 0    I.V. (mL/kg) 2516.7 (43.3) 2508.3 (43.2)    NG/GT 60 60    IV Piggyback 500 300    Total Intake(mL/kg) 3076.7 (53) 2868.3 (49.4)    Urine (mL/kg/hr) 950 (0.7) 815 (0.6)    Emesis/NG output  0    Drains 850 710    Stool 0 0    Total Output 1800 1525    Net +1276.7 +1343.3          Urine Occurrence  1 x    Stool Occurrence 0 x 0 x    Emesis Occurrence  0 x          Physical Exam   Constitutional: He appears well-developed and well-nourished. No distress.   Cardiovascular: Normal rate and regular rhythm.   Pulmonary/Chest: Effort normal. No respiratory distress.   Abdominal: Soft. He exhibits no distension  (appropriate post op tenderness). There is tenderness. There is no rebound and no guarding.   Incision is clean, dry and intact  J-tube in place  NGT green       Significant Labs:  CBC:   Recent Labs   Lab 10/21/18  0352   WBC 9.42   RBC 3.62*   HGB 10.7*   HCT 34.6*      MCV 96   MCH 29.6   MCHC 30.9*     CMP:   Recent Labs   Lab 10/21/18  0352   GLU 73   CALCIUM 8.1*   ALBUMIN 2.4*   PROT 4.6*      K 3.9   CO2 28      BUN 14   CREATININE 0.6   ALKPHOS 63   ALT 29   AST 33   BILITOT 1.9*       Significant Diagnostics:  I have reviewed all pertinent imaging results/findings within the past 24 hours.    Assessment/Plan:     * Blind duodenal loop syndrome    36 yo male s/p duodenal resection with primary anastomosis    May dc ngt later  Start using jtube today  Ambulate  Awaiting bowel function         Chaya Singh MD  General Surgery  [facility]

## 2018-10-22 NOTE — PT/OT/SLP EVAL
Occupational Therapy   Evaluation    Name: Casper Fenton  MRN: [id number]  Admitting Diagnosis:  Blind duodenal loop syndrome 3 Days Post-Op    Recommendations:     Discharge Recommendations: rehabilitation facility  Discharge Equipment Recommendations:  walker, rolling  Barriers to discharge:  None    History:     Occupational Profile:  Living Environment: Pt lives with wife and 4 kids in a SSH with a threshold to enter. Pt has a walk in shower with a built in seat.   Previous level of function: I in all ADLs/ IADLs  Roles and Routines: , Father   Equipment Used at Home:  none(Have access to a RW from father in law)  Assistance upon Discharge: Assistance from spouse when needed upon D/C    Past Medical History:   Diagnosis Date    Allergy     Asthma     Intestinal atresia     Duodenal or proximal jejunal s/p duodeno-jejunal bypass?    Pancreas divisum     Short bowel syndrome        Past Surgical History:   Procedure Laterality Date    BIOPSY LIVER AND ULTRASOUND N/A 3/1/2016    Performed by [facility] Diagnostic Provider at [facility] OR 2ND [facility]    DILATATION Bilateral 8/27/2018    Per[facility]    EGD with push enteroscopy N/A 12/7/2015    [facility]    ESOPHAGOGASTRODUODENOSCOPY N/A 8/27/2018    Procedure: ESOPHAGOGASTRODUODENOSCOPY (EGD);  Surgeon: León Malhotra Jr., MD;  Location: [facility];  Service: Endoscopy;  Laterality: N/A;    ESOPHAGOGASTRODUODENOSCOPY (EGD) N/A 8/27/2018    [facility]    SMALL INTESTINE SURGERY      1985    wisdom tooth removal         Subjective     Chief Complaint: Pain only when coughing  Patient/Family Comments/goals: Pt agreeable to all goals set in therapy    Pain/Comfort:  · Pain Rating 1: (Pt did not rate; reported of pain only when coughing)  · Location - Side 1: Bilateral  · Location - Orientation 1: generalized  · Location 1: abdomen  · Pain Addressed  1: Reposition  · Pain Rating Post-Intervention 1: (pt did not rate)  · Location - Side 2: Bilateral  · Location - Orientation 2: generalized  · Location 2: abdomen    Patients cultural, spiritual, Scientologist conflicts given the current situation: none stated    Objective:     Communicated with: ZIGGY Eli prior to session.  Patient found HOB elevated all lines intact, call button in reach and RN notified and SCD, telemetry, oxygen(J tube) upon OT entry to room.    General Precautions: Standard, fall   Orthopedic Precautions:N/A   Braces: N/A     Occupational Performance:    Bed Mobility:    · Patient completed Rolling/Turning to Left with  stand by assistance  · Patient completed Rolling/Turning to Right with stand by assistance  · Patient completed Scooting/Bridging with stand by assistance  · Patient completed Supine to Sit with stand by assistance    Functional Mobility/Transfers:  · Patient completed Sit <> Stand Transfer with minimum assistance  with  no assistive device ; Pt returned to sitting and completed a 2nd sit- stand with RW requiring CGA for impaired balance and coordination.  · Functional Mobility: Pt ambulated to restroom with RW requiring RW for impaired balance, impaired gross motor coordination, and gait instability. No LOB noted.    Activities of Daily Living:  · Grooming: contact guard assistance to complete facial and hand hygiene in standing with RW  · Lower Body Dressing: stand by assistance to don LE undergarment in supine  · Toileting: contact guard assistance for wiping and clothing management in standing with RW    Cognitive/Visual Perceptual:  Cognitive/Psychosocial Skills:     -       Oriented to: Person, Place, Time and Situation   -       Follows Commands/attention:Follows multistep  commands  -       Communication: clear/fluent  -       Memory: No Deficits noted  -       Safety awareness/insight to disability: intact   -       Mood/Affect/Coping skills/emotional control: Appropriate  to situation  Visual/Perceptual:      -Intact per patient report    Physical Exam:  Balance:    -       Intact- sitting  Impaired- standing  Postural examination/scapula alignment:    -       No postural abnormalities identified  Skin integrity: Visible skin intact  Edema:  Mild LUE  Sensation:    -       Intact BUE LT  Upper Extremity Range of Motion:     -       Right Upper Extremity: WFL  -       Left Upper Extremity: WFL   Upper Extremity Strength:    -       Right Upper Extremity: WFL  3+  -       Left Upper Extremity: WFL  3+   Strength:    -       Right Upper Extremity: WFL  3+  -       Left Upper Extremity: WFL  3+  Fine Motor Coordination:    -       Intact    AMPAC 6 Click ADL:  AMPAC Total Score: 20    Treatment & Education:  Educated pt on the following:  - OT POC  - Role of OT  - Importance of sitting UIC as tolerated  - Self care independence  - Functional transfer safety  - Functional mobility safety  - Correct walker usage  - Bed mobility safety  Education:    Patient left up in chair with all lines intact, call button in reach and RN notified to report pt's progress and order RW for room    Assessment:     Casper Fenton is a 37 y.o. male with a medical diagnosis of Blind duodenal loop syndrome. Pt presents to OT with impaired balance and coordination affecting his functional mobility to complete self care tasks. Pt able to complete self care tasks with CGA in standing with RW due to impaired dynamic standing balance. Pt will benefit from a rehab facility to maximize his self care and functional ability to safely return home. While in house, pt will continue to benefit from skilled OT to increase self care and functional independence.  He presents with the following performance deficits affecting function: weakness, impaired endurance, impaired self care skills, impaired functional mobilty, gait instability, impaired cardiopulmonary response to activity, impaired balance, decreased lower  "extremity function.      Rehab Prognosis: Good; patient would benefit from acute skilled OT services to address these deficits and reach maximum level of function.         Clinical Decision Makin.  OT Low:  "Pt evaluation falls under low complexity for evaluation coding due to performance deficits noted in 1-3 areas as stated above and 0 co-morbities affecting current functional status. Data obtained from problem focused assessments. No modifications or assistance was required for completion of evaluation. Only brief occupational profile and history review completed."     Plan:     Patient to be seen 4 x/week to address the above listed problems via self-care/home management, therapeutic exercises, therapeutic activities  · Plan of Care Expires: 18  · Plan of Care Reviewed with: patient    This Plan of care has been discussed with the patient who was involved in its development and understands and is in agreement with the identified goals and treatment plan    GOALS:   Multidisciplinary Problems     Occupational Therapy Goals        Problem: Occupational Therapy Goal    Goal Priority Disciplines Outcome Interventions   Occupational Therapy Goal     OT, PT/OT Ongoing (interventions implemented as appropriate)    Description:  Goals to be met by: 10/29/18    Patient will increase functional independence with ADLs by performing:    Feeding with Set-up Assistance.  UE Dressing with Supervision.  LE Dressing with Supervision in sitting.  Grooming while standing at sink with Supervision with AD as needed.  Toileting from toilet with Supervision for hygiene and clothing management.   Step transfer with SBA with AD if needed to prepare for household tasks.  Toilet transfer to toilet with Supervision.  Upper extremity exercise program x15 reps per handout, with independence.                      Time Tracking:     OT Date of Treatment: 10/22/18  OT Start Time: 847  OT Stop Time: 914  OT Total Time (min): 27 " min    Billable Minutes:Evaluation 13  Self Care/Home Management 14    Portia Yi, OT  10/22/2018

## 2018-10-22 NOTE — PLAN OF CARE
Patient is a 37 year old male admitted from home 10/19/2018 and underwent Extensive THO, Resection of Duodenal Segment 3&4, Revision of Duodenojejunostomy, Cholecystectomy, J tube placement.  Patient is expected to discharge home with needs to be determined +/- 10/24/2018.    Patient lives in a one story home with his wife and 4 children.  Patient's mother and sister in room during visit.  Patient stated his wife will drive him home and obtain anything he needs after discharge.  Will continue to follow for needs.    PCP  Moose Dillard MD  [address] [address] / [facility] [address]  [phone]      [id number] - [place], [place] - [address] [address]  [address] [address] [address]  Phone: [phone] Fax: [phone]      Extended Emergency Contact Information  Primary Emergency Contact: Brittaney Fenton  Address: [address] [place] [facility]  Mobile Phone: [phone]  Relation: Spouse       10/22/18 1501   Discharge Assessment   Assessment Type Discharge Planning Assessment   Confirmed/corrected address and phone number on facesheet? Yes   Assessment information obtained from? Patient   Expected Length of Stay (days) 5   Communicated expected length of stay with patient/caregiver yes   Prior to hospitilization cognitive status: Alert/Oriented   Prior to hospitalization functional status: Independent   Current cognitive status: Alert/Oriented   Current Functional Status: Independent;Assistive Equipment   Lives With spouse;child(rachelle), dependent   Able to Return to Prior Arrangements yes   Is patient able to care for self after discharge? Yes   Who are your caregiver(s) and their phone number(s)? wife   Patient's perception of discharge disposition home or selfcare   Equipment Currently Used at Home none   Do you have any problems affording any of your prescribed medications? No   Is the patient taking medications as prescribed? yes   Does the patient have  transportation home? Yes   Transportation Available family or friend will provide   Discharge Plan A Home with family   Discharge Plan B Home with family;Home Health   Patient/Family In Agreement With Plan yes

## 2018-10-22 NOTE — PLAN OF CARE
Problem: Occupational Therapy Goal  Goal: Occupational Therapy Goal  Goals to be met by: 10/29/18    Patient will increase functional independence with ADLs by performing:    Feeding with Set-up Assistance.  UE Dressing with Supervision.  LE Dressing with Supervision in sitting.  Grooming while standing at sink with Supervision with AD as needed.  Toileting from toilet with Supervision for hygiene and clothing management.   Step transfer with SBA with AD if needed to prepare for household tasks.  Toilet transfer to toilet with Supervision.  Upper extremity exercise program x15 reps per handout, with independence.    Outcome: Ongoing (interventions implemented as appropriate)  OT POC Implemented     Comments: Portia Yi OTR/L

## 2018-10-22 NOTE — SUBJECTIVE & OBJECTIVE
Interval History:     No nausea, no bowel function.      Medications:  Continuous Infusions:   hydromorphone in 0.9 % NaCl 6 mg/30 ml      lactated ringers 125 mL/hr at 10/21/18 1729     Scheduled Meds:   enoxaparin  40 mg Subcutaneous Daily    metoprolol  5 mg Intravenous Q6H    pantoprazole  40 mg Intravenous Daily     PRN Meds:naloxone, ondansetron, promethazine (PHENERGAN) IVPB     Review of patient's allergies indicates:   Allergen Reactions    Kiwi (actinidia chinensis)      Objective:     Vital Signs (Most Recent):  Temp: 99.1 °F (37.3 °C) (10/22/18 0514)  Pulse: 86 (10/22/18 0527)  Resp: 16 (10/22/18 0514)  BP: 114/78 (10/22/18 0527)  SpO2: 99 % (10/22/18 0527) Vital Signs (24h Range):  Temp:  [97.5 °F (36.4 °C)-99.1 °F (37.3 °C)] 99.1 °F (37.3 °C)  Pulse:  [] 86  Resp:  [16-18] 16  SpO2:  [91 %-99 %] 99 %  BP: (102-122)/(63-84) 114/78     Weight: 58.1 kg (127 lb 16 oz)  Body mass index is 18.9 kg/m².    Intake/Output - Last 3 Shifts       10/20 0700 - 10/21 0659 10/21 0700 - 10/22 0659    P.O. 0 0    I.V. (mL/kg) 2516.7 (43.3) 2508.3 (43.2)    NG/GT 60 60    IV Piggyback 500 300    Total Intake(mL/kg) 3076.7 (53) 2868.3 (49.4)    Urine (mL/kg/hr) 950 (0.7) 815 (0.6)    Emesis/NG output  0    Drains 850 710    Stool 0 0    Total Output 1800 1525    Net +1276.7 +1343.3          Urine Occurrence  1 x    Stool Occurrence 0 x 0 x    Emesis Occurrence  0 x          Physical Exam   Constitutional: He appears well-developed and well-nourished. No distress.   Cardiovascular: Normal rate and regular rhythm.   Pulmonary/Chest: Effort normal. No respiratory distress.   Abdominal: Soft. He exhibits no distension (appropriate post op tenderness). There is tenderness. There is no rebound and no guarding.   Incision is clean, dry and intact  J-tube in place  NGT green       Significant Labs:  CBC:   Recent Labs   Lab 10/21/18  0352   WBC 9.42   RBC 3.62*   HGB 10.7*   HCT 34.6*      MCV 96   MCH 29.6    MCHC 30.9*     CMP:   Recent Labs   Lab 10/21/18  0352   GLU 73   CALCIUM 8.1*   ALBUMIN 2.4*   PROT 4.6*      K 3.9   CO2 28      BUN 14   CREATININE 0.6   ALKPHOS 63   ALT 29   AST 33   BILITOT 1.9*       Significant Diagnostics:  I have reviewed all pertinent imaging results/findings within the past 24 hours.

## 2018-10-23 LAB
ALBUMIN SERPL BCP-MCNC: 2.1 G/DL
ALP SERPL-CCNC: 80 U/L
ALT SERPL W/O P-5'-P-CCNC: 30 U/L
ANION GAP SERPL CALC-SCNC: 7 MMOL/L
AST SERPL-CCNC: 30 U/L
BASOPHILS # BLD AUTO: 0.02 K/UL
BASOPHILS NFR BLD: 0.2 %
BILIRUB SERPL-MCNC: 1 MG/DL
BLD PROD TYP BPU: NORMAL
BLD PROD TYP BPU: NORMAL
BLOOD UNIT EXPIRATION DATE: NORMAL
BLOOD UNIT EXPIRATION DATE: NORMAL
BLOOD UNIT TYPE CODE: 5100
BLOOD UNIT TYPE CODE: 5100
BLOOD UNIT TYPE: NORMAL
BLOOD UNIT TYPE: NORMAL
BUN SERPL-MCNC: 9 MG/DL
CALCIUM SERPL-MCNC: 8 MG/DL
CHLORIDE SERPL-SCNC: 103 MMOL/L
CO2 SERPL-SCNC: 26 MMOL/L
CODING SYSTEM: NORMAL
CODING SYSTEM: NORMAL
CREAT SERPL-MCNC: 0.6 MG/DL
DIFFERENTIAL METHOD: ABNORMAL
DISPENSE STATUS: NORMAL
DISPENSE STATUS: NORMAL
EOSINOPHIL # BLD AUTO: 0.3 K/UL
EOSINOPHIL NFR BLD: 3.8 %
ERYTHROCYTE [DISTWIDTH] IN BLOOD BY AUTOMATED COUNT: 12 %
EST. GFR  (AFRICAN AMERICAN): >60 ML/MIN/1.73 M^2
EST. GFR  (NON AFRICAN AMERICAN): >60 ML/MIN/1.73 M^2
GLUCOSE SERPL-MCNC: 122 MG/DL
HCT VFR BLD AUTO: 29.6 %
HGB BLD-MCNC: 9.5 G/DL
IMM GRANULOCYTES # BLD AUTO: 0.02 K/UL
IMM GRANULOCYTES NFR BLD AUTO: 0.2 %
LYMPHOCYTES # BLD AUTO: 0.8 K/UL
LYMPHOCYTES NFR BLD: 9.5 %
MAGNESIUM SERPL-MCNC: 1.8 MG/DL
MCH RBC QN AUTO: 29.8 PG
MCHC RBC AUTO-ENTMCNC: 32.1 G/DL
MCV RBC AUTO: 93 FL
MONOCYTES # BLD AUTO: 0.4 K/UL
MONOCYTES NFR BLD: 4.8 %
NEUTROPHILS # BLD AUTO: 7 K/UL
NEUTROPHILS NFR BLD: 81.5 %
NRBC BLD-RTO: 0 /100 WBC
PHOSPHATE SERPL-MCNC: 2.2 MG/DL
PLATELET # BLD AUTO: 197 K/UL
PMV BLD AUTO: 10.4 FL
POCT GLUCOSE: 104 MG/DL (ref 70–110)
POCT GLUCOSE: 110 MG/DL (ref 70–110)
POCT GLUCOSE: 126 MG/DL (ref 70–110)
POCT GLUCOSE: 128 MG/DL (ref 70–110)
POTASSIUM SERPL-SCNC: 3.2 MMOL/L
PROT SERPL-MCNC: 4.9 G/DL
RBC # BLD AUTO: 3.19 M/UL
SODIUM SERPL-SCNC: 136 MMOL/L
TRANS ERYTHROCYTES VOL PATIENT: NORMAL ML
TRANS ERYTHROCYTES VOL PATIENT: NORMAL ML
WBC # BLD AUTO: 8.61 K/UL

## 2018-10-23 PROCEDURE — 83735 ASSAY OF MAGNESIUM: CPT

## 2018-10-23 PROCEDURE — 84100 ASSAY OF PHOSPHORUS: CPT

## 2018-10-23 PROCEDURE — 25000003 PHARM REV CODE 250: Performed by: STUDENT IN AN ORGANIZED HEALTH CARE EDUCATION/TRAINING PROGRAM

## 2018-10-23 PROCEDURE — 94664 DEMO&/EVAL PT USE INHALER: CPT

## 2018-10-23 PROCEDURE — 27000646 HC AEROBIKA DEVICE

## 2018-10-23 PROCEDURE — 80053 COMPREHEN METABOLIC PANEL: CPT

## 2018-10-23 PROCEDURE — G8988 SELF CARE GOAL STATUS: HCPCS | Mod: CI

## 2018-10-23 PROCEDURE — 63600175 PHARM REV CODE 636 W HCPCS: Performed by: SURGERY

## 2018-10-23 PROCEDURE — 99900035 HC TECH TIME PER 15 MIN (STAT)

## 2018-10-23 PROCEDURE — 25000242 PHARM REV CODE 250 ALT 637 W/ HCPCS: Performed by: STUDENT IN AN ORGANIZED HEALTH CARE EDUCATION/TRAINING PROGRAM

## 2018-10-23 PROCEDURE — 97530 THERAPEUTIC ACTIVITIES: CPT

## 2018-10-23 PROCEDURE — 94799 UNLISTED PULMONARY SVC/PX: CPT

## 2018-10-23 PROCEDURE — 94761 N-INVAS EAR/PLS OXIMETRY MLT: CPT

## 2018-10-23 PROCEDURE — 20600001 HC STEP DOWN PRIVATE ROOM

## 2018-10-23 PROCEDURE — G8987 SELF CARE CURRENT STATUS: HCPCS | Mod: CJ

## 2018-10-23 PROCEDURE — 25000003 PHARM REV CODE 250: Performed by: SURGERY

## 2018-10-23 PROCEDURE — 94640 AIRWAY INHALATION TREATMENT: CPT

## 2018-10-23 PROCEDURE — 36415 COLL VENOUS BLD VENIPUNCTURE: CPT

## 2018-10-23 PROCEDURE — C9113 INJ PANTOPRAZOLE SODIUM, VIA: HCPCS | Performed by: SURGERY

## 2018-10-23 PROCEDURE — 85025 COMPLETE CBC W/AUTO DIFF WBC: CPT

## 2018-10-23 RX ORDER — POTASSIUM CHLORIDE 20 MEQ/15ML
60 SOLUTION ORAL ONCE
Status: COMPLETED | OUTPATIENT
Start: 2018-10-23 | End: 2018-10-23

## 2018-10-23 RX ORDER — IPRATROPIUM BROMIDE AND ALBUTEROL SULFATE 2.5; .5 MG/3ML; MG/3ML
3 SOLUTION RESPIRATORY (INHALATION) EVERY 4 HOURS
Status: DISCONTINUED | OUTPATIENT
Start: 2018-10-23 | End: 2018-10-25

## 2018-10-23 RX ADMIN — HYDROCODONE BITARTRATE AND ACETAMINOPHEN 15 ML: 7.5; 325 SOLUTION ORAL at 08:10

## 2018-10-23 RX ADMIN — Medication 12.5 MG: at 09:10

## 2018-10-23 RX ADMIN — IPRATROPIUM BROMIDE AND ALBUTEROL SULFATE 3 ML: .5; 3 SOLUTION RESPIRATORY (INHALATION) at 04:10

## 2018-10-23 RX ADMIN — IPRATROPIUM BROMIDE AND ALBUTEROL SULFATE 3 ML: .5; 3 SOLUTION RESPIRATORY (INHALATION) at 11:10

## 2018-10-23 RX ADMIN — POTASSIUM CHLORIDE 60 MEQ: 20 SOLUTION ORAL at 09:10

## 2018-10-23 RX ADMIN — Medication 12.5 MG: at 08:10

## 2018-10-23 RX ADMIN — HYDROCODONE BITARTRATE AND ACETAMINOPHEN 15 ML: 7.5; 325 SOLUTION ORAL at 12:10

## 2018-10-23 RX ADMIN — IPRATROPIUM BROMIDE AND ALBUTEROL SULFATE 3 ML: .5; 3 SOLUTION RESPIRATORY (INHALATION) at 07:10

## 2018-10-23 RX ADMIN — HYDROCODONE BITARTRATE AND ACETAMINOPHEN 15 ML: 7.5; 325 SOLUTION ORAL at 10:10

## 2018-10-23 RX ADMIN — HYDROCODONE BITARTRATE AND ACETAMINOPHEN 15 ML: 7.5; 325 SOLUTION ORAL at 04:10

## 2018-10-23 RX ADMIN — PANTOPRAZOLE SODIUM 40 MG: 40 INJECTION, POWDER, FOR SOLUTION INTRAVENOUS at 09:10

## 2018-10-23 RX ADMIN — ENOXAPARIN SODIUM 40 MG: 100 INJECTION SUBCUTANEOUS at 05:10

## 2018-10-23 NOTE — PLAN OF CARE
Problem: Patient Care Overview  Goal: Plan of Care Review  Outcome: Ongoing (interventions implemented as appropriate)  POC reviewed with pt, all questions and concerns addressed. VSS on RA, pt is AAOx4. NPO status maintained with sips of clear liquids. Pt voids per urinal and is up to toilet with standby assist, two BMs this shift. TF infusing through RLQ j-tube at 30mL/hr, 2p-8a, pt tolerating well. Pt ambulated entire length of hallway twice this shift, once with OT and once with RN. Pain managed with RN hydrocodone elixir. Accuchecks performed q6h. Pt resting with call light in reach, will continue to monitor.

## 2018-10-23 NOTE — PROGRESS NOTES
" [facility]  Adult Nutrition  Progress Note    SUMMARY       Recommendations    Recommendation/Intervention:     1. Recommend advancing TF of Isosource 1.5 as tolerated to goal rate of 65 mL/hr x 18 hrs (2p-8a) to provide 1755 kcal, 80 gm protein, and 893 mL water.   2. When medically able, ADAT to Clear Liquid and advance to Low Fat diet with texture per SLP.   3. RD following.     Goals: meet >85% EEN/EPN via TF   Nutrition Goal Status: new  Communication of RD Recs: (POC)    Reason for Assessment    Reason for Assessment: new tube feeding  Diagnosis: gastrointestinal disease(Blind duodenal loop syndrome)  Relevant Medical History: pancreas divisum, SBS, congenital intestinal atresia s/p bypass  Interdisciplinary Rounds: attended  General Information Comments: POD4 doudenal resection. Nocturnal TFs started today @ 20 mL/hr. Pt reports normal appetite PTA (3 meals/day). Reports 60 lbs wt loss in 6 years, but no recent wt loss. Per chart review, wt stable since 2017. Denies N/V/C. C/o D. NFPE completed - pt with mild-moderate muscle wasting of temples and clavicle. Pt reports clothes fit appropriately. Pt may have been malnourished at one time, but does not meet malnutrition criteria at this time. RD to monitor.   Nutrition Discharge Planning: adequate nutrition via TF vs po intake    Nutrition Risk Screen    Nutrition Risk Screen: no indicators present    Nutrition/Diet History    Do you have any cultural, spiritual, Episcopal conflicts, given your current situation?: none  Factors Affecting Nutritional Intake: NPO    Anthropometrics    Temp: 99.2 °F (37.3 °C)  Height Method: Stated  Height: 5' 9" (175.3 cm)  Height (inches): 69 in  Weight Method: Stated  Weight: 58.1 kg (127 lb 16 oz)  Weight (lb): 128 lb  Ideal Body Weight (IBW), Male: 160 lb  % Ideal Body Weight, Male (lb): 80 lb  BMI (Calculated): 18.9  BMI Grade: 18.5-24.9 - normal     Lab/Procedures/Meds    Pertinent Labs Reviewed: " reviewed  Pertinent Labs Comments: K 3.2, glucose 122, phos 2.2  Pertinent Medications Reviewed: reviewed  Pertinent Medications Comments: noted    Physical Findings/Assessment    Overall Physical Appearance: loss of muscle mass, other (see comments)(thin)  Tubes: jejunostomy tube  Skin: incision(s)    Estimated/Assessed Needs    Weight Used For Calorie Calculations: 58.1 kg (128 lb 1.4 oz)  Energy Calorie Requirements (kcal): 1876 kcal/day  Energy Need Method: [place]-St Jeor(x 1.25)  Protein Requirements: 58-75 kcal/day(1.0-1.3 kcal/kg)  Weight Used For Protein Calculations: 58.1 kg (128 lb 1.4 oz)  Fluid Requirements (mL): 1 mL/kcal or per MD  Fluid Need Method: other (see comments)(per MD)  RDA Method (mL): 1876     Nutrition Prescription Ordered    Current Diet Order: NPO  Current Nutrition Support Formula Ordered: Isosource 1.5  Current Nutrition Support Rate Ordered: 20 (ml)  Current Nutrition Support Frequency Ordered: mL/hr(2p-8a)    Evaluation of Received Nutrient/Fluid Intake    Enteral Calories (kcal): 540  Enteral Protein (gm): 25  Enteral (Free Water) Fluid (mL): 275  % Kcal Needs: 28%  % Protein Needs: 31%  I/O: +5.8L since admit  Energy Calories Required: not meeting needs  Protein Required: not meeting needs  Fluid Required: other (see comments)(per MD)  Tolerance: tolerating  % Intake of Estimated Energy Needs: 25 - 50 %  % Meal Intake: NPO    Nutrition Risk    Level of Risk/Frequency of Follow-up: high(f/u 2 x wk)     Assessment and Plan    Nutrition Problem  Altered GI Function    Related to (etiology):   Blind duodenal loop syndrome    Signs and Symptoms (as evidenced by):   S/p doudenal resection and need for TF to meet >85% EEN/EPN.     Nutrition Diagnosis Status:   New    Monitor and Evaluation    Food and Nutrient Intake: energy intake, enteral nutrition intake, food and beverage intake  Food and Nutrient Adminstration: enteral and parenteral nutrition administration  Anthropometric  Measurements: weight, weight change, body mass index  Biochemical Data, Medical Tests and Procedures: electrolyte and renal panel, gastrointestinal profile, glucose/endocrine profile, inflammatory profile, lipid profile  Nutrition-Focused Physical Findings: overall appearance     Nutrition Follow-Up    RD Follow-up?: Yes

## 2018-10-23 NOTE — PLAN OF CARE
Problem: Occupational Therapy Goal  Goal: Occupational Therapy Goal  Goals to be met by: 10/29/18    Patient will increase functional independence with ADLs by performing:    Feeding with Set-up Assistance.  UE Dressing with Supervision.  LE Dressing with Supervision in sitting.  Grooming while standing at sink with Supervision with AD as needed.  Toileting from toilet with Supervision for hygiene and clothing management.   Step transfer with SBA with AD if needed to prepare for household tasks. MET  Toilet transfer to toilet with Supervision.  Upper extremity exercise program x15 reps per handout, with independence.     Outcome: Ongoing (interventions implemented as appropriate)  Goal met for step t/f; pt progressing well toward remaining goals    Comments: Continue OT POC     Agnes Zhong OT  10/23/2018

## 2018-10-23 NOTE — PROGRESS NOTES
[facility]  General Surgery  Progress Note    Subjective:     History of Present Illness:  No notes on file    Post-Op Info:  Procedure(s) (LRB):  EXCISION, duodenal resection (N/A)  REVISION, jejunostomy (N/A)  CHOLECYSTECTOMY  CREATION, JEJUNOSTOMY   4 Days Post-Op     Interval History: NAEON. Getting out of bed and walking. Denies nausea, vomiting.    Medications:  Continuous Infusions:    Scheduled Meds:   enoxaparin  40 mg Subcutaneous Daily    metoprolol  12.5 mg Per J Tube BID    pantoprazole  40 mg Intravenous Daily     PRN Meds:dextrose 50%, glucagon (human recombinant), hydrocodone-apap 7.5-325 MG/15 ML, hydrocodone-apap 7.5-325 MG/15 ML, ondansetron, promethazine (PHENERGAN) IVPB     Review of patient's allergies indicates:   Allergen Reactions    Kiwi (actinidia chinensis)      Objective:     Vital Signs (Most Recent):  Temp: 99 °F (37.2 °C) (10/23/18 0346)  Pulse: 94 (10/23/18 0346)  Resp: 16 (10/23/18 0346)  BP: 119/86 (10/23/18 0346)  SpO2: 95 % (10/23/18 0346) Vital Signs (24h Range):  Temp:  [98.1 °F (36.7 °C)-100.4 °F (38 °C)] 99 °F (37.2 °C)  Pulse:  [85-98] 94  Resp:  [16-20] 16  SpO2:  [93 %-100 %] 95 %  BP: (108-133)/(68-91) 119/86     Weight: 58.1 kg (127 lb 16 oz)  Body mass index is 18.9 kg/m².    Intake/Output - Last 3 Shifts       10/21 0700 - 10/22 0659 10/22 0700 - 10/23 0659 10/23 0700 - 10/24 0659    P.O. 0 0     I.V. (mL/kg) 2508.3 (43.2) 250 (4.3)     NG/GT 60 520     IV Piggyback 300      Total Intake(mL/kg) 2868.3 (49.4) 770 (13.3)     Urine (mL/kg/hr) 815 (0.6) 530 (0.4)     Emesis/NG output 0 0     Drains 710 0     Stool 0 0     Total Output 1525 530     Net +1343.3 +240            Urine Occurrence 1 x 2 x     Stool Occurrence 0 x 0 x     Emesis Occurrence 0 x 0 x           Physical Exam   Constitutional: He is oriented to person, place, and time. He appears well-developed and well-nourished. No distress.   Cardiovascular: Normal rate and regular rhythm.    Pulmonary/Chest: Effort normal. No respiratory distress.   Abdominal: Soft. He exhibits no distension (appropriate post op tenderness). There is tenderness (appropriate for surgery).   Incision c/d/i. J-tube in place   Neurological: He is alert and oriented to person, place, and time.   Skin: Skin is dry.       Significant Labs:  CBC:   Recent Labs   Lab 10/23/18  0529   WBC 8.61   RBC 3.19*   HGB 9.5*   HCT 29.6*      MCV 93   MCH 29.8   MCHC 32.1     CMP:   Recent Labs   Lab 10/22/18  0544   GLU 33*   CALCIUM 8.2*   ALBUMIN 2.1*   PROT 4.7*      K 4.0   CO2 22*      BUN 13   CREATININE 0.5   ALKPHOS 68   ALT 32   AST 33   BILITOT 1.8*       Significant Diagnostics:  I have reviewed all pertinent imaging results/findings within the past 24 hours.    Assessment/Plan:     * Blind duodenal loop syndrome    36 yo male s/p duodenal resection with primary anastomosis    NPO may have ice chips/sips of clear liquid  Start TF today  Ambulate  Cont to await bowel function    Dispo: Still awaiting return of bowel function.  in past 24 hours. Starting tube feeds.         Yury Lockhart MD  General Surgery  [facility] [address]

## 2018-10-23 NOTE — SUBJECTIVE & OBJECTIVE
Interval History: NAEON. Getting out of bed and walking. Denies nausea, vomiting.    Medications:  Continuous Infusions:    Scheduled Meds:   enoxaparin  40 mg Subcutaneous Daily    metoprolol  12.5 mg Per J Tube BID    pantoprazole  40 mg Intravenous Daily     PRN Meds:dextrose 50%, glucagon (human recombinant), hydrocodone-apap 7.5-325 MG/15 ML, hydrocodone-apap 7.5-325 MG/15 ML, ondansetron, promethazine (PHENERGAN) IVPB     Review of patient's allergies indicates:   Allergen Reactions    Kiwi (actinidia chinensis)      Objective:     Vital Signs (Most Recent):  Temp: 99 °F (37.2 °C) (10/23/18 0346)  Pulse: 94 (10/23/18 0346)  Resp: 16 (10/23/18 0346)  BP: 119/86 (10/23/18 0346)  SpO2: 95 % (10/23/18 0346) Vital Signs (24h Range):  Temp:  [98.1 °F (36.7 °C)-100.4 °F (38 °C)] 99 °F (37.2 °C)  Pulse:  [85-98] 94  Resp:  [16-20] 16  SpO2:  [93 %-100 %] 95 %  BP: (108-133)/(68-91) 119/86     Weight: 58.1 kg (127 lb 16 oz)  Body mass index is 18.9 kg/m².    Intake/Output - Last 3 Shifts       10/21 0700 - 10/22 0659 10/22 0700 - 10/23 0659 10/23 0700 - 10/24 0659    P.O. 0 0     I.V. (mL/kg) 2508.3 (43.2) 250 (4.3)     NG/GT 60 520     IV Piggyback 300      Total Intake(mL/kg) 2868.3 (49.4) 770 (13.3)     Urine (mL/kg/hr) 815 (0.6) 530 (0.4)     Emesis/NG output 0 0     Drains 710 0     Stool 0 0     Total Output 1525 530     Net +1343.3 +240            Urine Occurrence 1 x 2 x     Stool Occurrence 0 x 0 x     Emesis Occurrence 0 x 0 x           Physical Exam   Constitutional: He is oriented to person, place, and time. He appears well-developed and well-nourished. No distress.   Cardiovascular: Normal rate and regular rhythm.   Pulmonary/Chest: Effort normal. No respiratory distress.   Abdominal: Soft. He exhibits no distension (appropriate post op tenderness). There is tenderness (appropriate for surgery).   Incision c/d/i. J-tube in place   Neurological: He is alert and oriented to person, place, and time.    Skin: Skin is dry.       Significant Labs:  CBC:   Recent Labs   Lab 10/23/18  0529   WBC 8.61   RBC 3.19*   HGB 9.5*   HCT 29.6*      MCV 93   MCH 29.8   MCHC 32.1     CMP:   Recent Labs   Lab 10/22/18  0544   GLU 33*   CALCIUM 8.2*   ALBUMIN 2.1*   PROT 4.7*      K 4.0   CO2 22*      BUN 13   CREATININE 0.5   ALKPHOS 68   ALT 32   AST 33   BILITOT 1.8*       Significant Diagnostics:  I have reviewed all pertinent imaging results/findings within the past 24 hours.

## 2018-10-23 NOTE — PT/OT/SLP PROGRESS
Occupational Therapy   Treatment    Name: Casper Fenton  MRN: [id number]  Admitting Diagnosis:  Blind duodenal loop syndrome  4 Days Post-Op    Recommendations:     Discharge Recommendations: rehabilitation facility  Discharge Equipment Recommendations:  walker, rolling  Barriers to discharge:  None    Subjective     Communicated with: RN prior to session.  Pain/Comfort:  · Pain Rating 1: 4/10  · Location - Side 1: Bilateral  · Location - Orientation 1: generalized  · Location 1: abdomen  · Pain Addressed 1: Reposition, Distraction, Cessation of Activity, Nurse notified  · Pain Rating Post-Intervention 1: 8/10    Patients cultural, spiritual, Roman Catholic conflicts given the current situation: none stated    Objective:     Patient found with: SCD, telemetry    General Precautions: Standard, fall   Orthopedic Precautions:N/A   Braces: N/A     Occupational Performance:    Bed Mobility:    · Patient completed Supine to Sit with stand by assistance     Functional Mobility/Transfers:  · Patient completed Sit <> Stand Transfer with stand by assistance  with  rolling walker   · Patient completed Bed <> Chair Transfer using Step Transfer technique with stand by assistance with rolling walker  · Functional Mobility: Pt ambulate 300 ft with SBA using RW; pt required multiple standing rest breaks    Activities of Daily Living:  · Pt decline ADLs this date    Patient left up in chair with all lines intact, call button in reach and RN notified    [facility] 6 Click:  [facility] Total Score: 20    Treatment & Education:  Pt educated on role of OT/POC  Pt educated on importance of ambulation/UIC  White board/communication board updated  Education:    Assessment:     Casper Fenton is a 37 y.o. male with a medical diagnosis of Blind duodenal loop syndrome.  He presents with low endurance limiting prolonged and safe ambulation.  Performance deficits affecting function are weakness, impaired endurance, impaired self care skills, impaired  functional mobilty, gait instability, pain.      Rehab Prognosis:  Good; patient would benefit from acute skilled OT services to address these deficits and reach maximum level of function.       Plan:     Patient to be seen 4 x/week to address the above listed problems via self-care/home management, therapeutic activities, therapeutic exercises  · Plan of Care Expires: 11/20/18  · Plan of Care Reviewed with: patient    This Plan of care has been discussed with the patient who was involved in its development and understands and is in agreement with the identified goals and treatment plan    GOALS:   Multidisciplinary Problems     Occupational Therapy Goals        Problem: Occupational Therapy Goal    Goal Priority Disciplines Outcome Interventions   Occupational Therapy Goal     OT, PT/OT Ongoing (interventions implemented as appropriate)    Description:  Goals to be met by: 10/29/18    Patient will increase functional independence with ADLs by performing:    Feeding with Set-up Assistance.  UE Dressing with Supervision.  LE Dressing with Supervision in sitting.  Grooming while standing at sink with Supervision with AD as needed.  Toileting from toilet with Supervision for hygiene and clothing management.   Step transfer with SBA with AD if needed to prepare for household tasks. MET  Toilet transfer to toilet with Supervision.  Upper extremity exercise program x15 reps per handout, with independence.                       Time Tracking:     OT Date of Treatment: 10/23/18  OT Start Time: 1000  OT Stop Time: 1015  OT Total Time (min): 15 min    Billable Minutes:Therapeutic Activity 15    Agnes Zhong OT  10/23/2018

## 2018-10-23 NOTE — PLAN OF CARE
Problem: Patient Care Overview  Goal: Plan of Care Review    Recommendations    Recommendation/Intervention:     1. Recommend advancing TF of Isosource 1.5 as tolerated to goal rate of 65 mL/hr x 18 hrs (2p-8a) to provide 1755 kcal, 80 gm protein, and 893 mL water.   2. When medically able, ADAT to Clear Liquid and advance to Low Fat diet with texture per SLP.   3. RD following.     Goals: meet >85% EEN/EPN via TF   Nutrition Goal Status: new

## 2018-10-23 NOTE — ASSESSMENT & PLAN NOTE
36 yo male s/p duodenal resection with primary anastomosis    NPO may have ice chips/sips of clear liquid  Start TF today  Ambulate  Cont to await bowel function    Dispo: Still awaiting return of bowel function.  in past 24 hours. Starting tube feeds.

## 2018-10-23 NOTE — PLAN OF CARE
Problem: Patient Care Overview  Goal: Plan of Care Review  Outcome: Ongoing (interventions implemented as appropriate)  Pt awake, alert, oriented X4. MDL abdominal incision with dermabond ZEENAT. RLQ J tube with tube feeding continued @ 20. No complaints of N/V. Pt complained of pain, treated with PRN medications. Pt passed gas but has not had BM yet. Voiding per urinal- dark. Vitals stable on RA. BG monitored q 6 hours. J tube flushed per orders. SCD in place. Plan of care reviewed with pt, who verbalizes understanding. No distress noted at this time. Will continue to monitor pt status.

## 2018-10-24 LAB
ALBUMIN SERPL BCP-MCNC: 2.4 G/DL
ALP SERPL-CCNC: 88 U/L
ALT SERPL W/O P-5'-P-CCNC: 32 U/L
ANION GAP SERPL CALC-SCNC: 7 MMOL/L
AST SERPL-CCNC: 24 U/L
BASOPHILS # BLD AUTO: 0.03 K/UL
BASOPHILS NFR BLD: 0.4 %
BILIRUB SERPL-MCNC: 0.9 MG/DL
BUN SERPL-MCNC: 7 MG/DL
CALCIUM SERPL-MCNC: 8.4 MG/DL
CHLORIDE SERPL-SCNC: 104 MMOL/L
CO2 SERPL-SCNC: 26 MMOL/L
CREAT SERPL-MCNC: 0.5 MG/DL
DIFFERENTIAL METHOD: ABNORMAL
EOSINOPHIL # BLD AUTO: 0.3 K/UL
EOSINOPHIL NFR BLD: 3.5 %
ERYTHROCYTE [DISTWIDTH] IN BLOOD BY AUTOMATED COUNT: 12.2 %
EST. GFR  (AFRICAN AMERICAN): >60 ML/MIN/1.73 M^2
EST. GFR  (NON AFRICAN AMERICAN): >60 ML/MIN/1.73 M^2
GLUCOSE SERPL-MCNC: 113 MG/DL
HCT VFR BLD AUTO: 30.7 %
HGB BLD-MCNC: 9.8 G/DL
IMM GRANULOCYTES # BLD AUTO: 0.02 K/UL
IMM GRANULOCYTES NFR BLD AUTO: 0.3 %
LYMPHOCYTES # BLD AUTO: 1.1 K/UL
LYMPHOCYTES NFR BLD: 14.2 %
MAGNESIUM SERPL-MCNC: 1.6 MG/DL
MCH RBC QN AUTO: 29.6 PG
MCHC RBC AUTO-ENTMCNC: 31.9 G/DL
MCV RBC AUTO: 93 FL
MONOCYTES # BLD AUTO: 0.5 K/UL
MONOCYTES NFR BLD: 7 %
NEUTROPHILS # BLD AUTO: 5.7 K/UL
NEUTROPHILS NFR BLD: 74.6 %
NRBC BLD-RTO: 0 /100 WBC
PHOSPHATE SERPL-MCNC: 2.8 MG/DL
PLATELET # BLD AUTO: 196 K/UL
PMV BLD AUTO: 10.4 FL
POCT GLUCOSE: 112 MG/DL (ref 70–110)
POCT GLUCOSE: 122 MG/DL (ref 70–110)
POCT GLUCOSE: 90 MG/DL (ref 70–110)
POCT GLUCOSE: 99 MG/DL (ref 70–110)
POTASSIUM SERPL-SCNC: 3.9 MMOL/L
PROT SERPL-MCNC: 5.4 G/DL
RBC # BLD AUTO: 3.31 M/UL
SODIUM SERPL-SCNC: 137 MMOL/L
WBC # BLD AUTO: 7.69 K/UL

## 2018-10-24 PROCEDURE — 83690 ASSAY OF LIPASE: CPT

## 2018-10-24 PROCEDURE — 94640 AIRWAY INHALATION TREATMENT: CPT

## 2018-10-24 PROCEDURE — 63600175 PHARM REV CODE 636 W HCPCS: Performed by: NURSE PRACTITIONER

## 2018-10-24 PROCEDURE — 25000003 PHARM REV CODE 250: Performed by: SURGERY

## 2018-10-24 PROCEDURE — C9113 INJ PANTOPRAZOLE SODIUM, VIA: HCPCS | Performed by: SURGERY

## 2018-10-24 PROCEDURE — 94761 N-INVAS EAR/PLS OXIMETRY MLT: CPT

## 2018-10-24 PROCEDURE — 94664 DEMO&/EVAL PT USE INHALER: CPT

## 2018-10-24 PROCEDURE — 25000242 PHARM REV CODE 250 ALT 637 W/ HCPCS: Performed by: STUDENT IN AN ORGANIZED HEALTH CARE EDUCATION/TRAINING PROGRAM

## 2018-10-24 PROCEDURE — 20600001 HC STEP DOWN PRIVATE ROOM

## 2018-10-24 PROCEDURE — 36415 COLL VENOUS BLD VENIPUNCTURE: CPT

## 2018-10-24 PROCEDURE — 63600175 PHARM REV CODE 636 W HCPCS: Performed by: SURGERY

## 2018-10-24 PROCEDURE — 85025 COMPLETE CBC W/AUTO DIFF WBC: CPT | Mod: 91

## 2018-10-24 PROCEDURE — 80053 COMPREHEN METABOLIC PANEL: CPT | Mod: 91

## 2018-10-24 PROCEDURE — 25000003 PHARM REV CODE 250: Performed by: NURSE PRACTITIONER

## 2018-10-24 PROCEDURE — 83735 ASSAY OF MAGNESIUM: CPT

## 2018-10-24 PROCEDURE — 99900035 HC TECH TIME PER 15 MIN (STAT)

## 2018-10-24 PROCEDURE — 84100 ASSAY OF PHOSPHORUS: CPT

## 2018-10-24 PROCEDURE — 80053 COMPREHEN METABOLIC PANEL: CPT

## 2018-10-24 RX ORDER — LANOLIN ALCOHOL/MO/W.PET/CERES
800 CREAM (GRAM) TOPICAL ONCE
Status: COMPLETED | OUTPATIENT
Start: 2018-10-24 | End: 2018-10-24

## 2018-10-24 RX ORDER — KETOROLAC TROMETHAMINE 15 MG/ML
15 INJECTION, SOLUTION INTRAMUSCULAR; INTRAVENOUS EVERY 8 HOURS
Status: COMPLETED | OUTPATIENT
Start: 2018-10-24 | End: 2018-10-24

## 2018-10-24 RX ORDER — SIMETHICONE 80 MG
1 TABLET,CHEWABLE ORAL ONCE
Status: COMPLETED | OUTPATIENT
Start: 2018-10-24 | End: 2018-10-24

## 2018-10-24 RX ORDER — SODIUM,POTASSIUM PHOSPHATES 280-250MG
2 POWDER IN PACKET (EA) ORAL ONCE
Status: COMPLETED | OUTPATIENT
Start: 2018-10-24 | End: 2018-10-24

## 2018-10-24 RX ORDER — SIMETHICONE 80 MG
1 TABLET,CHEWABLE ORAL 3 TIMES DAILY PRN
Status: DISCONTINUED | OUTPATIENT
Start: 2018-10-24 | End: 2018-10-27 | Stop reason: HOSPADM

## 2018-10-24 RX ORDER — HYDROMORPHONE HYDROCHLORIDE 1 MG/ML
1 INJECTION, SOLUTION INTRAMUSCULAR; INTRAVENOUS; SUBCUTANEOUS EVERY 4 HOURS PRN
Status: DISCONTINUED | OUTPATIENT
Start: 2018-10-24 | End: 2018-10-24

## 2018-10-24 RX ORDER — POTASSIUM CHLORIDE 750 MG/1
10 CAPSULE, EXTENDED RELEASE ORAL ONCE
Status: COMPLETED | OUTPATIENT
Start: 2018-10-24 | End: 2018-10-24

## 2018-10-24 RX ORDER — HYDROMORPHONE HYDROCHLORIDE 1 MG/ML
1 INJECTION, SOLUTION INTRAMUSCULAR; INTRAVENOUS; SUBCUTANEOUS EVERY 6 HOURS PRN
Status: DISCONTINUED | OUTPATIENT
Start: 2018-10-24 | End: 2018-10-25

## 2018-10-24 RX ADMIN — IPRATROPIUM BROMIDE AND ALBUTEROL SULFATE 3 ML: .5; 3 SOLUTION RESPIRATORY (INHALATION) at 07:10

## 2018-10-24 RX ADMIN — POTASSIUM CHLORIDE 10 MEQ: 750 CAPSULE, EXTENDED RELEASE ORAL at 11:10

## 2018-10-24 RX ADMIN — PANTOPRAZOLE SODIUM 40 MG: 40 INJECTION, POWDER, FOR SOLUTION INTRAVENOUS at 09:10

## 2018-10-24 RX ADMIN — IPRATROPIUM BROMIDE AND ALBUTEROL SULFATE 3 ML: .5; 3 SOLUTION RESPIRATORY (INHALATION) at 11:10

## 2018-10-24 RX ADMIN — SIMETHICONE CHEW TAB 80 MG 80 MG: 80 TABLET ORAL at 11:10

## 2018-10-24 RX ADMIN — HYDROCODONE BITARTRATE AND ACETAMINOPHEN 10 ML: 7.5; 325 SOLUTION ORAL at 05:10

## 2018-10-24 RX ADMIN — HYDROCODONE BITARTRATE AND ACETAMINOPHEN 15 ML: 7.5; 325 SOLUTION ORAL at 02:10

## 2018-10-24 RX ADMIN — IPRATROPIUM BROMIDE AND ALBUTEROL SULFATE 3 ML: .5; 3 SOLUTION RESPIRATORY (INHALATION) at 04:10

## 2018-10-24 RX ADMIN — KETOROLAC TROMETHAMINE 15 MG: 15 INJECTION, SOLUTION INTRAMUSCULAR; INTRAVENOUS at 09:10

## 2018-10-24 RX ADMIN — IPRATROPIUM BROMIDE AND ALBUTEROL SULFATE 3 ML: .5; 3 SOLUTION RESPIRATORY (INHALATION) at 08:10

## 2018-10-24 RX ADMIN — POTASSIUM & SODIUM PHOSPHATES POWDER PACK 280-160-250 MG 2 PACKET: 280-160-250 PACK at 11:10

## 2018-10-24 RX ADMIN — KETOROLAC TROMETHAMINE 15 MG: 15 INJECTION, SOLUTION INTRAMUSCULAR; INTRAVENOUS at 11:10

## 2018-10-24 RX ADMIN — MAGNESIUM OXIDE TAB 400 MG (241.3 MG ELEMENTAL MG) 800 MG: 400 (241.3 MG) TAB at 11:10

## 2018-10-24 RX ADMIN — HYDROCODONE BITARTRATE AND ACETAMINOPHEN 15 ML: 7.5; 325 SOLUTION ORAL at 10:10

## 2018-10-24 RX ADMIN — Medication 12.5 MG: at 09:10

## 2018-10-24 RX ADMIN — IPRATROPIUM BROMIDE AND ALBUTEROL SULFATE 3 ML: .5; 3 SOLUTION RESPIRATORY (INHALATION) at 12:10

## 2018-10-24 RX ADMIN — ENOXAPARIN SODIUM 40 MG: 100 INJECTION SUBCUTANEOUS at 05:10

## 2018-10-24 RX ADMIN — IPRATROPIUM BROMIDE AND ALBUTEROL SULFATE 3 ML: .5; 3 SOLUTION RESPIRATORY (INHALATION) at 03:10

## 2018-10-24 NOTE — ASSESSMENT & PLAN NOTE
36 yo male s/p duodenal resection with primary anastomosis    Regular diet  Continue tube feeds  Ambulate  DVT prophylaxis    Dispo: Likely home tomorrow

## 2018-10-24 NOTE — SUBJECTIVE & OBJECTIVE
Interval History: NAEON. Tolerated sips of clears yesterday. Ambulating and voiding ad tika.  Medications:  Continuous Infusions:    Scheduled Meds:   albuterol-ipratropium  3 mL Nebulization Q4H    enoxaparin  40 mg Subcutaneous Daily    metoprolol  12.5 mg Per J Tube BID    pantoprazole  40 mg Intravenous Daily     PRN Meds:dextrose 50%, glucagon (human recombinant), hydrocodone-apap 7.5-325 MG/15 ML, hydrocodone-apap 7.5-325 MG/15 ML, ondansetron, promethazine (PHENERGAN) IVPB     Review of patient's allergies indicates:   Allergen Reactions    Kiwi (actinidia chinensis)      Objective:     Vital Signs (Most Recent):  Temp: 96.5 °F (35.8 °C) (10/24/18 0736)  Pulse: 91 (10/24/18 0806)  Resp: 14 (10/24/18 0806)  BP: 123/83 (10/24/18 0736)  SpO2: 98 % (10/24/18 0806) Vital Signs (24h Range):  Temp:  [96.5 °F (35.8 °C)-99.2 °F (37.3 °C)] 96.5 °F (35.8 °C)  Pulse:  [] 91  Resp:  [12-20] 14  SpO2:  [96 %-99 %] 98 %  BP: (117-129)/(69-86) 123/83     Weight: 58.1 kg (127 lb 16 oz)  Body mass index is 18.9 kg/m².    Intake/Output - Last 3 Shifts       10/22 0700 - 10/23 0659 10/23 0700 - 10/24 0659 10/24 0700 - 10/25 0659    P.O. 0 800     I.V. (mL/kg) 250 (4.3) 20 (0.3)     NG/ 1253 144    IV Piggyback       Total Intake(mL/kg) 770 (13.3) 2073 (35.7) 144 (2.5)    Urine (mL/kg/hr) 530 (0.4) 1350 (1)     Emesis/NG output 0      Drains 0      Stool 0 0     Total Output 530 1350     Net +240 +723 +144           Urine Occurrence 2 x 2 x     Stool Occurrence 0 x 2 x     Emesis Occurrence 0 x            Physical Exam   Constitutional: He is oriented to person, place, and time. He appears well-developed and well-nourished. No distress.   Cardiovascular: Normal rate and regular rhythm.   Pulmonary/Chest: Effort normal. No respiratory distress.   Abdominal: Soft. He exhibits no distension (appropriate post op tenderness). There is tenderness (appropriate for surgery).   Incision c/d/i. J-tube in place    Neurological: He is alert and oriented to person, place, and time.   Skin: Skin is dry.       Significant Labs:  CBC:   Recent Labs   Lab 10/24/18  0430   WBC 7.69   RBC 3.31*   HGB 9.8*   HCT 30.7*      MCV 93   MCH 29.6   MCHC 31.9*     CMP:   Recent Labs   Lab 10/24/18  0430   *   CALCIUM 8.4*   ALBUMIN 2.4*   PROT 5.4*      K 3.9   CO2 26      BUN 7   CREATININE 0.5   ALKPHOS 88   ALT 32   AST 24   BILITOT 0.9       Significant Diagnostics:  I have reviewed all pertinent imaging results/findings within the past 24 hours.

## 2018-10-24 NOTE — PROGRESS NOTES
[facility]  General Surgery  Progress Note    Subjective:     History of Present Illness:  No notes on file    Post-Op Info:  Procedure(s) (LRB):  EXCISION, duodenal resection (N/A)  REVISION, jejunostomy (N/A)  CHOLECYSTECTOMY  CREATION, JEJUNOSTOMY   5 Days Post-Op     Interval History: NAEON. Tolerated sips of clears yesterday. Ambulating and voiding ad itka.  Medications:  Continuous Infusions:    Scheduled Meds:   albuterol-ipratropium  3 mL Nebulization Q4H    enoxaparin  40 mg Subcutaneous Daily    metoprolol  12.5 mg Per J Tube BID    pantoprazole  40 mg Intravenous Daily     PRN Meds:dextrose 50%, glucagon (human recombinant), hydrocodone-apap 7.5-325 MG/15 ML, hydrocodone-apap 7.5-325 MG/15 ML, ondansetron, promethazine (PHENERGAN) IVPB     Review of patient's allergies indicates:   Allergen Reactions    Kiwi (actinidia chinensis)      Objective:     Vital Signs (Most Recent):  Temp: 96.5 °F (35.8 °C) (10/24/18 0736)  Pulse: 91 (10/24/18 0806)  Resp: 14 (10/24/18 0806)  BP: 123/83 (10/24/18 0736)  SpO2: 98 % (10/24/18 0806) Vital Signs (24h Range):  Temp:  [96.5 °F (35.8 °C)-99.2 °F (37.3 °C)] 96.5 °F (35.8 °C)  Pulse:  [] 91  Resp:  [12-20] 14  SpO2:  [96 %-99 %] 98 %  BP: (117-129)/(69-86) 123/83     Weight: 58.1 kg (127 lb 16 oz)  Body mass index is 18.9 kg/m².    Intake/Output - Last 3 Shifts       10/22 0700 - 10/23 0659 10/23 0700 - 10/24 0659 10/24 0700 - 10/25 0659    P.O. 0 800     I.V. (mL/kg) 250 (4.3) 20 (0.3)     NG/ 1253 144    IV Piggyback       Total Intake(mL/kg) 770 (13.3) 2073 (35.7) 144 (2.5)    Urine (mL/kg/hr) 530 (0.4) 1350 (1)     Emesis/NG output 0      Drains 0      Stool 0 0     Total Output 530 1350     Net +240 +723 +144           Urine Occurrence 2 x 2 x     Stool Occurrence 0 x 2 x     Emesis Occurrence 0 x            Physical Exam   Constitutional: He is oriented to person, place, and time. He appears well-developed and well-nourished. No  distress.   Cardiovascular: Normal rate and regular rhythm.   Pulmonary/Chest: Effort normal. No respiratory distress.   Abdominal: Soft. He exhibits no distension (appropriate post op tenderness). There is tenderness (appropriate for surgery).   Incision c/d/i. J-tube in place   Neurological: He is alert and oriented to person, place, and time.   Skin: Skin is dry.       Significant Labs:  CBC:   Recent Labs   Lab 10/24/18  0430   WBC 7.69   RBC 3.31*   HGB 9.8*   HCT 30.7*      MCV 93   MCH 29.6   MCHC 31.9*     CMP:   Recent Labs   Lab 10/24/18  0430   *   CALCIUM 8.4*   ALBUMIN 2.4*   PROT 5.4*      K 3.9   CO2 26      BUN 7   CREATININE 0.5   ALKPHOS 88   ALT 32   AST 24   BILITOT 0.9       Significant Diagnostics:  I have reviewed all pertinent imaging results/findings within the past 24 hours.    Assessment/Plan:     * Blind duodenal loop syndrome    36 yo male s/p duodenal resection with primary anastomosis    Regular diet  Continue tube feeds  Ambulate  DVT prophylaxis    Dispo: Likely home tomorrow         Felicia Bethea MD  General Surgery  [facility]

## 2018-10-24 NOTE — PLAN OF CARE
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       I have seen and examined this patient face to face today. My clinical findings that support the need for the home health skilled services and home bound status are the following:  Weakness/numbness causing balance and gait disturbance due to Surgery making it taxing to leave home.    Allergies:  Review of patient's allergies indicates:   Allergen Reactions    Kiwi (actinidia chinensis)        Diet: regular diet    Activities: activity as tolerated    Nursing:   SN to complete comprehensive assessment including routine vital signs. Instruct on disease process and s/s of complications to report to MD. Review/verify medication list sent home with the patient at time of discharge  and instruct patient/caregiver as needed. Frequency may be adjusted depending on start of care date.    Notify MD if SBP > 160 or < 90; DBP > 90 or < 50; HR > 120 or < 50; Temp > 101      CONSULTS:    Physical Therapy to evaluate and treat. Evaluate for home safety and equipment needs; Establish/upgrade home exercise program. Perform / instruct on therapeutic exercises, gait training, transfer training, and Range of Motion.  Occupational Therapy to evaluate and treat. Evaluate home environment for safety and equipment needs.  Perform/Instruct on transfers, ADL training, ROM, and therapeutic exercises.   to evaluate for community resources/long-range planning.  Aide to provide assistance with personal care, ADLs, and vital signs.    Medications: Review discharge medications with patient and family and provide education.      Current Discharge Medication List      CONTINUE these medications which have NOT CHANGED    Details   aspirin (ECOTRIN) 81 MG EC tablet Take 1 tablet (81 mg total) by mouth once daily.  Refills: 0      B complx-C-folic-zinc-copper-E 500 mg-400 mcg- 24 mg-3 mg Tab Take 1 capsule by mouth every 12 (twelve) hours.  Qty: 60 tablet, Refills: 11      cholestyramine-aspartame (QUESTRAN LIGHT) 4 gram PwPk Take 1 packet (4 g total) by mouth every 8 (eight) hours.  Qty: 270 packet, Refills: 3      metoprolol succinate (TOPROL-XL) 50 MG 24 hr tablet Take 1 tablet (50 mg total) by mouth once daily.  Qty: 30 tablet, Refills: 11      multivitamin-min-iron-FA-vit K 18 mg iron-400 mcg-25 mcg Tab Take 1 tablet by mouth once daily.  Qty: 90 tablet, Refills: 0      pantoprazole (PROTONIX) 40 MG tablet Take 1 tablet (40 mg total) by mouth once daily.  Qty: 30 tablet, Refills: 1      sucralfate (CARAFATE) 1 gram tablet Take 1 tablet (1 g total) by mouth 4 (four) times daily.  Qty: 120 tablet, Refills: 0      thiamine 100 MG tablet Take 1 tablet (100 mg total) by mouth once daily.      acetaminophen (TYLENOL) 325 MG tablet Take 2 tablets (650 mg total) by mouth every 8 (eight) hours as needed for Temperature greater than (or equal to 101 degree F).  Refills: 0      testosterone cypionate (DEPOTESTOTERONE CYPIONATE) 200 mg/mL injection Inject 200 mg into the muscle every 14 (fourteen) days.             Disciplines requested: Nursing Services to provide:   Other: S/P revision of duodenojejunostomy; cholecystectomy;   Witzel jejunostomy 10/19/18.     Flush jejunostomy tube with 20cc water Q 8 hours   Free water flushes of 200cc  via jejunostomy tube TID while awake and free water infusion @ 30cc/hour while tube feeds are infusing.     Monitor abdomen for redness, oozing from staple site, abdominal distension, or pain not controlled by pain medication.     TUBE FEEDS via jejunostomy tube: Isosource 1.5 (or equivalent) @ 60cc/hour (10/26/2018) from 2p-8a.  Increase by 10cc every day at 2pm to a goal of 60cc/hour.  Vitals signs daily. Call MD with Temperature > 101, SBP > 180, HR < 50.   Physician to follow patient's care (the person listed here will be responsible for signing ongoing orders): Other: Dr. ADELA Terry, Dr. BABS Billingsley [phone] office [phone] fax Requested Start of Care Date: 10/24/2018    I certify that this patient is confined to his home and needs intermittent skilled nursing care, physical therapy and occupational therapy.          Electronically Signed  _________________________________  Rosmery Montez NP  10/26/2018

## 2018-10-24 NOTE — PLAN OF CARE
CALVIN learned from Surgery NP, Rosmery Gupta, that Pt was anticipated to discharge home tomorrow and would require home health and home tube feeds. Pt has Medicaid and Home Health can be hard to obtain. CALVIN has sent referrals via [employer] to three separate agencies. CALVIN also initiated home tube feed referral with Dol[facility] via [facility]. SW to continue to follow-up.    4pm Update: CALVIN has not received acceptance from any home health agency. CALVIN sent referral to [facility]. Chele Warren, has an appointment to teach Pt's wife tomorrow.     Mariah Parsons LCSW      10/24/18 1017   Post-Acute Status   Post-Acute Authorization Home Health/Hospice;Medications   Home Health/Hospice Status Referrals Sent   Medication Status Pending Prior Authorization

## 2018-10-24 NOTE — PLAN OF CARE
Problem: Patient Care Overview  Goal: Plan of Care Review  Outcome: Ongoing (interventions implemented as appropriate)  POC reviewed and understood by patient. Patient's AAOx4. Pain managed by prn pain meds per md order. Patient's IV remained patent and intact. Pt voided per urinal. Pt tolerated sips of fluids per md order. J tube remained intact. Patient tolerated TF without any c/o of n/v or abd discomfort. BS checked q6h. Patient's VSS. Call light WNR. Bed in lowest position. No acute events at this time. WCTM.

## 2018-10-24 NOTE — PLAN OF CARE
Problem: Patient Care Overview  Goal: Plan of Care Review  Outcome: Ongoing (interventions implemented as appropriate)  POC reviewed with pt, all questions and concerns addressed. VSS on RA, AAOx4. Diet advanced to regular, pt tolerating well with no complaints of NVD. TF infusing through RLQ j-tube at 40mL/hr, 2p-8a. Pt is up independently in room and voids per urinal with marginally adequate UOP. Pt ambulated hallway once today with RN. Accuchecks performed q6, no coverage needed. J-tube flushed per order. Pain managed with PRN hydrocodone elixir. No adverse events. Pt resting with call light in reach, will continue to monitor.

## 2018-10-25 ENCOUNTER — TELEPHONE (OUTPATIENT)
Dept: SURGERY | Facility: CLINIC | Age: 38
End: 2018-10-25

## 2018-10-25 LAB
ALBUMIN SERPL BCP-MCNC: 2.2 G/DL
ALBUMIN SERPL BCP-MCNC: 2.7 G/DL
ALP SERPL-CCNC: 189 U/L
ALP SERPL-CCNC: 95 U/L
ALT SERPL W/O P-5'-P-CCNC: 41 U/L
ALT SERPL W/O P-5'-P-CCNC: 42 U/L
ANION GAP SERPL CALC-SCNC: 5 MMOL/L
ANION GAP SERPL CALC-SCNC: 9 MMOL/L
ANISOCYTOSIS BLD QL SMEAR: SLIGHT
AST SERPL-CCNC: 29 U/L
AST SERPL-CCNC: 40 U/L
BASOPHILS # BLD AUTO: 0.03 K/UL
BASOPHILS # BLD AUTO: 0.05 K/UL
BASOPHILS NFR BLD: 0.5 %
BASOPHILS NFR BLD: 0.7 %
BILIRUB SERPL-MCNC: 0.6 MG/DL
BILIRUB SERPL-MCNC: 0.7 MG/DL
BUN SERPL-MCNC: 6 MG/DL
BUN SERPL-MCNC: 6 MG/DL
CALCIUM SERPL-MCNC: 8.6 MG/DL
CALCIUM SERPL-MCNC: 9.2 MG/DL
CHLORIDE SERPL-SCNC: 104 MMOL/L
CHLORIDE SERPL-SCNC: 106 MMOL/L
CO2 SERPL-SCNC: 25 MMOL/L
CO2 SERPL-SCNC: 28 MMOL/L
CREAT SERPL-MCNC: 0.5 MG/DL
CREAT SERPL-MCNC: 0.6 MG/DL
DIFFERENTIAL METHOD: ABNORMAL
DIFFERENTIAL METHOD: ABNORMAL
EOSINOPHIL # BLD AUTO: 0.6 K/UL
EOSINOPHIL # BLD AUTO: 0.6 K/UL
EOSINOPHIL NFR BLD: 10 %
EOSINOPHIL NFR BLD: 9.4 %
ERYTHROCYTE [DISTWIDTH] IN BLOOD BY AUTOMATED COUNT: 12.3 %
ERYTHROCYTE [DISTWIDTH] IN BLOOD BY AUTOMATED COUNT: 12.5 %
EST. GFR  (AFRICAN AMERICAN): >60 ML/MIN/1.73 M^2
EST. GFR  (AFRICAN AMERICAN): >60 ML/MIN/1.73 M^2
EST. GFR  (NON AFRICAN AMERICAN): >60 ML/MIN/1.73 M^2
EST. GFR  (NON AFRICAN AMERICAN): >60 ML/MIN/1.73 M^2
GLUCOSE SERPL-MCNC: 87 MG/DL
GLUCOSE SERPL-MCNC: 94 MG/DL
HCT VFR BLD AUTO: 30.8 %
HCT VFR BLD AUTO: 34.5 %
HGB BLD-MCNC: 10.6 G/DL
HGB BLD-MCNC: 9.6 G/DL
HYPOCHROMIA BLD QL SMEAR: ABNORMAL
IMM GRANULOCYTES # BLD AUTO: 0.01 K/UL
IMM GRANULOCYTES # BLD AUTO: 0.02 K/UL
IMM GRANULOCYTES NFR BLD AUTO: 0.1 %
IMM GRANULOCYTES NFR BLD AUTO: 0.3 %
LIPASE SERPL-CCNC: 73 U/L
LYMPHOCYTES # BLD AUTO: 0.9 K/UL
LYMPHOCYTES # BLD AUTO: 1 K/UL
LYMPHOCYTES NFR BLD: 14.3 %
LYMPHOCYTES NFR BLD: 14.7 %
MAGNESIUM SERPL-MCNC: 1.7 MG/DL
MCH RBC QN AUTO: 29 PG
MCH RBC QN AUTO: 29.4 PG
MCHC RBC AUTO-ENTMCNC: 30.7 G/DL
MCHC RBC AUTO-ENTMCNC: 31.2 G/DL
MCV RBC AUTO: 94 FL
MCV RBC AUTO: 94 FL
MONOCYTES # BLD AUTO: 0.4 K/UL
MONOCYTES # BLD AUTO: 0.5 K/UL
MONOCYTES NFR BLD: 7.1 %
MONOCYTES NFR BLD: 7.5 %
NEUTROPHILS # BLD AUTO: 3.9 K/UL
NEUTROPHILS # BLD AUTO: 4.6 K/UL
NEUTROPHILS NFR BLD: 67.4 %
NEUTROPHILS NFR BLD: 68 %
NRBC BLD-RTO: 0 /100 WBC
NRBC BLD-RTO: 0 /100 WBC
PHOSPHATE SERPL-MCNC: 4.3 MG/DL
PLATELET # BLD AUTO: 222 K/UL
PLATELET # BLD AUTO: 222 K/UL
PLATELET BLD QL SMEAR: ABNORMAL
PMV BLD AUTO: 10.4 FL
PMV BLD AUTO: 10.8 FL
POCT GLUCOSE: 102 MG/DL (ref 70–110)
POCT GLUCOSE: 120 MG/DL (ref 70–110)
POCT GLUCOSE: 98 MG/DL (ref 70–110)
POLYCHROMASIA BLD QL SMEAR: ABNORMAL
POTASSIUM SERPL-SCNC: 4.3 MMOL/L
POTASSIUM SERPL-SCNC: 4.8 MMOL/L
PROT SERPL-MCNC: 5.1 G/DL
PROT SERPL-MCNC: 6 G/DL
RBC # BLD AUTO: 3.27 M/UL
RBC # BLD AUTO: 3.66 M/UL
SODIUM SERPL-SCNC: 138 MMOL/L
SODIUM SERPL-SCNC: 139 MMOL/L
WBC # BLD AUTO: 5.79 K/UL
WBC # BLD AUTO: 6.79 K/UL

## 2018-10-25 PROCEDURE — 97530 THERAPEUTIC ACTIVITIES: CPT

## 2018-10-25 PROCEDURE — 36415 COLL VENOUS BLD VENIPUNCTURE: CPT

## 2018-10-25 PROCEDURE — G8989 SELF CARE D/C STATUS: HCPCS | Mod: CH

## 2018-10-25 PROCEDURE — G8987 SELF CARE CURRENT STATUS: HCPCS | Mod: CH

## 2018-10-25 PROCEDURE — G8988 SELF CARE GOAL STATUS: HCPCS | Mod: CH

## 2018-10-25 PROCEDURE — 25000003 PHARM REV CODE 250: Performed by: SURGERY

## 2018-10-25 PROCEDURE — 63600175 PHARM REV CODE 636 W HCPCS: Performed by: SURGERY

## 2018-10-25 PROCEDURE — 25000242 PHARM REV CODE 250 ALT 637 W/ HCPCS: Performed by: STUDENT IN AN ORGANIZED HEALTH CARE EDUCATION/TRAINING PROGRAM

## 2018-10-25 PROCEDURE — 20600001 HC STEP DOWN PRIVATE ROOM

## 2018-10-25 PROCEDURE — 63600175 PHARM REV CODE 636 W HCPCS: Performed by: STUDENT IN AN ORGANIZED HEALTH CARE EDUCATION/TRAINING PROGRAM

## 2018-10-25 PROCEDURE — 84100 ASSAY OF PHOSPHORUS: CPT

## 2018-10-25 PROCEDURE — 85025 COMPLETE CBC W/AUTO DIFF WBC: CPT

## 2018-10-25 PROCEDURE — 94640 AIRWAY INHALATION TREATMENT: CPT

## 2018-10-25 PROCEDURE — 94761 N-INVAS EAR/PLS OXIMETRY MLT: CPT

## 2018-10-25 PROCEDURE — 83735 ASSAY OF MAGNESIUM: CPT

## 2018-10-25 PROCEDURE — 97116 GAIT TRAINING THERAPY: CPT

## 2018-10-25 PROCEDURE — 94664 DEMO&/EVAL PT USE INHALER: CPT

## 2018-10-25 PROCEDURE — 80053 COMPREHEN METABOLIC PANEL: CPT

## 2018-10-25 PROCEDURE — 25000003 PHARM REV CODE 250: Performed by: STUDENT IN AN ORGANIZED HEALTH CARE EDUCATION/TRAINING PROGRAM

## 2018-10-25 PROCEDURE — 99900035 HC TECH TIME PER 15 MIN (STAT)

## 2018-10-25 RX ORDER — ASPIRIN 81 MG/1
81 TABLET ORAL DAILY
Status: DISCONTINUED | OUTPATIENT
Start: 2018-10-25 | End: 2018-10-27 | Stop reason: HOSPADM

## 2018-10-25 RX ORDER — POLYETHYLENE GLYCOL 3350 17 G/17G
17 POWDER, FOR SOLUTION ORAL DAILY
Status: DISCONTINUED | OUTPATIENT
Start: 2018-10-25 | End: 2018-10-27 | Stop reason: HOSPADM

## 2018-10-25 RX ORDER — KETOROLAC TROMETHAMINE 30 MG/ML
30 INJECTION, SOLUTION INTRAMUSCULAR; INTRAVENOUS EVERY 6 HOURS
Status: COMPLETED | OUTPATIENT
Start: 2018-10-25 | End: 2018-10-26

## 2018-10-25 RX ORDER — METOPROLOL SUCCINATE 50 MG/1
50 TABLET, EXTENDED RELEASE ORAL DAILY
Status: DISCONTINUED | OUTPATIENT
Start: 2018-10-25 | End: 2018-10-27 | Stop reason: HOSPADM

## 2018-10-25 RX ORDER — OXYCODONE AND ACETAMINOPHEN 5; 325 MG/1; MG/1
1 TABLET ORAL EVERY 4 HOURS PRN
Status: DISCONTINUED | OUTPATIENT
Start: 2018-10-25 | End: 2018-10-27 | Stop reason: HOSPADM

## 2018-10-25 RX ORDER — THIAMINE HCL 100 MG
100 TABLET ORAL DAILY
Status: DISCONTINUED | OUTPATIENT
Start: 2018-10-25 | End: 2018-10-27 | Stop reason: HOSPADM

## 2018-10-25 RX ORDER — LANOLIN ALCOHOL/MO/W.PET/CERES
800 CREAM (GRAM) TOPICAL ONCE
Status: COMPLETED | OUTPATIENT
Start: 2018-10-25 | End: 2018-10-25

## 2018-10-25 RX ORDER — BISACODYL 5 MG
5 TABLET, DELAYED RELEASE (ENTERIC COATED) ORAL DAILY PRN
Status: DISCONTINUED | OUTPATIENT
Start: 2018-10-25 | End: 2018-10-27 | Stop reason: HOSPADM

## 2018-10-25 RX ORDER — SIMETHICONE 80 MG
1 TABLET,CHEWABLE ORAL 3 TIMES DAILY PRN
Status: DISCONTINUED | OUTPATIENT
Start: 2018-10-25 | End: 2018-10-25 | Stop reason: SDUPTHER

## 2018-10-25 RX ORDER — SUCRALFATE 1 G/10ML
1 SUSPENSION ORAL
Status: DISCONTINUED | OUTPATIENT
Start: 2018-10-25 | End: 2018-10-27 | Stop reason: HOSPADM

## 2018-10-25 RX ORDER — OXYCODONE HYDROCHLORIDE 10 MG/1
10 TABLET ORAL EVERY 4 HOURS PRN
Status: DISCONTINUED | OUTPATIENT
Start: 2018-10-25 | End: 2018-10-27 | Stop reason: HOSPADM

## 2018-10-25 RX ORDER — PANTOPRAZOLE SODIUM 40 MG/1
40 TABLET, DELAYED RELEASE ORAL DAILY
Status: DISCONTINUED | OUTPATIENT
Start: 2018-10-25 | End: 2018-10-27 | Stop reason: HOSPADM

## 2018-10-25 RX ADMIN — MAGNESIUM OXIDE TAB 400 MG (241.3 MG ELEMENTAL MG) 800 MG: 400 (241.3 MG) TAB at 09:10

## 2018-10-25 RX ADMIN — KETOROLAC TROMETHAMINE 30 MG: 30 INJECTION, SOLUTION INTRAMUSCULAR at 05:10

## 2018-10-25 RX ADMIN — KETOROLAC TROMETHAMINE 30 MG: 30 INJECTION, SOLUTION INTRAMUSCULAR at 09:10

## 2018-10-25 RX ADMIN — HYDROMORPHONE HYDROCHLORIDE 1 MG: 1 INJECTION, SOLUTION INTRAMUSCULAR; INTRAVENOUS; SUBCUTANEOUS at 12:10

## 2018-10-25 RX ADMIN — SUCRALFATE 1 G: 1 SUSPENSION ORAL at 11:10

## 2018-10-25 RX ADMIN — KETOROLAC TROMETHAMINE 30 MG: 30 INJECTION, SOLUTION INTRAMUSCULAR at 11:10

## 2018-10-25 RX ADMIN — ONDANSETRON 4 MG: 2 INJECTION INTRAMUSCULAR; INTRAVENOUS at 02:10

## 2018-10-25 RX ADMIN — ASPIRIN 81 MG: 81 TABLET, COATED ORAL at 09:10

## 2018-10-25 RX ADMIN — IPRATROPIUM BROMIDE AND ALBUTEROL SULFATE 3 ML: .5; 3 SOLUTION RESPIRATORY (INHALATION) at 03:10

## 2018-10-25 RX ADMIN — METOPROLOL SUCCINATE 50 MG: 50 TABLET, EXTENDED RELEASE ORAL at 09:10

## 2018-10-25 RX ADMIN — POLYETHYLENE GLYCOL 3350 17 G: 17 POWDER, FOR SOLUTION ORAL at 09:10

## 2018-10-25 RX ADMIN — SUCRALFATE 1 G: 1 SUSPENSION ORAL at 09:10

## 2018-10-25 RX ADMIN — MULTIPLE VITAMINS W/ MINERALS TAB 1 TABLET: TAB at 11:10

## 2018-10-25 RX ADMIN — ENOXAPARIN SODIUM 40 MG: 100 INJECTION SUBCUTANEOUS at 05:10

## 2018-10-25 RX ADMIN — Medication 100 MG: at 09:10

## 2018-10-25 RX ADMIN — SUCRALFATE 1 G: 1 SUSPENSION ORAL at 05:10

## 2018-10-25 RX ADMIN — PANTOPRAZOLE SODIUM 40 MG: 40 TABLET, DELAYED RELEASE ORAL at 09:10

## 2018-10-25 NOTE — PT/OT/SLP PROGRESS
Physical Therapy Treatment    Patient Name:  Casper Fenton   MRN:  [id number]    Recommendations:     Discharge Recommendations:  outpatient PT   Discharge Equipment Recommendations: none   Barriers to discharge: None    Assessment:     Casper Fenton is a 37 y.o. male admitted with a medical diagnosis of Blind duodenal loop syndrome.  He presents with the following impairments/functional limitations:  weakness, impaired endurance, impaired functional mobilty, gait instability, impaired balance, pain Pt. cooperative and tolerated treatment well. Pt. progressing with mobility.    Rehab Prognosis:  good; patient would benefit from acute skilled PT services to address these deficits and reach maximum level of function.      Recent Surgery: Procedure(s) (LRB):  EXCISION, duodenal resection (N/A)  REVISION, jejunostomy (N/A)  CHOLECYSTECTOMY  CREATION, JEJUNOSTOMY 6 Days Post-Op    Plan:     During this hospitalization, patient to be seen 5 x/week to address the above listed problems via gait training, therapeutic activities, therapeutic exercises  · Plan of Care Expires:  11/21/18   Plan of Care Reviewed with: patient    Subjective     Communicated with nursing prior to session.  Patient found supine upon PT entry to room, agreeable to treatment.      Chief Complaint: none stated  Patient comments/goals: to go home  Pain/Comfort:  · Pain Rating 1: (pt. did not rate)    Patients cultural, spiritual, Moravian conflicts given the current situation: no    Objective:     Patient found with: telemetry, peripheral IV     General Precautions: Standard, fall   Orthopedic Precautions:N/A   Braces:       Functional Mobility:  · Bed Mobility:     · Rolling Right: supervision  · Scooting: supervision  · Supine to Sit: supervision  · Sit to Supine: supervision  · Transfers:     · Sit to Stand:  supervision with rolling walker  · Gait: 400' with RW and SBA  · Balance: fair      AM-PAC 6 CLICK MOBILITY  Turning over in bed  (including adjusting bedclothes, sheets and blankets)?: 4  Sitting down on and standing up from a chair with arms (e.g., wheelchair, bedside commode, etc.): 3  Moving from lying on back to sitting on the side of the bed?: 4  Moving to and from a bed to a chair (including a wheelchair)?: 3  Need to walk in hospital room?: 3  Climbing 3-5 steps with a railing?: 3  Basic Mobility Total Score: 20       Therapeutic Activities and Exercises:   Discussed pt.'s progress, goals, therapy/DME needs, and POC.    Patient left supine with all lines intact and call button in reach..    GOALS:   Multidisciplinary Problems     Physical Therapy Goals        Problem: Physical Therapy Goal    Goal Priority Disciplines Outcome Goal Variances Interventions   Physical Therapy Goal     PT, PT/OT Ongoing (interventions implemented as appropriate)     Description:  Goals to be met by: Oct. 29     Patient will increase functional independence with mobility by performin. Sit to stand transfer with Modified [place]  2. Bed to chair transfer with Modified [place] using Rolling Walker  3. Gait  x 200 feet with Contact guard assistance using Rolling Walker. - met        Revised: Gait  x 300 feet with Supervision using Rolling Walker. - not met  4. Stand for 2 minutes with Stand-by Assistance while performing an activity/standing exercises with no UE support.  5. Pt will be able to stand with feet together, eyes open for 1 minute with SBA and no UE support.                         Time Tracking:     PT Received On: 10/25/18  PT Start Time: 1056     PT Stop Time: 1119  PT Total Time (min): 23 min     Billable Minutes: Gait Training 15 and Therapeutic Activity 8    Treatment Type: Treatment  PT/PTA: PT     PTA Visit Number: 0     Erik Lazo, PT  10/25/2018

## 2018-10-25 NOTE — PT/OT/SLP PROGRESS
Occupational Therapy   Treatment & Discharge    Name: Casper Fenton  MRN: [id number]  Admitting Diagnosis:  Blind duodenal loop syndrome  6 Days Post-Op    Recommendations:     Discharge Recommendations: home health OT  Discharge Equipment Recommendations:  none  Barriers to discharge:  None    Subjective     Communicated with: RN prior to session.  Pain/Comfort:  · Pain Rating 1: 0/10  · Pain Addressed 1: Reposition, Distraction, Cessation of Activity  · Pain Rating Post-Intervention 1: 0/10    Patients cultural, spiritual, Synagogue conflicts given the current situation: none stated    Objective:     Patient found with: (no lines connected)    General Precautions: Standard, fall   Orthopedic Precautions:N/A   Braces: N/A     Occupational Performance:    Bed Mobility:    · Patient completed Supine to Sit with modified independence  · Patient completed Sit to Supine with modified independence     Functional Mobility/Transfers:  · Patient completed Sit <> Stand Transfer with supervision  with  no assistive device   · Patient completed Toilet Transfer Step Transfer technique with supervision with  no AD  · Functional Mobility: Pt ambulate bed<>bathroom with supervision using no AD    Activities of Daily Living:  · Lower Body Dressing: independence to doff/don B socks while seated EOB  · Toileting: supervision for clothing management during practice toilet t/f    Patient left HOB elevated with all lines intact and call button in reach    [facility] 6 Click:  [facility] Total Score: 24    Treatment & Education:  Pt educated on role of OT/POC  Pt educated on importance of ambulation/UIC  Pt educated on and in agreement with d/c from OT and HHOT rec for home safety evaluation  White board/communication board updated  Education:    Assessment:     Casper Fenton is a 37 y.o. male with a medical diagnosis of Blind duodenal loop syndrome.  He presents with functioning at [facility]/mod I for mobility and ADLs.  Performance  deficits affecting function are gait instability, impaired endurance.      Rehab Prognosis:  Good; patient would no longer benefit from acute skilled OT services      Plan:     Patient to be seen 4 x/week to address the above listed problems via self-care/home management, therapeutic activities, therapeutic exercises  · Plan of Care Expires: 11/20/18  · Plan of Care Reviewed with: patient    This Plan of care has been discussed with the patient who was involved in its development and understands and is in agreement with the identified goals and treatment plan    GOALS:   Multidisciplinary Problems     Occupational Therapy Goals     Not on file          Multidisciplinary Problems (Resolved)        Problem: Occupational Therapy Goal    Goal Priority Disciplines Outcome Interventions   Occupational Therapy Goal   (Resolved)     OT, PT/OT Outcome(s) achieved    Description:  Goals to be met by: 10/29/18    Patient will increase functional independence with ADLs by performing:    Feeding with Set-up Assistance. MET  UE Dressing with Supervision. MET  LE Dressing with Supervision in sitting. MET  Grooming while standing at sink with Supervision with AD as needed. MET  Toileting from toilet with Supervision for hygiene and clothing management. MET  Step transfer with SBA with AD if needed to prepare for household tasks. MET  Toilet transfer to toilet with Supervision. MET  Upper extremity exercise program x15 reps per handout, with independence. MET                        Time Tracking:     OT Date of Treatment: 10/25/18  OT Start Time: 1321  OT Stop Time: 1331  OT Total Time (min): 10 min    Billable Minutes:Therapeutic Activity 10    Agnes Zhong OT  10/25/2018

## 2018-10-25 NOTE — PROGRESS NOTES
Notified MD on call for surgery about patient's abd distention and increased pain around j tube site. Patient c/o abd fullness. MD said he would come see patient and to hold TF. WCTM.

## 2018-10-25 NOTE — PLAN OF CARE
Problem: Physical Therapy Goal  Goal: Physical Therapy Goal  Goals to be met by: Oct. 29     Patient will increase functional independence with mobility by performin. Sit to stand transfer with Modified [place]  2. Bed to chair transfer with Modified [place] using Rolling Walker  3. Gait  x 200 feet with Contact guard assistance using Rolling Walker. - met        Revised: Gait  x 300 feet with Supervision using Rolling Walker. - not met  4. Stand for 2 minutes with Stand-by Assistance while performing an activity/standing exercises with no UE support.  5. Pt will be able to stand with feet together, eyes open for 1 minute with SBA and no UE support.       Outcome: Ongoing (interventions implemented as appropriate)  Gait goal met and revised

## 2018-10-25 NOTE — ASSESSMENT & PLAN NOTE
38 yo male s/p duodenal resection with primary anastomosis    Regular diet  Continue tube feeds  Ambulate  DVT prophylaxis    Dispo: Cont ambulating in halls. Will monitor bowel function today.

## 2018-10-25 NOTE — PLAN OF CARE
Problem: Occupational Therapy Goal  Goal: Occupational Therapy Goal  Goals to be met by: 10/29/18    Patient will increase functional independence with ADLs by performing:    Feeding with Set-up Assistance. MET  UE Dressing with Supervision. MET  LE Dressing with Supervision in sitting. MET  Grooming while standing at sink with Supervision with AD as needed. MET  Toileting from toilet with Supervision for hygiene and clothing management. MET  Step transfer with SBA with AD if needed to prepare for household tasks. MET  Toilet transfer to toilet with Supervision. MET  Upper extremity exercise program x15 reps per handout, with independence. MET      Outcome: Outcome(s) achieved Date Met: 10/25/18  All goals met; pt no longer appropriate for acute OT services    Comments: D/C OT 10/25/2018    Agnes Zhong, OT

## 2018-10-25 NOTE — PROGRESS NOTES
[facility]  General Surgery  Progress Note    Subjective:     History of Present Illness:  No notes on file    Post-Op Info:  Procedure(s) (LRB):  EXCISION, duodenal resection (N/A)  REVISION, jejunostomy (N/A)  CHOLECYSTECTOMY  CREATION, JEJUNOSTOMY   6 Days Post-Op     Interval History: Complaints of abd distension and pain overnight. Workup showed possible ileus. TF held. Good UOP. Ambulating well.  Medications:  Continuous Infusions:    Scheduled Meds:   albuterol-ipratropium  3 mL Nebulization Q4H    enoxaparin  40 mg Subcutaneous Daily    metoprolol  12.5 mg Per J Tube BID    pantoprazole  40 mg Intravenous Daily     PRN Meds:dextrose 50%, glucagon (human recombinant), hydrocodone-apap 7.5-325 MG/15 ML, ondansetron, oxyCODONE, promethazine (PHENERGAN) IVPB, simethicone     Review of patient's allergies indicates:   Allergen Reactions    Kiwi (actinidia chinensis)      Objective:     Vital Signs (Most Recent):  Temp: 97.7 °F (36.5 °C) (10/25/18 0349)  Pulse: (!) 112 (10/25/18 0349)  Resp: 18 (10/25/18 0349)  BP: 116/60 (10/25/18 0349)  SpO2: 100 % (10/25/18 0349) Vital Signs (24h Range):  Temp:  [96.5 °F (35.8 °C)-99.1 °F (37.3 °C)] 97.7 °F (36.5 °C)  Pulse:  [] 112  Resp:  [12-20] 18  SpO2:  [95 %-100 %] 100 %  BP: (116-133)/(60-94) 116/60     Weight: 58.1 kg (127 lb 16 oz)  Body mass index is 18.9 kg/m².    Intake/Output - Last 3 Shifts       10/23 0700 - 10/24 0659 10/24 0700 - 10/25 0659    P.O. 800 160    I.V. (mL/kg) 20 (0.3) 40 (0.7)    NG/GT 1253 941    Total Intake(mL/kg) 2073 (35.7) 1141 (19.6)    Urine (mL/kg/hr) 1350 (1) 1880 (1.3)    Stool 0     Total Output 1350 1880    Net +723 -739          Urine Occurrence 2 x     Stool Occurrence 2 x           Physical Exam   Constitutional: He is oriented to person, place, and time. He appears well-developed and well-nourished. No distress.   Cardiovascular: Normal rate and regular rhythm.   Pulmonary/Chest: Effort normal. No  respiratory distress.   Abdominal: Soft. He exhibits no distension (appropriate post op tenderness). Tenderness: appropriate for surgery.   Incision c/d/i. J-tube in place   Neurological: He is alert and oriented to person, place, and time.   Skin: Skin is dry.       Significant Labs:  CBC:   Recent Labs   Lab 10/25/18  0346   WBC 5.79   RBC 3.27*   HGB 9.6*   HCT 30.8*      MCV 94   MCH 29.4   MCHC 31.2*     CMP:   Recent Labs   Lab 10/25/18  0346   GLU 87   CALCIUM 8.6*   ALBUMIN 2.2*   PROT 5.1*      K 4.8   CO2 28      BUN 6   CREATININE 0.5   ALKPHOS 189*   ALT 41   AST 40   BILITOT 0.6       Significant Diagnostics:  I have reviewed all pertinent imaging results/findings within the past 24 hours.    Assessment/Plan:     * Blind duodenal loop syndrome    36 yo male s/p duodenal resection with primary anastomosis    Regular diet  Continue tube feeds  Ambulate  DVT prophylaxis    Dispo: Cont ambulating in halls. Will monitor bowel function today.          Yury Lockhart MD  General Surgery  [facility]

## 2018-10-25 NOTE — PLAN OF CARE
Problem: Patient Care Overview  Goal: Plan of Care Review  Outcome: Ongoing (interventions implemented as appropriate)  POC reviewed and understood by patient. Patient's AAOx4. Pain managed by prn pain meds per md order. Patient's IV remained patent and intact. Pt voided per urinal. Pt tolerated sips of fluid. Patient c/o abd fullness, distension and increased pain around j tube. Notified MD on call for surgery and he said to hold TF. MD ordered a chest and a abd xray.  J tube remained intact. BS checked q6h. Patient's VSS. Call light WNR. Bed in lowest position. No acute events at this time. WCTM.

## 2018-10-25 NOTE — PLAN OF CARE
Problem: Patient Care Overview  Goal: Plan of Care Review  Outcome: Ongoing (interventions implemented as appropriate)  POC reviewed with pt and his family at bedside, all questions and concerns addressed. VSS on RA, AAOx4. Pt has regular diet ordered, but due to complications last night, is not taking anything by mouth and has not eaten today. Voids per urinal with adequate UOP. Ambulates in room and walked in hallway with walker twice today, RN encouraged pt to walk again before tonight. TF infusing at 40mL/hr, pt complaints of abdominal tightness and soreness. Pain managed with scheduled toradol. No adverse events. Pt resting with call light in reach and family at bedside. WCTM.

## 2018-10-25 NOTE — SUBJECTIVE & OBJECTIVE
Interval History: Complaints of abd distension and pain overnight. Workup showed possible ileus. TF held. Good UOP. Ambulating well.  Medications:  Continuous Infusions:    Scheduled Meds:   albuterol-ipratropium  3 mL Nebulization Q4H    enoxaparin  40 mg Subcutaneous Daily    metoprolol  12.5 mg Per J Tube BID    pantoprazole  40 mg Intravenous Daily     PRN Meds:dextrose 50%, glucagon (human recombinant), hydrocodone-apap 7.5-325 MG/15 ML, ondansetron, oxyCODONE, promethazine (PHENERGAN) IVPB, simethicone     Review of patient's allergies indicates:   Allergen Reactions    Kiwi (actinidia chinensis)      Objective:     Vital Signs (Most Recent):  Temp: 97.7 °F (36.5 °C) (10/25/18 0349)  Pulse: (!) 112 (10/25/18 0349)  Resp: 18 (10/25/18 0349)  BP: 116/60 (10/25/18 0349)  SpO2: 100 % (10/25/18 0349) Vital Signs (24h Range):  Temp:  [96.5 °F (35.8 °C)-99.1 °F (37.3 °C)] 97.7 °F (36.5 °C)  Pulse:  [] 112  Resp:  [12-20] 18  SpO2:  [95 %-100 %] 100 %  BP: (116-133)/(60-94) 116/60     Weight: 58.1 kg (127 lb 16 oz)  Body mass index is 18.9 kg/m².    Intake/Output - Last 3 Shifts       10/23 0700 - 10/24 0659 10/24 0700 - 10/25 0659    P.O. 800 160    I.V. (mL/kg) 20 (0.3) 40 (0.7)    NG/GT 1253 941    Total Intake(mL/kg) 2073 (35.7) 1141 (19.6)    Urine (mL/kg/hr) 1350 (1) 1880 (1.3)    Stool 0     Total Output 1350 1880    Net +723 -739          Urine Occurrence 2 x     Stool Occurrence 2 x           Physical Exam   Constitutional: He is oriented to person, place, and time. He appears well-developed and well-nourished. No distress.   Cardiovascular: Normal rate and regular rhythm.   Pulmonary/Chest: Effort normal. No respiratory distress.   Abdominal: Soft. He exhibits no distension (appropriate post op tenderness). Tenderness: appropriate for surgery.   Incision c/d/i. J-tube in place   Neurological: He is alert and oriented to person, place, and time.   Skin: Skin is dry.       Significant Labs:  CBC:    Recent Labs   Lab 10/25/18  0346   WBC 5.79   RBC 3.27*   HGB 9.6*   HCT 30.8*      MCV 94   MCH 29.4   MCHC 31.2*     CMP:   Recent Labs   Lab 10/25/18  0346   GLU 87   CALCIUM 8.6*   ALBUMIN 2.2*   PROT 5.1*      K 4.8   CO2 28      BUN 6   CREATININE 0.5   ALKPHOS 189*   ALT 41   AST 40   BILITOT 0.6       Significant Diagnostics:  I have reviewed all pertinent imaging results/findings within the past 24 hours.

## 2018-10-26 LAB
ALBUMIN SERPL BCP-MCNC: 2.3 G/DL
ALP SERPL-CCNC: 141 U/L
ALT SERPL W/O P-5'-P-CCNC: 38 U/L
ANION GAP SERPL CALC-SCNC: 7 MMOL/L
AST SERPL-CCNC: 25 U/L
BASOPHILS # BLD AUTO: 0.04 K/UL
BASOPHILS NFR BLD: 0.8 %
BILIRUB SERPL-MCNC: 0.6 MG/DL
BUN SERPL-MCNC: 7 MG/DL
CALCIUM SERPL-MCNC: 9 MG/DL
CHLORIDE SERPL-SCNC: 103 MMOL/L
CO2 SERPL-SCNC: 28 MMOL/L
CREAT SERPL-MCNC: 0.6 MG/DL
DIFFERENTIAL METHOD: ABNORMAL
EOSINOPHIL # BLD AUTO: 0.7 K/UL
EOSINOPHIL NFR BLD: 12.4 %
ERYTHROCYTE [DISTWIDTH] IN BLOOD BY AUTOMATED COUNT: 12.3 %
EST. GFR  (AFRICAN AMERICAN): >60 ML/MIN/1.73 M^2
EST. GFR  (NON AFRICAN AMERICAN): >60 ML/MIN/1.73 M^2
GLUCOSE SERPL-MCNC: 77 MG/DL
HCT VFR BLD AUTO: 31.4 %
HGB BLD-MCNC: 9.6 G/DL
IMM GRANULOCYTES # BLD AUTO: 0.02 K/UL
IMM GRANULOCYTES NFR BLD AUTO: 0.4 %
LYMPHOCYTES # BLD AUTO: 0.9 K/UL
LYMPHOCYTES NFR BLD: 17.9 %
MAGNESIUM SERPL-MCNC: 1.8 MG/DL
MCH RBC QN AUTO: 28.7 PG
MCHC RBC AUTO-ENTMCNC: 30.6 G/DL
MCV RBC AUTO: 94 FL
MONOCYTES # BLD AUTO: 0.4 K/UL
MONOCYTES NFR BLD: 7.8 %
NEUTROPHILS # BLD AUTO: 3.2 K/UL
NEUTROPHILS NFR BLD: 60.7 %
NRBC BLD-RTO: 0 /100 WBC
PHOSPHATE SERPL-MCNC: 4.2 MG/DL
PLATELET # BLD AUTO: 233 K/UL
PMV BLD AUTO: 10.5 FL
POTASSIUM SERPL-SCNC: 5 MMOL/L
PROT SERPL-MCNC: 5.2 G/DL
RBC # BLD AUTO: 3.34 M/UL
SODIUM SERPL-SCNC: 138 MMOL/L
WBC # BLD AUTO: 5.24 K/UL

## 2018-10-26 PROCEDURE — 99900035 HC TECH TIME PER 15 MIN (STAT)

## 2018-10-26 PROCEDURE — 25000003 PHARM REV CODE 250: Performed by: STUDENT IN AN ORGANIZED HEALTH CARE EDUCATION/TRAINING PROGRAM

## 2018-10-26 PROCEDURE — 36415 COLL VENOUS BLD VENIPUNCTURE: CPT

## 2018-10-26 PROCEDURE — 25000003 PHARM REV CODE 250: Performed by: SURGERY

## 2018-10-26 PROCEDURE — 63600175 PHARM REV CODE 636 W HCPCS: Performed by: SURGERY

## 2018-10-26 PROCEDURE — 83735 ASSAY OF MAGNESIUM: CPT

## 2018-10-26 PROCEDURE — 94761 N-INVAS EAR/PLS OXIMETRY MLT: CPT

## 2018-10-26 PROCEDURE — 94664 DEMO&/EVAL PT USE INHALER: CPT

## 2018-10-26 PROCEDURE — 97116 GAIT TRAINING THERAPY: CPT

## 2018-10-26 PROCEDURE — 20600001 HC STEP DOWN PRIVATE ROOM

## 2018-10-26 PROCEDURE — 85025 COMPLETE CBC W/AUTO DIFF WBC: CPT

## 2018-10-26 PROCEDURE — 80053 COMPREHEN METABOLIC PANEL: CPT

## 2018-10-26 PROCEDURE — 84100 ASSAY OF PHOSPHORUS: CPT

## 2018-10-26 RX ORDER — BISACODYL 10 MG
10 SUPPOSITORY, RECTAL RECTAL DAILY
Status: DISCONTINUED | OUTPATIENT
Start: 2018-10-26 | End: 2018-10-27 | Stop reason: HOSPADM

## 2018-10-26 RX ADMIN — SUCRALFATE 1 G: 1 SUSPENSION ORAL at 06:10

## 2018-10-26 RX ADMIN — ENOXAPARIN SODIUM 40 MG: 100 INJECTION SUBCUTANEOUS at 05:10

## 2018-10-26 RX ADMIN — KETOROLAC TROMETHAMINE 30 MG: 30 INJECTION, SOLUTION INTRAMUSCULAR at 05:10

## 2018-10-26 RX ADMIN — SUCRALFATE 1 G: 1 SUSPENSION ORAL at 01:10

## 2018-10-26 RX ADMIN — SUCRALFATE 1 G: 1 SUSPENSION ORAL at 10:10

## 2018-10-26 RX ADMIN — PANTOPRAZOLE SODIUM 40 MG: 40 TABLET, DELAYED RELEASE ORAL at 08:10

## 2018-10-26 RX ADMIN — METOPROLOL SUCCINATE 50 MG: 50 TABLET, EXTENDED RELEASE ORAL at 08:10

## 2018-10-26 RX ADMIN — ASPIRIN 81 MG: 81 TABLET, COATED ORAL at 08:10

## 2018-10-26 RX ADMIN — OXYCODONE HYDROCHLORIDE 10 MG: 10 TABLET ORAL at 09:10

## 2018-10-26 RX ADMIN — POLYETHYLENE GLYCOL 3350 17 G: 17 POWDER, FOR SOLUTION ORAL at 08:10

## 2018-10-26 RX ADMIN — Medication 100 MG: at 08:10

## 2018-10-26 RX ADMIN — MULTIPLE VITAMINS W/ MINERALS TAB 1 TABLET: TAB at 08:10

## 2018-10-26 NOTE — PLAN OF CARE
Patient with suspected ileus midweek, started back on tube feeds which were held again last night for abdominal distension.  Patient may discharge home with OP PT/OT over the weekend.  Will continue to follow for needs.       10/26/18 1535   Discharge Reassessment   Assessment Type Discharge Planning Reassessment   Discharge Plan A Home with family

## 2018-10-26 NOTE — PLAN OF CARE
Problem: Patient Care Overview  Goal: Plan of Care Review  Outcome: Ongoing (interventions implemented as appropriate)  Plan of care reviewed with the patient,, a 37 year old male with the DX of Duodenal Loop syndrome,,, on the 19 th of this month he had a Duodenal excision cholecystectomy with right lower quad J tube placed,,, alert and oriented ,, midline with derm ZEENAT,,, regular diet,, denies pain,, stand by assist,, uneventful night,, all vitals WNL,, WCTM,, regular diet that he is not tolerating well,, bed locked and low call light in reach,,,

## 2018-10-26 NOTE — PROGRESS NOTES
[facility]  General Surgery  Progress Note    Subjective:     History of Present Illness:  No notes on file    Post-Op Info:  Procedure(s) (LRB):  EXCISION, duodenal resection (N/A)  REVISION, jejunostomy (N/A)  CHOLECYSTECTOMY  CREATION, JEJUNOSTOMY   7 Days Post-Op     Interval History: NAEON. Complains of soreness but no crampy abdpain. Intermittently having pain. No appetite. 1 small BM yesterday. Walked yesterday.  Medications:  Continuous Infusions:    Scheduled Meds:   aspirin  81 mg Oral Daily    bisacodyl  10 mg Rectal Daily    enoxaparin  40 mg Subcutaneous Daily    metoprolol succinate  50 mg Oral Daily    multivit-iron-FA-calcium-mins  1 tablet Oral Daily    pantoprazole  40 mg Oral Daily    polyethylene glycol  17 g Oral Daily    sucralfate  1 g Oral QID (AC & HS)    thiamine  100 mg Oral Daily     PRN Meds:bisacodyl, dextrose 50%, glucagon (human recombinant), ondansetron, oxyCODONE, oxyCODONE-acetaminophen, promethazine (PHENERGAN) IVPB, simethicone     Review of patient's allergies indicates:   Allergen Reactions    Kiwi (actinidia chinensis)      Objective:     Vital Signs (Most Recent):  Temp: 96 °F (35.6 °C) (10/26/18 0359)  Pulse: 82 (10/26/18 0359)  Resp: 18 (10/26/18 0359)  BP: 127/83 (10/26/18 0359)  SpO2: 99 % (10/26/18 0359) Vital Signs (24h Range):  Temp:  [96 °F (35.6 °C)-98.4 °F (36.9 °C)] 96 °F (35.6 °C)  Pulse:  [82-97] 82  Resp:  [16-18] 18  SpO2:  [97 %-100 %] 99 %  BP: (108-136)/(72-88) 127/83     Weight: 58.1 kg (127 lb 16 oz)  Body mass index is 18.9 kg/m².    Intake/Output - Last 3 Shifts       10/24 0700 - 10/25 0659 10/25 0700 - 10/26 0659 10/26 0700 - 10/27 0659    P.O. 160 600     I.V. (mL/kg) 40 (0.7) 22 (0.4)     NG/ 422     Total Intake(mL/kg) 1141 (19.6) 1044 (18)     Urine (mL/kg/hr) 1880 (1.3) 1750 (1.3)     Stool       Total Output 1880 1750     Net -084 -044                  Physical Exam   Constitutional: He is oriented to person,  place, and time. He appears well-developed and well-nourished. No distress.   Cardiovascular: Normal rate and regular rhythm.   Pulmonary/Chest: Effort normal. No respiratory distress.   Abdominal: Soft. He exhibits no distension (appropriate post op tenderness). Tenderness: appropriate for surgery.   Incision c/d/i. J-tube in place   Neurological: He is alert and oriented to person, place, and time.   Skin: Skin is warm and dry.       Significant Labs:  CBC:   Recent Labs   Lab 10/25/18  0346   WBC 5.79   RBC 3.27*   HGB 9.6*   HCT 30.8*      MCV 94   MCH 29.4   MCHC 31.2*     CMP:   Recent Labs   Lab 10/25/18  0346   GLU 87   CALCIUM 8.6*   ALBUMIN 2.2*   PROT 5.1*      K 4.8   CO2 28      BUN 6   CREATININE 0.5   ALKPHOS 189*   ALT 41   AST 40   BILITOT 0.6       Significant Diagnostics:  I have reviewed all pertinent imaging results/findings within the past 24 hours.    Assessment/Plan:     * Blind duodenal loop syndrome    36 yo male s/p duodenal resection with primary anastomosis    KUB this AM  Will give suppository  Regular diet  Continue tube feeds  Ambulate  DVT prophylaxis    Dispo: Cont ambulating in halls. Will cont to  monitor bowel function today.          Yury Lockhart MD  General Surgery  [facility]

## 2018-10-26 NOTE — PT/OT/SLP PROGRESS
Physical Therapy Treatment/Discharge    Patient Name:  Casper Fenton   MRN:  [id number]    Recommendations:     Discharge Recommendations:  outpatient PT   Discharge Equipment Recommendations: cane, straight   Barriers to discharge: None    Assessment:     Casper Fenton is a 37 y.o. male admitted with a medical diagnosis of Blind duodenal loop syndrome.  He presents with the following impairments/functional limitations:  gait instability Pt. cooperative and tolerated treatment well. Pt. progressing with mobility and has met goals for acute PT.    Rehab Prognosis:  Good; patient would benefit from acute skilled PT services to address these deficits and reach maximum level of function.      Recent Surgery: Procedure(s) (LRB):  EXCISION, duodenal resection (N/A)  REVISION, jejunostomy (N/A)  CHOLECYSTECTOMY  CREATION, JEJUNOSTOMY 7 Days Post-Op    Plan:     ·  (discontinue acute PT)   ·   · Plan of Care Expires:  11/21/18   Plan of Care Reviewed with: patient    Subjective     Communicated with nursing prior to session.  Patient found supine upon PT entry to room, agreeable to treatment.      Chief Complaint: none stated  Patient comments/goals: to go home  Pain/Comfort:  · Pain Rating 1: 0/10  · Pain Rating Post-Intervention 1: 0/10    Patients cultural, spiritual, Congregational conflicts given the current situation: no    Objective:     Patient found with: peripheral IV     General Precautions: Standard, fall   Orthopedic Precautions:N/A   Braces:       Functional Mobility:  · Bed Mobility:     · Rolling Right: modified independence  · Scooting: modified independence  · Supine to Sit: modified independence  · Transfers:  Sit to Stand:  modified independence with no AD  · Gait: 400' with SC and Supervision without LOB  · Balance: fair+      AM-PAC 6 CLICK MOBILITY  Turning over in bed (including adjusting bedclothes, sheets and blankets)?: 4  Sitting down on and standing up from a chair with arms (e.g.,  wheelchair, bedside commode, etc.): 4  Moving from lying on back to sitting on the side of the bed?: 4  Moving to and from a bed to a chair (including a wheelchair)?: 4  Need to walk in hospital room?: 4  Climbing 3-5 steps with a railing?: 3  Basic Mobility Total Score: 23       Therapeutic Activities and Exercises:   Discussed pt.'s progress, DME needs, goals, and POC.    Patient left in bathroom with all lines intact and call button in reach..    GOALS:   Multidisciplinary Problems     Physical Therapy Goals     Not on file          Multidisciplinary Problems (Resolved)        Problem: Physical Therapy Goal    Goal Priority Disciplines Outcome Goal Variances Interventions   Physical Therapy Goal   (Resolved)     PT, PT/OT Outcome(s) achieved     Description:  Goals to be met by: Oct. 29     Patient will increase functional independence with mobility by performin. Sit to stand transfer with Modified [place] - Met  2. Bed to chair transfer with Modified [place] using Rolling Walker - Met  3. Gait  x 200 feet with Contact guard assistance using Rolling Walker. - met        Revised: Gait  x 300 feet with Supervision using Rolling Walker. - met  4. Stand for 2 minutes with Stand-by Assistance while performing an activity/standing exercises with no UE support. - Met                            Time Tracking:     PT Received On: 10/26/18  PT Start Time: 1405     PT Stop Time: 1417  PT Total Time (min): 12 min     Billable Minutes: Gait Training 12    Treatment Type: Treatment  PT/PTA: PT     PTA Visit Number: 0     Erik Lazo, PT  10/26/2018

## 2018-10-26 NOTE — PROGRESS NOTES
" [facility]  Adult Nutrition  Progress Note    SUMMARY       Recommendations    Recommendation/Intervention:     1. Recommend advancing TF of Isosource 1.5 as tolerated to goal rate of 65 mL/hr x 18 hrs (2p-8a) to provide 1755 kcal,  80 gm protein, and 893 mL water.   2. Continue Regular diet as tolerated. Encourage po intake.   3. RD following.     Goals: meet >85% EEN/EPN via TF   Nutrition Goal Status: progressing towards goal  Communication of RD Recs: (POC)    Reason for Assessment    Reason for Assessment: RD follow-up  Diagnosis: gastrointestinal disease(Blind duodenal loop syndrome)  Relevant Medical History: pancreas divisum, SBS, congenital intestinal atresia s/p bypass  Interdisciplinary Rounds: attended  General Information Comments: POD7 doudenal resection. Nocturnal TFs @ 50 mL/hr. Unable to see pt x 2 attempts. Per chart review, pt still complaining of being bloated with poor appetite. Will recheck abdominal xray - continue diet trial and resume tube feeds if xray is ok.  Pt to D/C tomorrow. NFPE completed 10/23 -  Pt may have been malnourished at one time, but does not meet malnutrition criteria at this time. RD to monitor.   Nutrition Discharge Planning: adequate nutrition via TF vs po intake    Nutrition Risk Screen    Nutrition Risk Screen: no indicators present    Nutrition/Diet History    Do you have any cultural, spiritual, [employer] conflicts, given your current situation?: no  Factors Affecting Nutritional Intake: decreased appetite, other (see comments)(bloating)    Anthropometrics    Temp: 98.1 °F (36.7 °C)  Height Method: Stated  Height: 5' 9" (175.3 cm)  Height (inches): 69 in  Weight Method: Stated  Weight: 58.1 kg (127 lb 16 oz)  Weight (lb): 128 lb  Ideal Body Weight (IBW), Male: 160 lb  % Ideal Body Weight, Male (lb): 80 lb  BMI (Calculated): 18.9  BMI Grade: 18.5-24.9 - normal     Lab/Procedures/Meds    Pertinent Labs Reviewed: reviewed  Pertinent Labs Comments: " alk phos 141  Pertinent Medications Reviewed: reviewed  Pertinent Medications Comments: aspirin, Mg oxide, metoprolol, pantoprazole, thiamine    Physical Findings/Assessment    Overall Physical Appearance: loss of muscle mass, other (see comments)(thin)  Tubes: jejunostomy tube  Skin: incision(s)    Estimated/Assessed Needs    Weight Used For Calorie Calculations: 58.1 kg (128 lb 1.4 oz)  Energy Calorie Requirements (kcal): 1876 kcal/day  Energy Need Method: [place]-St Jeor(x 1.25)  Protein Requirements: 58-75 kcal/day(1.0-1.3 kcal/kg)  Weight Used For Protein Calculations: 58.1 kg (128 lb 1.4 oz)  Fluid Requirements (mL): 1 mL/kcal or per MD  Fluid Need Method: other (see comments)(per MD)  RDA Method (mL): 1876     Nutrition Prescription Ordered    Current Diet Order: Regular   Current Nutrition Support Formula Ordered: Isosource 1.5  Current Nutrition Support Rate Ordered: 50 (ml)  Current Nutrition Support Frequency Ordered: mL/hr(2p-8a)    Evaluation of Received Nutrient/Fluid Intake    Enteral Calories (kcal): 1350  Enteral Protein (gm): 61  Enteral (Free Water) Fluid (mL): 687  % Kcal Needs: 72%  % Protein Needs: 33%  I/O: +5.1L since admit  Energy Calories Required: not meeting needs  Protein Required: not meeting needs  Fluid Required: other (see comments)(per MD)  Comments: LBM 10/23(small BM)  Tolerance: tolerating  % Intake of Estimated Energy Needs: Other: 72% EEN, 33% EPN  % Meal Intake: 0 - 25 %    Nutrition Risk    Level of Risk/Frequency of Follow-up: high(f/u 2 x wk)     Assessment and Plan    Nutrition Problem  Altered GI Function     Related to (etiology):   Blind duodenal loop syndrome     Signs and Symptoms (as evidenced by):   S/p doudenal resection and need for TF to meet >85% EEN/EPN.      Nutrition Diagnosis Status:   Improving - pt advanced to Regular diet.     Monitor and Evaluation    Food and Nutrient Intake: energy intake, food and beverage intake, enteral nutrition intake  Food and  Nutrient Adminstration: diet order  Physical Activity and Function: nutrition-related ADLs and IADLs  Anthropometric Measurements: weight, weight change, body mass index  Biochemical Data, Medical Tests and Procedures: electrolyte and renal panel, gastrointestinal profile, glucose/endocrine profile, inflammatory profile, lipid profile  Nutrition-Focused Physical Findings: overall appearance     Nutrition Follow-Up    RD Follow-up?: Yes

## 2018-10-26 NOTE — PLAN OF CARE
CALVIN learned from NP, Rosmery Gupta, that Pt would not be discharging home until tomorrow. CALVIN notified Amany with [facility] of above. 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     Mariah Parsons, RAULW      10/26/18 1204   Post-Acute Status   Post-Acute Authorization [facility]

## 2018-10-26 NOTE — PLAN OF CARE
Problem: Physical Therapy Goal  Goal: Physical Therapy Goal  Goals to be met by: Oct. 29     Patient will increase functional independence with mobility by performin. Sit to stand transfer with Modified [place] - Met  2. Bed to chair transfer with Modified [place] using Rolling Walker - Met  3. Gait  x 200 feet with Contact guard assistance using Rolling Walker. - met        Revised: Gait  x 300 feet with Supervision using Rolling Walker. - met  4. Stand for 2 minutes with Stand-by Assistance while performing an activity/standing exercises with no UE support. - Met          Outcome: Outcome(s) achieved Date Met: 10/26/18  Goals met

## 2018-10-26 NOTE — PLAN OF CARE
[facility]       OUTPATIENT ORDERS                                        Patient Name: Casper Fenton  YOB: 1980    PCP: Moose Dillard MD   PCP Address: [id number] [place], [place] [id number]  PCP Phone Number: [phone]  PCP Fax: [phone]    Encounter Date: 10/26/2018    Admit to Outpatient PT/OT    Diagnoses:  Active Hospital Problems    Diagnosis  POA    *Blind duodenal loop syndrome [K91.89]  Yes     Chronic    Abdominal adhesions [K66.0]  Yes    Persistent atrial fibrillation [I48.1]  Yes    Failure to thrive in adult [R62.7]  Yes      Resolved Hospital Problems   No resolved problems to display.       Future Appointments   Date Time Provider Department Center   11/8/2018 10:30 AM [facility] Tucker NP [facility]     Follow-up Information     Keyonna Tucker NP On 11/8/2018.    Specialty:  General Surgery  Why:  SURGICAL FOLLOW UP with Keyonna Tucker NP in Dr Terry's office 11/9/2018 @ 10:30am in the [facility]  Contact information:  [address] [facility] [address]  [phone]                   Activities: activity as tolerated    CONSULTS:    Physical Therapy to evaluate and treat. Evaluate as an outpatient for home safety and equipment needs; Establish/upgrade home exercise program. Perform / instruct on therapeutic exercises, gait training, transfer training, and Range of Motion.  Occupational Therapy to evaluate and treat. Evaluate as an outpatient for home environment for safety and equipment needs. Perform/Instruct on transfers, ADL training, ROM, and therapeutic exercises.      Electronically Signed  _________________________________  Rosmery Montez NP  10/26/2018

## 2018-10-26 NOTE — PLAN OF CARE
Problem: Patient Care Overview  Goal: Plan of Care Review      Recommendations    Recommendation/Intervention:     1. Recommend advancing TF of Isosource 1.5 as tolerated to goal rate of 65 mL/hr x 18 hrs (2p-8a) to provide 1755 kcal,  80 gm protein, and 893 mL water.   2. Continue Regular diet as tolerated. Encourage po intake.   3. RD following.     Goals: meet >85% EEN/EPN via TF   Nutrition Goal Status: progressing towards goal  Communication of RD Recs: (POC)

## 2018-10-26 NOTE — ASSESSMENT & PLAN NOTE
36 yo male s/p duodenal resection with primary anastomosis    KUB this AM  Will give suppository  Regular diet  Continue tube feeds  Ambulate  DVT prophylaxis    Dispo: Cont ambulating in halls. Will cont to  monitor bowel function today.

## 2018-10-26 NOTE — SUBJECTIVE & OBJECTIVE
Interval History: NAEON. Complains of soreness but no crampy abdpain. Intermittently having pain. No appetite. 1 small BM yesterday. Walked yesterday.  Medications:  Continuous Infusions:    Scheduled Meds:   aspirin  81 mg Oral Daily    bisacodyl  10 mg Rectal Daily    enoxaparin  40 mg Subcutaneous Daily    metoprolol succinate  50 mg Oral Daily    multivit-iron-FA-calcium-mins  1 tablet Oral Daily    pantoprazole  40 mg Oral Daily    polyethylene glycol  17 g Oral Daily    sucralfate  1 g Oral QID (AC & HS)    thiamine  100 mg Oral Daily     PRN Meds:bisacodyl, dextrose 50%, glucagon (human recombinant), ondansetron, oxyCODONE, oxyCODONE-acetaminophen, promethazine (PHENERGAN) IVPB, simethicone     Review of patient's allergies indicates:   Allergen Reactions    Kiwi (actinidia chinensis)      Objective:     Vital Signs (Most Recent):  Temp: 96 °F (35.6 °C) (10/26/18 0359)  Pulse: 82 (10/26/18 0359)  Resp: 18 (10/26/18 0359)  BP: 127/83 (10/26/18 0359)  SpO2: 99 % (10/26/18 0359) Vital Signs (24h Range):  Temp:  [96 °F (35.6 °C)-98.4 °F (36.9 °C)] 96 °F (35.6 °C)  Pulse:  [82-97] 82  Resp:  [16-18] 18  SpO2:  [97 %-100 %] 99 %  BP: (108-136)/(72-88) 127/83     Weight: 58.1 kg (127 lb 16 oz)  Body mass index is 18.9 kg/m².    Intake/Output - Last 3 Shifts       10/24 0700 - 10/25 0659 10/25 0700 - 10/26 0659 10/26 0700 - 10/27 0659    P.O. 160 600     I.V. (mL/kg) 40 (0.7) 22 (0.4)     NG/ 422     Total Intake(mL/kg) 1141 (19.6) 1044 (18)     Urine (mL/kg/hr) 1880 (1.3) 1750 (1.3)     Stool       Total Output 1880 1750     Net -739 -706                  Physical Exam   Constitutional: He is oriented to person, place, and time. He appears well-developed and well-nourished. No distress.   Cardiovascular: Normal rate and regular rhythm.   Pulmonary/Chest: Effort normal. No respiratory distress.   Abdominal: Soft. He exhibits no distension (appropriate post op tenderness). Tenderness: appropriate for  surgery.   Incision c/d/i. J-tube in place   Neurological: He is alert and oriented to person, place, and time.   Skin: Skin is warm and dry.       Significant Labs:  CBC:   Recent Labs   Lab 10/25/18  0346   WBC 5.79   RBC 3.27*   HGB 9.6*   HCT 30.8*      MCV 94   MCH 29.4   MCHC 31.2*     CMP:   Recent Labs   Lab 10/25/18  0346   GLU 87   CALCIUM 8.6*   ALBUMIN 2.2*   PROT 5.1*      K 4.8   CO2 28      BUN 6   CREATININE 0.5   ALKPHOS 189*   ALT 41   AST 40   BILITOT 0.6       Significant Diagnostics:  I have reviewed all pertinent imaging results/findings within the past 24 hours.

## 2018-10-27 VITALS
WEIGHT: 128 LBS | RESPIRATION RATE: 18 BRPM | OXYGEN SATURATION: 95 % | SYSTOLIC BLOOD PRESSURE: 122 MMHG | HEIGHT: 69 IN | BODY MASS INDEX: 18.96 KG/M2 | HEART RATE: 76 BPM | DIASTOLIC BLOOD PRESSURE: 88 MMHG | TEMPERATURE: 98 F

## 2018-10-27 LAB
ALBUMIN SERPL BCP-MCNC: 2.5 G/DL
ALP SERPL-CCNC: 117 U/L
ALT SERPL W/O P-5'-P-CCNC: 35 U/L
ANION GAP SERPL CALC-SCNC: 4 MMOL/L
AST SERPL-CCNC: 21 U/L
BASOPHILS # BLD AUTO: 0.05 K/UL
BASOPHILS NFR BLD: 0.8 %
BILIRUB SERPL-MCNC: 0.4 MG/DL
BUN SERPL-MCNC: 6 MG/DL
CALCIUM SERPL-MCNC: 8.5 MG/DL
CHLORIDE SERPL-SCNC: 103 MMOL/L
CO2 SERPL-SCNC: 30 MMOL/L
CREAT SERPL-MCNC: 0.6 MG/DL
DIFFERENTIAL METHOD: ABNORMAL
EOSINOPHIL # BLD AUTO: 0.6 K/UL
EOSINOPHIL NFR BLD: 9.2 %
ERYTHROCYTE [DISTWIDTH] IN BLOOD BY AUTOMATED COUNT: 12 %
EST. GFR  (AFRICAN AMERICAN): >60 ML/MIN/1.73 M^2
EST. GFR  (NON AFRICAN AMERICAN): >60 ML/MIN/1.73 M^2
GLUCOSE SERPL-MCNC: 102 MG/DL
HCT VFR BLD AUTO: 31.3 %
HGB BLD-MCNC: 10 G/DL
IMM GRANULOCYTES # BLD AUTO: 0.02 K/UL
IMM GRANULOCYTES NFR BLD AUTO: 0.3 %
LYMPHOCYTES # BLD AUTO: 1.2 K/UL
LYMPHOCYTES NFR BLD: 20.3 %
MAGNESIUM SERPL-MCNC: 1.8 MG/DL
MCH RBC QN AUTO: 29.5 PG
MCHC RBC AUTO-ENTMCNC: 31.9 G/DL
MCV RBC AUTO: 92 FL
MONOCYTES # BLD AUTO: 0.5 K/UL
MONOCYTES NFR BLD: 8 %
NEUTROPHILS # BLD AUTO: 3.8 K/UL
NEUTROPHILS NFR BLD: 61.4 %
NRBC BLD-RTO: 0 /100 WBC
PHOSPHATE SERPL-MCNC: 3.6 MG/DL
PLATELET # BLD AUTO: 267 K/UL
PMV BLD AUTO: 9.6 FL
POTASSIUM SERPL-SCNC: 4 MMOL/L
PROT SERPL-MCNC: 5.1 G/DL
RBC # BLD AUTO: 3.39 M/UL
SODIUM SERPL-SCNC: 137 MMOL/L
WBC # BLD AUTO: 6.12 K/UL

## 2018-10-27 PROCEDURE — 85025 COMPLETE CBC W/AUTO DIFF WBC: CPT

## 2018-10-27 PROCEDURE — 83735 ASSAY OF MAGNESIUM: CPT

## 2018-10-27 PROCEDURE — 84100 ASSAY OF PHOSPHORUS: CPT

## 2018-10-27 PROCEDURE — 25000003 PHARM REV CODE 250: Performed by: SURGERY

## 2018-10-27 PROCEDURE — 36415 COLL VENOUS BLD VENIPUNCTURE: CPT

## 2018-10-27 PROCEDURE — 80053 COMPREHEN METABOLIC PANEL: CPT

## 2018-10-27 RX ORDER — POLYETHYLENE GLYCOL 3350 17 G/17G
17 POWDER, FOR SOLUTION ORAL DAILY
Qty: 238 G | Refills: 0 | Status: SHIPPED | OUTPATIENT
Start: 2018-10-27 | End: 2018-12-19

## 2018-10-27 RX ORDER — OXYCODONE AND ACETAMINOPHEN 10; 325 MG/1; MG/1
1 TABLET ORAL EVERY 4 HOURS PRN
Qty: 31 TABLET | Refills: 0 | Status: SHIPPED | OUTPATIENT
Start: 2018-10-27 | End: 2018-12-19

## 2018-10-27 RX ADMIN — PANTOPRAZOLE SODIUM 40 MG: 40 TABLET, DELAYED RELEASE ORAL at 11:10

## 2018-10-27 RX ADMIN — Medication 100 MG: at 11:10

## 2018-10-27 RX ADMIN — POLYETHYLENE GLYCOL 3350 17 G: 17 POWDER, FOR SOLUTION ORAL at 11:10

## 2018-10-27 RX ADMIN — ASPIRIN 81 MG: 81 TABLET, COATED ORAL at 11:10

## 2018-10-27 RX ADMIN — METOPROLOL SUCCINATE 50 MG: 50 TABLET, EXTENDED RELEASE ORAL at 11:10

## 2018-10-27 RX ADMIN — SUCRALFATE 1 G: 1 SUSPENSION ORAL at 11:10

## 2018-10-27 RX ADMIN — SUCRALFATE 1 G: 1 SUSPENSION ORAL at 06:10

## 2018-10-27 RX ADMIN — MULTIPLE VITAMINS W/ MINERALS TAB 1 TABLET: TAB at 11:10

## 2018-10-27 NOTE — ASSESSMENT & PLAN NOTE
36 yo male s/p duodenal resection with primary anastomosis    Discharge today to home with tube feeds.

## 2018-10-27 NOTE — SUBJECTIVE & OBJECTIVE
Interval History: +bm, feeling better, no issues with diet or tube feeds.    Medications:  Continuous Infusions:    Scheduled Meds:   aspirin  81 mg Oral Daily    bisacodyl  10 mg Rectal Daily    enoxaparin  40 mg Subcutaneous Daily    metoprolol succinate  50 mg Oral Daily    multivit-iron-FA-calcium-mins  1 tablet Oral Daily    pantoprazole  40 mg Oral Daily    polyethylene glycol  17 g Oral Daily    sucralfate  1 g Oral QID (AC & HS)    thiamine  100 mg Oral Daily     PRN Meds:bisacodyl, dextrose 50%, glucagon (human recombinant), ondansetron, oxyCODONE, oxyCODONE-acetaminophen, promethazine (PHENERGAN) IVPB, simethicone     Review of patient's allergies indicates:   Allergen Reactions    Kiwi (actinidia chinensis)      Objective:     Vital Signs (Most Recent):  Temp: 98.1 °F (36.7 °C) (10/27/18 0343)  Pulse: 75 (10/27/18 0343)  Resp: 16 (10/27/18 0343)  BP: 113/76 (10/27/18 0343)  SpO2: 97 % (10/27/18 0343) Vital Signs (24h Range):  Temp:  [97.3 °F (36.3 °C)-98.9 °F (37.2 °C)] 98.1 °F (36.7 °C)  Pulse:  [75-92] 75  Resp:  [16-18] 16  SpO2:  [96 %-98 %] 97 %  BP: (113-130)/(75-89) 113/76     Weight: 58.1 kg (127 lb 16 oz)  Body mass index is 18.9 kg/m².    Intake/Output - Last 3 Shifts       10/25 0700 - 10/26 0659 10/26 0700 - 10/27 0659 10/27 0700 - 10/28 0659    P.O. 600 840     I.V. (mL/kg) 22 (0.4)      NG/ 470     Total Intake(mL/kg) 1044 (18) 1310 (22.5)     Urine (mL/kg/hr) 1750 (1.3) 1225 (0.9)     Total Output 1750 1225     Net -706 +85            Urine Occurrence  5 x           Physical Exam   Constitutional: He is oriented to person, place, and time. He appears well-developed and well-nourished. No distress.   Cardiovascular: Normal rate and regular rhythm.   Pulmonary/Chest: Effort normal. No respiratory distress.   Abdominal: Soft. He exhibits no distension (appropriate post op tenderness). Tenderness: appropriate for surgery.   Incision c/d/i. J-tube in place   Neurological: He is  alert and oriented to person, place, and time.   Skin: Skin is warm and dry.       Significant Labs:  CBC:   Recent Labs   Lab 10/27/18  0354   WBC 6.12   RBC 3.39*   HGB 10.0*   HCT 31.3*      MCV 92   MCH 29.5   MCHC 31.9*     CMP:   Recent Labs   Lab 10/27/18  0354      CALCIUM 8.5*   ALBUMIN 2.5*   PROT 5.1*      K 4.0   CO2 30*      BUN 6   CREATININE 0.6   ALKPHOS 117   ALT 35   AST 21   BILITOT 0.4       Significant Diagnostics:  I have reviewed all pertinent imaging results/findings within the past 24 hours.

## 2018-10-27 NOTE — DISCHARGE SUMMARY
[facility]  General Surgery  Discharge Summary      Patient Name: Casper Fenton  MRN: [id number]  Admission Date: 10/19/2018  Hospital Length of Stay: 8 days  Discharge Date and Time:  10/27/2018 7:57 AM  Attending Physician: Yovany Terry MD   Discharging Provider: Chaya Singh MD  Primary Care Provider: Moose Dillard MD     HPI: Pt admitted for surgery.    Procedure(s) (LRB):  EXCISION, duodenal resection (N/A)  REVISION, jejunostomy (N/A)  CHOLECYSTECTOMY  CREATION, JEJUNOSTOMY     Hospital Course: Pt tolerated procedure well and had an uncomplicated post op course. He had an expected ileus which resolved with expectant management. He is discharged on a regular diet and tube feeds.       Consults:     Significant Diagnostic Studies:     Pending Diagnostic Studies:     None        Final Active Diagnoses:    Diagnosis Date Noted POA    PRINCIPAL PROBLEM:  Blind duodenal loop syndrome [K91.89] 09/10/2018 Yes     Chronic    Abdominal adhesions [K66.0] 10/20/2018 Yes    Persistent atrial fibrillation [I48.1] 10/19/2018 Yes    Failure to thrive in adult [R62.7] 08/26/2018 Yes      Problems Resolved During this Admission:      Discharged Condition: good    Disposition:     Follow Up:  Follow-up Information     Keyonna Tucker NP On 11/8/2018.    Specialty:  General Surgery  Why:  SURGICAL FOLLOW UP with Keyonna Tucker NP in Dr Terry's office 11/9/2018 @ 10:30am in the [facility]  Contact information:  [address] [place] [id number]  [phone]                 Patient Instructions:   No discharge procedures on file.  Medications:  Reconciled Home Medications:      Medication List      START taking these medications    oxyCODONE-acetaminophen  mg per tablet  Commonly known as:  PERCOCET  Take 1 tablet by mouth every 4 (four) hours as needed for Pain (Do not drive while taking this medication).     polyethylene glycol 17 gram/dose powder  Commonly known  as:  GLYCOLAX  Take 17 g by mouth once daily.        ASK your doctor about these medications    acetaminophen 325 MG tablet  Commonly known as:  TYLENOL  Take 2 tablets (650 mg total) by mouth every 8 (eight) hours as needed for Temperature greater than (or equal to 101 degree F).     aspirin 81 MG EC tablet  Commonly known as:  ECOTRIN  Take 1 tablet (81 mg total) by mouth once daily.     B complx-C-folic-zinc-copper-E 500 mg-400 mcg- 24 mg-3 mg Tab  Take 1 capsule by mouth every 12 (twelve) hours.     cholestyramine-aspartame 4 gram Pwpk  Commonly known as:  QUESTRAN LIGHT  Take 1 packet (4 g total) by mouth every 8 (eight) hours.     metoprolol succinate 50 MG 24 hr tablet  Commonly known as:  TOPROL-XL  Take 1 tablet (50 mg total) by mouth once daily.     multivitamin-min-iron-FA-vit K 18 mg iron-400 mcg-25 mcg Tab  Take 1 tablet by mouth once daily.     pantoprazole 40 MG tablet  Commonly known as:  PROTONIX  Take 1 tablet (40 mg total) by mouth once daily.     sucralfate 1 gram tablet  Commonly known as:  CARAFATE  Take 1 tablet (1 g total) by mouth 4 (four) times daily.     testosterone cypionate 200 mg/mL injection  Commonly known as:  DEPOTESTOTERONE CYPIONATE  Inject 200 mg into the muscle every 14 (fourteen) days.     thiamine 100 MG tablet  Take 1 tablet (100 mg total) by mouth once daily.            Chaya Singh MD  General Surgery  [facility]

## 2018-10-27 NOTE — PLAN OF CARE
Problem: Patient Care Overview  Goal: Plan of Care Review  Outcome: Ongoing (interventions implemented as appropriate)  Luke Anderson RN   Registered Nurse      Plan of Care   Signed                     Problem: Patient Care Overview  Goal: Plan of Care Review  Outcome: Ongoing (interventions implemented as appropriate)  Plan of care reviewed with the patient,, a 37 year old male with the DX of Duodenal Loop syndrome,,, on the 19 th of this month he had a Duodenal excision cholecystectomy with right lower quad J tube placed,,, alert and oriented ,, midline with derm ZEENAT, J tube feedings at 50, complains of cramps,, stand by assist,, uneventful night,, all vitals WNL,, WCTM,, regular diet that he is not tolerating well,, bed locked and low call light in reach,,,

## 2018-10-27 NOTE — PLAN OF CARE
Pt being discharged home today.  CM put in call to [facility], [phone].  CM put in call to patient's wife, Brittaney, [phone], in reference to where they want the tube feeds delivered, to bedside, or to the patient's home.  Awaiting call back from family.      Did let nurse know if wife comes to hospital to call CM.    CM did speak with Brittaney, patient's wife who stated, that they would like delivery to the bedside.  Pt's family is providing transportation home.  Pt stated they would like their OP PT and OT thru [facility].  Ambulatory order put in Epic, per MD.  Pt ready to discharge home after delivery of Tube feeds.

## 2018-10-27 NOTE — PROGRESS NOTES
[facility]  General Surgery  Progress Note    Subjective:     History of Present Illness:  No notes on file    Post-Op Info:  Procedure(s) (LRB):  EXCISION, duodenal resection (N/A)  REVISION, jejunostomy (N/A)  CHOLECYSTECTOMY  CREATION, JEJUNOSTOMY   8 Days Post-Op     Interval History: +bm, feeling better, no issues with diet or tube feeds.    Medications:  Continuous Infusions:    Scheduled Meds:   aspirin  81 mg Oral Daily    bisacodyl  10 mg Rectal Daily    enoxaparin  40 mg Subcutaneous Daily    metoprolol succinate  50 mg Oral Daily    multivit-iron-FA-calcium-mins  1 tablet Oral Daily    pantoprazole  40 mg Oral Daily    polyethylene glycol  17 g Oral Daily    sucralfate  1 g Oral QID (AC & HS)    thiamine  100 mg Oral Daily     PRN Meds:bisacodyl, dextrose 50%, glucagon (human recombinant), ondansetron, oxyCODONE, oxyCODONE-acetaminophen, promethazine (PHENERGAN) IVPB, simethicone     Review of patient's allergies indicates:   Allergen Reactions    Kiwi (actinidia chinensis)      Objective:     Vital Signs (Most Recent):  Temp: 98.1 °F (36.7 °C) (10/27/18 0343)  Pulse: 75 (10/27/18 0343)  Resp: 16 (10/27/18 0343)  BP: 113/76 (10/27/18 0343)  SpO2: 97 % (10/27/18 0343) Vital Signs (24h Range):  Temp:  [97.3 °F (36.3 °C)-98.9 °F (37.2 °C)] 98.1 °F (36.7 °C)  Pulse:  [75-92] 75  Resp:  [16-18] 16  SpO2:  [96 %-98 %] 97 %  BP: (113-130)/(75-89) 113/76     Weight: 58.1 kg (127 lb 16 oz)  Body mass index is 18.9 kg/m².    Intake/Output - Last 3 Shifts       10/25 0700 - 10/26 0659 10/26 0700 - 10/27 0659 10/27 0700 - 10/28 0659    P.O. 600 840     I.V. (mL/kg) 22 (0.4)      NG/ 470     Total Intake(mL/kg) 1044 (18) 1310 (22.5)     Urine (mL/kg/hr) 1750 (1.3) 1225 (0.9)     Total Output 1750 1225     Net -706 +85            Urine Occurrence  5 x           Physical Exam   Constitutional: He is oriented to person, place, and time. He appears well-developed and well-nourished. No  distress.   Cardiovascular: Normal rate and regular rhythm.   Pulmonary/Chest: Effort normal. No respiratory distress.   Abdominal: Soft. He exhibits no distension (appropriate post op tenderness). Tenderness: appropriate for surgery.   Incision c/d/i. J-tube in place   Neurological: He is alert and oriented to person, place, and time.   Skin: Skin is warm and dry.       Significant Labs:  CBC:   Recent Labs   Lab 10/27/18  0354   WBC 6.12   RBC 3.39*   HGB 10.0*   HCT 31.3*      MCV 92   MCH 29.5   MCHC 31.9*     CMP:   Recent Labs   Lab 10/27/18  0354      CALCIUM 8.5*   ALBUMIN 2.5*   PROT 5.1*      K 4.0   CO2 30*      BUN 6   CREATININE 0.6   ALKPHOS 117   ALT 35   AST 21   BILITOT 0.4       Significant Diagnostics:  I have reviewed all pertinent imaging results/findings within the past 24 hours.    Assessment/Plan:     * Blind duodenal loop syndrome    36 yo male s/p duodenal resection with primary anastomosis    Discharge today to home with tube feeds.          Chaya Singh MD  General Surgery  [facility]

## 2018-10-27 NOTE — PLAN OF CARE
Problem: Patient Care Overview  Goal: Plan of Care Review  Plan of care reviewed with patient who verbalized understanding. AAOx4.  Patient denies pain or nausea. Patient tolertating tube feeds well. OOB with physical therapy and up to toilet throughoutshift. Frequent rounds made for patient safety.  Pt remains free of any falls/injury at this time. Bed locked and in lowest position.  Call bell in reach.  VSS, FIDEL.

## 2018-10-27 NOTE — NURSING
Discharge orders noted. Patient currently awaiting tube feeds to be delivered and awaiting wife for pickup.

## 2018-10-28 NOTE — NURSING
Discharge instruction reviewed with patient who verbalized understanding.  All questions answered.  Prescriptions given and reviewed with patient.  PIV D/C'd with catheter tip intact and discarded.  Patient currently awaiting transport.

## 2018-10-28 NOTE — PLAN OF CARE
Patient discharged home today with tube feeds from [facility] and Outpatient PT/OT.      Future Appointments   Date Time Provider Department Center   11/8/2018 10:30 AM Keyonna Tucker NP [facility]          10/27/18 8417   Final Note   Assessment Type Final Discharge Note   Anticipated Discharge Disposition Home   Hospital Follow Up  Appt(s) scheduled? Yes   Right Care Referral Info   Post Acute Recommendation No Care

## 2018-10-29 NOTE — PHYSICIAN QUERY
PT Name: Casper Fenton  MR #: [id number]     Physician Query Form - Documentation Clarification      CDS/: Tory Recinos  RN CCDS             Contact information: [email or web address]    This form is a permanent document in the medical record.     Query Date: October 29, 2018    By submitting this query, we are merely seeking further clarification of documentation. Please utilize your independent clinical judgment when addressing the question(s) below.    The Medical record reflects the following:    Supporting Clinical Findings Location in Medical Record     Blind loop syndrome and malnutrition.    Per his labs and clinical picture, he is moderately malnourished but I don't think he needs/would benefit from preop TPN.       Pre and post op diagnosis    H&P     General Information Comments: POD7 doudenal resection. Nocturnal TFs @ 50 mL/hr. Unable to see pt x 2 attempts. Per chart review, pt still complaining of being bloated with poor appetite. Will recheck abdominal xray - continue diet trial and resume tube feeds if xray is ok. Pt to D/C tomorrow. NFPE completed 10/23 - Pt may have been malnourished at one time, but does not meet malnutrition criteria at this time. RD to monitor.   Nutrition Discharge Planning: adequate nutrition via TF vs po intake          RD progress note 10/26                                                                            Based on the above clinical indicators, further clarify the malnutrition diagnosis.      Provider Use Only        [  xxx  ] Moderate malnutrition    [    ] Malnutrition ruled out    [    ] Other________________                                                                                                                  Clinically Undetermined

## 2018-11-05 ENCOUNTER — OFFICE VISIT (OUTPATIENT)
Dept: SURGICAL ONCOLOGY | Facility: CLINIC | Age: 38
End: 2018-11-05
Payer: MEDICAID

## 2018-11-05 ENCOUNTER — TELEPHONE (OUTPATIENT)
Dept: NEUROLOGY | Facility: CLINIC | Age: 38
End: 2018-11-05

## 2018-11-05 ENCOUNTER — TELEPHONE (OUTPATIENT)
Dept: SURGERY | Facility: CLINIC | Age: 38
End: 2018-11-05

## 2018-11-05 VITALS
TEMPERATURE: 99 F | SYSTOLIC BLOOD PRESSURE: 102 MMHG | WEIGHT: 120.13 LBS | DIASTOLIC BLOOD PRESSURE: 61 MMHG | HEIGHT: 67 IN | BODY MASS INDEX: 18.85 KG/M2 | HEART RATE: 79 BPM

## 2018-11-05 DIAGNOSIS — R19.7 DIARRHEA, UNSPECIFIED TYPE: Primary | ICD-10-CM

## 2018-11-05 DIAGNOSIS — K91.89: Primary | ICD-10-CM

## 2018-11-05 DIAGNOSIS — Z09 POSTOP CHECK: Primary | ICD-10-CM

## 2018-11-05 PROCEDURE — 99024 POSTOP FOLLOW-UP VISIT: CPT | Mod: ,,, | Performed by: SURGERY

## 2018-11-05 PROCEDURE — 99999 PR PBB SHADOW E&M-EST. PATIENT-LVL III: CPT | Mod: PBBFAC,,, | Performed by: SURGERY

## 2018-11-05 PROCEDURE — 99213 OFFICE O/P EST LOW 20 MIN: CPT | Mod: PBBFAC,PN | Performed by: SURGERY

## 2018-11-05 RX ORDER — CHOLESTYRAMINE 4 G/9G
POWDER, FOR SUSPENSION ORAL
Refills: 2 | COMMUNITY
Start: 2018-10-15 | End: 2018-11-21

## 2018-11-05 NOTE — TELEPHONE ENCOUNTER
Spoke with the pt and pt's wife to schedule EMG. Reports the pt is recovering from extensive gastrointestinal surgery and will contact our office once he is fully recovered to determine if he needs the test.

## 2018-11-05 NOTE — PROGRESS NOTES
S/p duodenal resection and revision DJ with Jtube placement 10/19/18.  Using Jfeeds 18 hrs per day at 60 ccs/hr and reports loose stools every couple of hours while on Jfeeds, which stops when the Jfeeds are off  Also eating and reports pretty good appetite    Vitals:    11/05/18 1059   BP: 102/61   Pulse: 79   Temp: 99 °F (37.2 °C)     Wt 120.2 lbs  Looks good  Abd: soft, non-distended, incision looks good with a small superficial blister to the right of the incision.     Rec:  Stool sample for C diff  Decrease Jfeeds to 40 ccs per hr and let us know if diarrhea not improved  RTC 10-14 days with labs incl CBC, CMP, prealbumin

## 2018-11-06 ENCOUNTER — CLINICAL SUPPORT (OUTPATIENT)
Dept: REHABILITATION | Facility: HOSPITAL | Age: 38
End: 2018-11-06
Attending: SURGERY
Payer: MEDICAID

## 2018-11-06 ENCOUNTER — LAB VISIT (OUTPATIENT)
Dept: LAB | Facility: HOSPITAL | Age: 38
End: 2018-11-06
Attending: SURGERY
Payer: MEDICAID

## 2018-11-06 DIAGNOSIS — R26.89 IMPAIRMENT OF BALANCE: ICD-10-CM

## 2018-11-06 DIAGNOSIS — R53.1 WEAKNESS: Primary | ICD-10-CM

## 2018-11-06 DIAGNOSIS — Z74.09 DECREASED FUNCTIONAL MOBILITY AND ENDURANCE: ICD-10-CM

## 2018-11-06 DIAGNOSIS — R19.7 DIARRHEA, UNSPECIFIED TYPE: ICD-10-CM

## 2018-11-06 LAB
C DIFF GDH STL QL: NEGATIVE
C DIFF TOX A+B STL QL IA: NEGATIVE

## 2018-11-06 PROCEDURE — 97140 MANUAL THERAPY 1/> REGIONS: CPT | Mod: PO | Performed by: PHYSICAL THERAPIST

## 2018-11-06 PROCEDURE — 87324 CLOSTRIDIUM AG IA: CPT

## 2018-11-06 PROCEDURE — 97163 PT EVAL HIGH COMPLEX 45 MIN: CPT | Mod: PO | Performed by: PHYSICAL THERAPIST

## 2018-11-06 NOTE — PLAN OF CARE
[facility]  Physical Therapy Initial Evaluation    Name: Casper Fenton  Clinic Number: [id number]    Therapy Diagnosis:   Encounter Diagnoses   Name Primary?    Weakness Yes    Decreased functional mobility and endurance     Impairment of balance      Physician: Yovany Terry MD    Physician Orders: PT Eval and Treat   Medical Diagnosis: blind duodenal loop syndrome  Evaluation Date: 11/6/2018  Authorization: med necessity (12/31/18)  Plan of Care Certification Period: 2/1/18    Today's Date: 11/6/2018  Visit #: 1/24  Time In: 0905  Time Out: 1000  Total Billable Time: 55 minutes    Precautions: Standard, asymptomatic Afib, malnutrition  DOS: 10/19/18 s/p Extensive lysis of adhesions (1.5 hours); resection of   duodenal segments 3 and 4; revision of duodenojejunostomy; cholecystectomy;   Witzel jejunostomy.    Subjective     Date of onset: 5-6 years ago    History of current condition - [facility] reports: started working as a fire department in 2008. He needed to get in better shape, purposefully losing 20-30#. Then the weight started falling off. Cancer has been ruled out. He is recovering from open abdominal surgery 3 weeks ago. He is recovering easier than he expected. He has random L ant shoulder/arm pit with pain with horiz add. He also is having more increased LBP with flexion, like when drying feet. J tube feedings with suspected associated diarrhea. He doesn't feel as bloated at 40cc Jtube feedings last night. Eating by mouth is going well without bloating/pain.     He had not been trying diets to see if food triggers may be related to his chronic diarrhea. Pt takes weight at home daily. He was 122 last week and is 118 today.     He had no sensation of Afib. He passed his pre-op stress test. The cardiologist said it was likely an isolated incidence and nothing to be concerned about. Previous duodenojejunostomy patent (1981)       Past Medical History:   Diagnosis Date     Allergy     Asthma     Intestinal atresia     Duodenal or proximal jejunal s/p duodeno-jejunal bypass?    Pancreas divisum     Short bowel syndrome        Casper Fenton  has a past surgical history that includes Small intestine surgery; wisdom tooth removal; EXCISION, duodenal resection (N/A, 10/19/2018); REVISION, jejunostomy (N/A, 10/19/2018); CHOLECYSTECTOMY (10/19/2018); CREATION, JEJUNOSTOMY (10/19/2018); ESOPHAGOGASTRODUODENOSCOPY (EGD) (N/A, 8/27/2018); DILATATION (Bilateral, 8/27/2018); BIOPSY LIVER AND ULTRASOUND (N/A, 3/1/2016); and EGD with push enteroscopy (N/A, 12/7/2015).    Casper has a current medication list which includes the following prescription(s): acetaminophen, aspirin, b complx-c-folic-zinc-copper-e, cholestyramine, cholestyramine-aspartame, metoprolol succinate, multivitamin-min-iron-fa-vit k, oxycodone-acetaminophen, pantoprazole, polyethylene glycol, testosterone cypionate, and thiamine.    Review of patient's allergies indicates:   Allergen Reactions    Kiwi (actinidia chinensis)         IMAGING  XRAY ABDOMEN: There is a right-sided drain.  No obstruction, ileus, or perforation seen    CT: 1. Imaging finding which are most compatible with the provided history of congenital duodenal atresia.  There is dilatation of the duodenal bulb and 2nd portion of the duodenum, and there is saccular dilatation of the duodenum distal to the area of atresia.  There is a possible duodenal-jejunal bypass present within the right upper quadrant of the abdomen, best appreciated on series 2, image 60 this was also suspected at the time of the patient's prior small bowel follow-through examination.  2. Malrotation of the ascending colon as detailed above.  3. Apparent areas of concentric mucosal thickening within the cecum, ascending colon, and proximal descending colon/splenic flexure.  Inflammatory bowel disease, especially Crohn's disease, cannot be excluded.  4. Normal appearance of the  "appendix.    MRI CERVICAL: Mild cervical spondylosis without high-grade spinal canal or foraminal narrowing.  No cord signal abnormality identified.    MRI BRAIN: 1. No acute intracranial abnormality.  2. Left maxillary sinus disease.    FL UPPER GI:   1. In this patient with known history of a duodenal bulb/jejunal bypass there is preferential filling of a significantly dilated duodenal loop which in turn fills up the dilated jejunum.  Significant stasis of the contrast is seen within this dilated loop.  No extravasation is noted.  The and not stenotic site itself was not clearly evaluated on this study.  2. Distal small bowel and the terminal ileum are unremarkable.    MRI MRCP: 1. No evidence of acute pancreatitis  2. Pancreatic divisum, a normal variant  3. No intra or extrahepatic biliary ductal dilatation    HIDA: Nonvisualization of the gallbladder after 130 min of imaging, concerning for acute versus chronic cholecystitis.      Prior Therapy: Previous treatment included medical management. No PT this calendar year.    Social History: lives with their family in a single story home with no steps to enter and has J tube feeding pump, FWW AD/medical equipment. 4 kids at home ages 10, 8, 6, 2.   Occupation: Pt is not working.   Prior Level of Function: Pt was independent with all ADLs and iADLs without pain, ambulating without pain or deviation, driving independently. He has not tried driving since surgery.       Pain:  Current 0/10, worst 6/10, best 0/10   Location: abdomen, back, front R shoulder  Description: achy, sharp  Aggravating Factors: bending forward, standing up straight  Easing Factors: sitting in recliner  [address]  Pts goals: "get back to normal"    Objective     Weight: 118 lb    VITALS - R UE  TIME HR BP COMMENT   0930 73 109/84 Pre 6MWT    0936 70 118/80 Post 6MWT  RPE 12   0941 83 111/90    0943 74 121/82            Posture: WFL    Gait: ataxic gait, unsteady, with frequent LOB to R. Pt " independent in recovery without near fall or fall.       6MWT: 1050 feet  No AD, RPE 12    TINETTI TEST  Assistive Device  Normally Used: No  Assistive Device During Testing: No     BALANCE ASSESSMENT  1. Sitting Balance: 1 - steady, safe  2. Rises from Chair: 1 - able to rise using arms to help  3. Attempts to Rise: 2 - able to rise, requires one attempt  4. Immediate Standing Balance: 1 - steady but uses walker or other support  5. Standing Balance: 1 - steady but with wide stance and uses support  6. Nudged: 1 - staggers, grabs, catches self  7. Eyes Closed: 0 - unsteady  8. Turning 360 Degrees: 1 - continuous steps and 0 - unsteady (grabs, staggers)  9.Sitting Down: 1 - uses arms or not as smooth motion     Balance Score: 9/16    GAIT ASSESSMENT  10. Indication of Gait: 1 - no hesitancy  11. Step Length and Height: 1 - step through right and 1 - step through left  12. Foot Clearance: 1 - right foot clears the floor and 1 - left foot clears the floor  13. Step Symmetry: 1 - right and left step length appear equal  14. Step Continuity: 1 - steps appear continuous  15. Path: 1 - mild/moderate deviations or uses AD  16. Trunk: 1 - no sway, flexes knees/back/uses arms to balance  17. Walking Time: 0 - heels apart     Gait Score: 9/12   TOTAL SCORE: 18 /28     24+ = low fall risk   19-23 = moderate fall risk  <19 = high fall risk      DGI: did not assess d/t RPE reported with 6MWT      CMS Impairment/Limitation/Restriction for FOTO TRUNK Survey    Therapist reviewed FOTO scores for Casper Fenton on 11/6/2018.   FOTO documents entered into [employer] - see Media section.    Limitation Score: 43%  Category: Mobility    Current : CK = at least 40% but < 60% impaired, limited or restricted  Goal: CJ = at least 20% but < 40% impaired, limited or restricted         TREATMENT     Treatment Time In: 0950  Treatment Time Out: 1000  Total Treatment time separate from Evaluation: 10 minutes    Casper received the following manual  therapy techniques to improve joint and tissue restrictions and pain management for 10 minutes including:  Scar mobilization and MFR entire abdomen      EDUCATION  Education provided:   - Instructed on general anatomy/physiology  - Role of therapy in multi-disciplinary team  - Instructed in purpose of physical therapy and the benefits/risks of treatment  - Risks of refusing treatment  - POC and goals for therapy  No spiritual or educational barriers to learning provided    Written Home Exercises Provided:   Pt was not provided with a written copy of exercises to perform at home. Casper demonstrated good  understanding of the education provided.     See EMR under Patient Instructions for exercises provided 11/6/2018.    Assessment     Casper is a 37 y.o. male referred to outpatient Physical Therapy with a medical diagnosis of blind duodenal loop syndrome. Pt presents with appropriate post-op healing with no signs of infection, however profound scar tissue restriction of abdomen appreciated. He has very low endurance with questionable cardiac response after 6MWT. Will continue to use Andrew RPE throughout therapy to ensure his effort level stays around 12 to not expend more energy than his nutritional intake as weight gain needs to be priority. Will continue monitoring vitals and weight to ensure therapeutic exercise remains appropriate. He has generalized deconditioning and weakness. His gait appears ataxic with unsteadiness and excessively widened COLEMAN observed. Intermittent LOB to the R>L with fatigue appreciated. Pt did not require assist to correct today, however close SBA maintained to ensure pt safety. Will continue to monitor BMs. If they remain Webb Stool Chart type 7 despite Jtube feeding changes per physician, will coordinate with physician about employing FODMAP diet to assess for food triggers, especially considering his significant small bowel resection.     Pt prognosis is Good.   Pt will benefit from  skilled outpatient Physical Therapy to address the deficits stated above and in the chart below, provide pt/family education, and to maximize pt's level of independence.     Plan of care discussed with patient: Yes  Pt's spiritual, cultural and educational needs considered and patient is agreeable to the plan of care and goals as stated below:     Anticipated Barriers for therapy: none      Medical Necessity is demonstrated by the following  History  Co-morbidities and personal factors that may impact the plan of care Co-morbidities:   short bowel syndrome, pancreas divisum, intestinal atresia, asthma, malnutrition    Personal Factors:   no deficits     high   Examination  Body Structures and Functions, activity limitations and participation restrictions that may impact the plan of care Body Regions/Systems/Functions:              1) Pain limiting function   2) Decreased ROM   3) Weakness   4) Improper joint mobility   5) Decreased motor control/coordination   6) Decreased balance   7) Difficulty navigating stairs   8) Difficulty ambulating    Activity Limitations:  Community distances    Participation Restrictions:  Caring for children, home chores, yard work         high   Clinical Presentation unstable clinical presentation with unpredictable characteristics   Weight greatly varying  high   Decision Making/ Complexity Score: high       GOALS  Short Term Goals:  6 weeks  1. Pt to perform 10 sit<>stands in 30 Second Sit<>Stand Test for improved functional strength and endurance.   2, Pt to be a low falls risk on Tinetti Balance Test for improved falls risk.  3. Pt to demonstrate independence with abdominal scar mobilizations with closed incision for improve post-op healing.   4. Pt will tolerate HEP to improve impairments and independence with ADL's.     Long Term Goals: 12 weeks  1. Pt will report at least CJ on FOTO TRUNK to demonstrate a decrease in disability and improvement in independence with functional  activities.   2. Pt to be a low falls risk on DGI for improved falls risk with higher functional activity.   3. Pt to amb 1500 feet during 6MWT with no unsteadiness for improved endurance and community mobility.   4. Pt will be I with HEP for self-management of symptoms.     Plan     Certification Period: 11/6/2018 to 2/1/19    Outpatient Physical Therapy 2 times weekly for 12 weeks to include the following interventions: patient education, HEP, therapeutic exercises, neuromuscular re-education, therapeutic activity, manual therapy and gait training, and modalities PRN to achieve established goals.     Lay Hernández, PT

## 2018-11-06 NOTE — PROGRESS NOTES
The CK results are high, but much lower than they were before.  The study looking for antibodies against muscle was normal.  Further workup still needed.  The patient was notified by portal.  He will need a follow up appointment.

## 2018-11-07 ENCOUNTER — TELEPHONE (OUTPATIENT)
Dept: SURGERY | Facility: CLINIC | Age: 38
End: 2018-11-07

## 2018-11-08 ENCOUNTER — CLINICAL SUPPORT (OUTPATIENT)
Dept: REHABILITATION | Facility: HOSPITAL | Age: 38
End: 2018-11-08
Attending: SURGERY
Payer: MEDICAID

## 2018-11-08 DIAGNOSIS — Z74.09 DECREASED FUNCTIONAL MOBILITY AND ENDURANCE: ICD-10-CM

## 2018-11-08 DIAGNOSIS — R53.1 WEAKNESS: ICD-10-CM

## 2018-11-08 DIAGNOSIS — R26.89 IMPAIRMENT OF BALANCE: ICD-10-CM

## 2018-11-08 PROCEDURE — 97110 THERAPEUTIC EXERCISES: CPT | Mod: PO

## 2018-11-08 NOTE — PROGRESS NOTES
"  Physical Therapy Daily Treatment Note     Name: Casper Fenton  Clinic Number: [id number]    Therapy Diagnosis:   Encounter Diagnoses   Name Primary?    Decreased functional mobility and endurance     Impairment of balance     Weakness      Physician: Yovany Terry MD    Visit Date: 11/8/2018  Physician Orders: PT Eval and Treat   Medical Diagnosis: blind duodenal loop syndrome  Evaluation Date: 11/6/2018  Authorization: med necessity (12/31/18)  Plan of Care Certification Period: 2/1/18     Today's Date: 11/6/2018  Visit #: 2/24  Time In: 1110  Time Out: 1200  Total Billable Time: 50 minutes     Precautions: Standard, asymptomatic Afib, malnutrition  DOS: 10/19/18 s/p Extensive lysis of adhesions (1.5 hours); resection of   duodenal segments 3 and 4; revision of duodenojejunostomy; cholecystectomy;   Witzel jejunostomy.    Subjective     Pt reports: " I'm feeling sae blah today." Pt reports feeling more weak and tired than normal today.  Pt reports he is not taking his vitals at home.  Pt goes back to the surgeon on the 21st.  Pt reports he is still having sharp pain in the armpit region when he tights up the muscles in that area.  Pt reports he checked his weight this morning and he thinks it was 118.  Pt reports he got a supplement that is a powder mix which is supposed to help with weight gain.  He was compliant with home exercise program.  Response to previous treatment: felt good but tired, went home and took a nap  Functional change: none reported    Pain: 0/10  Location: bilateral     Objective     RPE to stay around 12     VITALS - R UE  TIME HR BP COMMENT   11:16 93 106/83 Pre treatment    11:33 68 107/87 Mid treatment   12:00 72 111/99 Post treatment                            Casper received therapeutic exercises to develop strength, endurance, ROM, posture and core stabilization for 50 minutes including:    TA set with deep breathing x 3 minutes - cues to breath out when pushing down  LAQ 2 x " 10    O2 stats after exercises 100% with HR 74    HS curls with YTB x 15  Standing hip extension x 10  Standing hip abduction x 10       RPE after three exercises = 10    Sit to stand x 10 - RPE= 10  Standing rows with YTB x 10  Standing shoulder extension with YTB x 10- RPE= 10    Bridges x 10  Supine hip abduction with red theraband x 10- RPE=10      Home Exercises Provided and Patient Education Provided     Education provided:   - keeping all activity no higher than RPE of 12  - taking breaks between exercises  - deep breathing and exhaling with exertion    Written Home Exercises Provided: yes.  Exercises were reviewed and Casper was able to demonstrate them prior to the end of the session.  Casper demonstrated good  understanding of the education provided.   * Epic was not working and exercises could not be printed but LAQ, supine hip abduction with theraband, and standing hip extension and abduction were reviewed for HEP    Assessment     Pt tolerated treatment fairly well.  Pt with elevated diastolic pressure towards the end of treatment session.  Pt with good O2 stats throughout treatment and reported his RPE at the highest, a 10.  Will try to progress within tolerance.  Pt will continue to benefit from skilled outpatient physical therapy to address the deficits listed in the problem list box on initial evaluation, provide pt/family education and to maximize pt's level of independence in the home and community environment.     Pt's spiritual, cultural and educational needs considered and pt agreeable to plan of care and goals.    GOALS  Short Term Goals:  6 weeks  1. Pt to perform 10 sit<>stands in 30 Second Sit<>Stand Test for improved functional strength and endurance.   2, Pt to be a low falls risk on Tinetti Balance Test for improved falls risk.  3. Pt to demonstrate independence with abdominal scar mobilizations with closed incision for improve post-op healing.   4. Pt will tolerate HEP to improve  impairments and independence with ADL's.      Long Term Goals: 12 weeks  1. Pt will report at least CJ on FOTO TRUNK to demonstrate a decrease in disability and improvement in independence with functional activities.   2. Pt to be a low falls risk on DGI for improved falls risk with higher functional activity.   3. Pt to amb 1500 feet during 6MWT with no unsteadiness for improved endurance and community mobility.   4. Pt will be I with HEP for self-management of symptoms.    Plan     Continue with POC

## 2018-11-08 NOTE — PATIENT INSTRUCTIONS
Home Exercise Program: 11/6/2018    Walking program: 5 min at a time, 4-6 times per day    Abdominal massage: scar tissue

## 2018-11-14 ENCOUNTER — CLINICAL SUPPORT (OUTPATIENT)
Dept: REHABILITATION | Facility: HOSPITAL | Age: 38
End: 2018-11-14
Attending: SURGERY
Payer: MEDICAID

## 2018-11-14 DIAGNOSIS — R53.1 WEAKNESS: ICD-10-CM

## 2018-11-14 DIAGNOSIS — R26.89 IMPAIRMENT OF BALANCE: ICD-10-CM

## 2018-11-14 DIAGNOSIS — Z74.09 DECREASED FUNCTIONAL MOBILITY AND ENDURANCE: ICD-10-CM

## 2018-11-14 PROCEDURE — 97110 THERAPEUTIC EXERCISES: CPT | Mod: PO

## 2018-11-14 NOTE — PATIENT INSTRUCTIONS
Straight Leg Raise        Bend one leg. Raise other leg with knee locked. Exhale and tighten thigh muscles while raising leg. Repeat with other leg.  Repeat _20___ times. Do _1___ sessions per day.     [email or web address]     Copyright © [employer]. All rights reserved.

## 2018-11-14 NOTE — PROGRESS NOTES
"  Physical Therapy Daily Treatment Note     Name: Casper Fenton  Clinic Number: [id number]    Therapy Diagnosis:   Encounter Diagnoses   Name Primary?    Decreased functional mobility and endurance     Impairment of balance     Weakness      Physician: Yovany Terry MD    Visit Date: 11/14/2018  Physician Orders: PT Eval and Treat   Medical Diagnosis: blind duodenal loop syndrome  Evaluation Date: 11/6/2018  Authorization: med necessity (12/31/18)  Plan of Care Certification Period: 2/1/18     Today's Date: 11/6/2018  Visit #: 3/24  Time In: 1100  Time Out: 1200  Total Billable Time: 50 minutes     Precautions: Standard, asymptomatic Afib, malnutrition  DOS: 10/19/18 s/p Extensive lysis of adhesions (1.5 hours); resection of   duodenal segments 3 and 4; revision of duodenojejunostomy; cholecystectomy;   Witzel jejunostomy.    Subjective     Pt reports: Having some leakage from the incision.  Pt reports this is not all of the time.  Pt reports the color is a " goldish clear."   Pt reports his doctor is aware of the discharge and said as long as he doesn't have any other symptoms, he is okay.  Pt reports he is feeling a little better than he did the previous treatment.  Pt reports his battery broke on his scale so he hasn't weighed himself in about 4 days.  He was compliant with home exercise program.  Response to previous treatment: no soreness or increase in fatigue  Functional change: none reported    Pain: 0/10  Location: bilateral     Objective     RPE to stay around 12     VITALS - R UE  TIME HR BP COMMENT   11:04 60 107/82 Pre treatment    11:13 79 114/80 After the bike   11:31 77 94/81 After SLR   11:36 71 107/85 After 5 min break and water   11:43 * taken manually  122/92     11:53  Taken manually   116/84              Casper received therapeutic exercises to develop strength, endurance, ROM, posture and core stabilization for 55 minutes including:    Recumbent bike x 6 min no resistance  LAQ 2 x 10  HS " curls with YTB x 20  Sit to stand x 20 - RPE= 3  Bridges x 20  SLR x 15  Standing rows with YTB x 20  Standing shoulder extension with YTB x 20  Standing hip extension x 10  Standing hip abduction x 10             Home Exercises Provided and Patient Education Provided     Education provided:   - keeping all activity no higher than RPE of 12  - taking breaks between exercises  - deep breathing and exhaling with exertion    Written Home Exercises Provided: yes.  Exercises were reviewed and Casper was able to demonstrate them prior to the end of the session.  Casper demonstrated good  understanding of the education provided.     Assessment      Pt tolerated treatment well with no increase in symptoms.  Pt's blood pressure became low during treatment but after water, patient was able to recover quickly.  Pt's pressure will be taken manually in future sessions.  Pt will continue to benefit from skilled outpatient physical therapy to address the deficits listed in the problem list box on initial evaluation, provide pt/family education and to maximize pt's level of independence in the home and community environment.     Pt's spiritual, cultural and educational needs considered and pt agreeable to plan of care and goals.    GOALS  Short Term Goals:  6 weeks  1. Pt to perform 10 sit<>stands in 30 Second Sit<>Stand Test for improved functional strength and endurance.   2, Pt to be a low falls risk on Tinetti Balance Test for improved falls risk.  3. Pt to demonstrate independence with abdominal scar mobilizations with closed incision for improve post-op healing.   4. Pt will tolerate HEP to improve impairments and independence with ADL's.      Long Term Goals: 12 weeks  1. Pt will report at least CJ on FOTO TRUNK to demonstrate a decrease in disability and improvement in independence with functional activities.   2. Pt to be a low falls risk on DGI for improved falls risk with higher functional activity.   3. Pt to amb 1500  feet during 6MWT with no unsteadiness for improved endurance and community mobility.   4. Pt will be I with HEP for self-management of symptoms.    Plan     Continue with POC

## 2018-11-16 ENCOUNTER — CLINICAL SUPPORT (OUTPATIENT)
Dept: REHABILITATION | Facility: HOSPITAL | Age: 38
End: 2018-11-16
Payer: MEDICAID

## 2018-11-16 DIAGNOSIS — R26.89 IMPAIRMENT OF BALANCE: ICD-10-CM

## 2018-11-16 DIAGNOSIS — Z74.09 DECREASED FUNCTIONAL MOBILITY AND ENDURANCE: ICD-10-CM

## 2018-11-16 DIAGNOSIS — R53.1 WEAKNESS: ICD-10-CM

## 2018-11-16 PROCEDURE — 97110 THERAPEUTIC EXERCISES: CPT | Mod: PO | Performed by: PHYSICAL THERAPIST

## 2018-11-16 NOTE — PROGRESS NOTES
Physical Therapy Daily Treatment Note     Name: Casper Fenton  Clinic Number: [id number]    Therapy Diagnosis:   Encounter Diagnoses   Name Primary?    Decreased functional mobility and endurance     Impairment of balance     Weakness      Physician: Yovany Terry MD    Physician Orders: PT Eval and Treat   Medical Diagnosis: blind duodenal loop syndrome  Evaluation Date: 11/6/2018  Authorization: med necessity (12/31/18)  Plan of Care Certification Period: 2/1/18     Today's Date: 11/16/2018  Visit #: 4/24  Time In: 1100  Time Out: 1200  Total Billable Time: 45 minutes     Precautions: Standard, asymptomatic Afib, malnutrition  DOS: 10/19/18 s/p Extensive lysis of adhesions (1.5 hours); resection of   duodenal segments 3 and 4; revision of duodenojejunostomy; cholecystectomy;   Witzel jejunostomy.    Subjective     Pt reports: he is doing ok. His wife is getting batteries for his scale today. His doctor as increased his Jtube feedings again. He was compliant with home exercise program.  Response to previous treatment: no soreness or increase in fatigue  Functional change: none reported    Pain: 0/10  Location: bilateral     Objective     RPE to stay around 12    VITALS - R UE, all taken manually  TIME HR BP COMMENT   11:04 /90 Pre treatment    11:38 /82 After mat exercises - supine   11:39 /90 sitting   11:36 /85 After 5 min break and water   11:55  118/90 After standing exercises             TREATMENT  Casper received therapeutic exercises to develop strength, endurance, ROM, posture and core stabilization for 45 minutes including:    Recumbent bike x 6 min no resistance   LAQ 1# 2 x 10  HS curls with YTB x 20  Sit to stand x 20 - RPE= 3  Bridges x 20  SLR 1# x 20  Standing rows with YTB x 20  Standing shoulder extension with YTB x 20  Standing hip extension 2 x 10  Standing hip abduction 2 x 10       Casper received the following manual therapy techniques to improve joint and  tissue restrictions and pain management for 15 minutes including:  Scar mobilization / MFR with direct techniques      EDUCATION  Education provided:   - Progress towards goals   - Role of therapy   - Activity modification  - keeping all activity no higher than RPE of 12  - taking breaks between exercises  - deep breathing and exhaling with exertion    Written Home Exercises Provided:   Pt was not provided with a written copy of exercises to perform at home. Casper demonstrated good  understanding of the education provided.     See EMR under Patient Instructions for exercises provided prior visit.    Assessment      Pt tolerated treatment well with no increase in symptoms. Still uncertain of pt's BP response with therapy. Unable to determine pt's tolerance with PT as he has not been weighing himself at home, as pt remains asymptomatic with all comorbidities. Scar tissue remains profoundly restricted. Once all scabs have resolved, will begin IASTM to abdomen.     Pt prognosis is Good.  Pt will continue to benefit from skilled outpatient physical therapy to address the deficits listed in the problem list box on initial evaluation, provide pt/family education and to maximize pt's level of independence in the home and community environment.     Plan of care discussed with patient: Yes  Pt's spiritual, cultural and educational needs considered and patient is agreeable to the plan of care and goals as stated below:      Anticipated Barriers for therapy: none     GOALS  Short Term Goals:  6 weeks  1. Pt to perform 10 sit<>stands in 30 Second Sit<>Stand Test for improved functional strength and endurance. Progressing, not met   2, Pt to be a low falls risk on Tinetti Balance Test for improved falls risk. Progressing, not met   3. Pt to demonstrate independence with abdominal scar mobilizations with closed incision for improve post-op healing. Progressing, not met   4. Pt will tolerate HEP to improve impairments and  independence with ADL's. Progressing, not met      Long Term Goals: 12 weeks  1. Pt will report at least CJ on FOTO TRUNK to demonstrate a decrease in disability and improvement in independence with functional activities. Progressing, not met   2. Pt to be a low falls risk on DGI for improved falls risk with higher functional activity. Progressing, not met   3. Pt to amb 1500 feet during 6MWT with no unsteadiness for improved endurance and community mobility. Progressing, not met   4. Pt will be I with HEP for self-management of symptoms. Progressing, not met     Plan     Continue with established POC working toward PT goals.      Lay Hernández, PT

## 2018-11-19 ENCOUNTER — CLINICAL SUPPORT (OUTPATIENT)
Dept: REHABILITATION | Facility: HOSPITAL | Age: 38
End: 2018-11-19
Attending: SURGERY
Payer: MEDICAID

## 2018-11-19 DIAGNOSIS — Z74.09 DECREASED FUNCTIONAL MOBILITY AND ENDURANCE: ICD-10-CM

## 2018-11-19 DIAGNOSIS — R26.89 IMPAIRMENT OF BALANCE: ICD-10-CM

## 2018-11-19 DIAGNOSIS — R53.1 WEAKNESS: ICD-10-CM

## 2018-11-19 PROCEDURE — 97110 THERAPEUTIC EXERCISES: CPT | Mod: PO

## 2018-11-19 NOTE — PROGRESS NOTES
Physical Therapy Daily Treatment Note     Name: Casper Fenton  Clinic Number: [id number]    Therapy Diagnosis:   Encounter Diagnoses   Name Primary?    Decreased functional mobility and endurance     Impairment of balance     Weakness      Physician: Yovany Terry MD    Physician Orders: PT Eval and Treat   Medical Diagnosis: blind duodenal loop syndrome  Evaluation Date: 11/6/2018  Authorization: med necessity (12/31/18)  Plan of Care Certification Period: 2/1/18     Today's Date: 11/19/2018  Visit #: 5/24  Time In: 3:05 pm  Time Out: 4:00 pm  Total Billable Time: 55  minutes     Precautions: Standard, asymptomatic Afib, malnutrition  DOS: 10/19/18 s/p Extensive lysis of adhesions (1.5 hours); resection of   duodenal segments 3 and 4; revision of duodenojejunostomy; cholecystectomy;   Witzel jejunostomy.    Subjective     Pt reports:Weighed himself this morning and he was 122. . He was compliant with home exercise program.  Response to previous treatment: no soreness or increase in fatigue  Functional change: none reported    Pain: 0/10  Location: bilateral     Objective     RPE to stay around 12    VITALS - R UE, all taken manually  TIME HR BP COMMENT   3:10 /70    3:29 /80    3:45 /88    4:00 /86                    TREATMENT  Casper received therapeutic exercises to develop strength, endurance, ROM, posture and core stabilization for 55 minutes including:    Recumbent bike x 7 min no resistance   LAQ 1# 3 x 10  HS curls with RTB x 20  Sit to stand x 20 - RPE= 3  Bridges x 20  SLR 1# x 20  Standing rows with RTB x 20  Standing shoulder extension with RTB x 20  Standing hip extension 2 x 10  Standing hip abduction 2 x 10       Not performed:  Casper received the following manual therapy techniques to improve joint and tissue restrictions and pain management for 15 minutes including:  Scar mobilization / MFR with direct techniques      EDUCATION  Education provided:   - Progress  towards goals   - Role of therapy   - Activity modification  - keeping all activity no higher than RPE of 12  - taking breaks between exercises  - deep breathing and exhaling with exertion    Written Home Exercises Provided:   Pt was not provided with a written copy of exercises to perform at home. Casper demonstrated good  understanding of the education provided.     See EMR under Patient Instructions for exercises provided prior visit.    Assessment      Pt tolerated treatment well.  Will continue to monitor pressure and perform manual therapy as tolerated.  Pt will continue to benefit from skilled outpatient physical therapy to address the deficits listed in the problem list box on initial evaluation, provide pt/family education and to maximize pt's level of independence in the home and community environment.     Plan of care discussed with patient: Yes  Pt's spiritual, cultural and educational needs considered and patient is agreeable to the plan of care and goals as stated below:      Anticipated Barriers for therapy: none     GOALS  Short Term Goals:  6 weeks  1. Pt to perform 10 sit<>stands in 30 Second Sit<>Stand Test for improved functional strength and endurance. Progressing, not met   2, Pt to be a low falls risk on Tinetti Balance Test for improved falls risk. Progressing, not met   3. Pt to demonstrate independence with abdominal scar mobilizations with closed incision for improve post-op healing. Progressing, not met   4. Pt will tolerate HEP to improve impairments and independence with ADL's. Progressing, not met      Long Term Goals: 12 weeks  1. Pt will report at least CJ on FOTO TRUNK to demonstrate a decrease in disability and improvement in independence with functional activities. Progressing, not met   2. Pt to be a low falls risk on DGI for improved falls risk with higher functional activity. Progressing, not met   3. Pt to amb 1500 feet during 6MWT with no unsteadiness for improved endurance  and community mobility. Progressing, not met   4. Pt will be I with HEP for self-management of symptoms. Progressing, not met     Plan     Continue with established POC working toward PT goals.

## 2018-11-21 ENCOUNTER — OFFICE VISIT (OUTPATIENT)
Dept: SURGICAL ONCOLOGY | Facility: CLINIC | Age: 38
End: 2018-11-21
Payer: MEDICAID

## 2018-11-21 ENCOUNTER — LAB VISIT (OUTPATIENT)
Dept: LAB | Facility: HOSPITAL | Age: 38
End: 2018-11-21
Attending: SURGERY
Payer: MEDICAID

## 2018-11-21 VITALS
WEIGHT: 131.63 LBS | TEMPERATURE: 98 F | SYSTOLIC BLOOD PRESSURE: 120 MMHG | HEIGHT: 67 IN | DIASTOLIC BLOOD PRESSURE: 71 MMHG | BODY MASS INDEX: 20.66 KG/M2 | HEART RATE: 99 BPM

## 2018-11-21 DIAGNOSIS — Z09 POSTOP CHECK: Primary | ICD-10-CM

## 2018-11-21 DIAGNOSIS — K91.89: ICD-10-CM

## 2018-11-21 DIAGNOSIS — K91.89: Primary | Chronic | ICD-10-CM

## 2018-11-21 LAB
ALBUMIN SERPL BCP-MCNC: 4.1 G/DL
ALP SERPL-CCNC: 62 U/L
ALT SERPL W/O P-5'-P-CCNC: 49 U/L
ANION GAP SERPL CALC-SCNC: 11 MMOL/L
AST SERPL-CCNC: 36 U/L
BASOPHILS # BLD AUTO: 0.09 K/UL
BASOPHILS NFR BLD: 0.9 %
BILIRUB SERPL-MCNC: 0.7 MG/DL
BUN SERPL-MCNC: 15 MG/DL
CALCIUM SERPL-MCNC: 8.9 MG/DL
CHLORIDE SERPL-SCNC: 104 MMOL/L
CO2 SERPL-SCNC: 24 MMOL/L
CREAT SERPL-MCNC: 0.53 MG/DL
DIFFERENTIAL METHOD: ABNORMAL
EOSINOPHIL # BLD AUTO: 1.9 K/UL
EOSINOPHIL NFR BLD: 19.4 %
ERYTHROCYTE [DISTWIDTH] IN BLOOD BY AUTOMATED COUNT: 12.7 %
EST. GFR  (AFRICAN AMERICAN): >60 ML/MIN/1.73 M^2
EST. GFR  (NON AFRICAN AMERICAN): >60 ML/MIN/1.73 M^2
GLUCOSE SERPL-MCNC: 85 MG/DL
HCT VFR BLD AUTO: 40.3 %
HGB BLD-MCNC: 12.9 G/DL
LYMPHOCYTES # BLD AUTO: 2.3 K/UL
LYMPHOCYTES NFR BLD: 24 %
MCH RBC QN AUTO: 28.9 PG
MCHC RBC AUTO-ENTMCNC: 32 G/DL
MCV RBC AUTO: 90 FL
MONOCYTES # BLD AUTO: 0.6 K/UL
MONOCYTES NFR BLD: 6.3 %
NEUTROPHILS # BLD AUTO: 4.8 K/UL
NEUTROPHILS NFR BLD: 49.4 %
NRBC BLD-RTO: 0 /100 WBC
PLATELET # BLD AUTO: 285 K/UL
PMV BLD AUTO: 9.7 FL
POTASSIUM SERPL-SCNC: 4.5 MMOL/L
PREALB SERPL-MCNC: 31 MG/DL
PROT SERPL-MCNC: 6.8 G/DL
RBC # BLD AUTO: 4.47 M/UL
SODIUM SERPL-SCNC: 139 MMOL/L
WBC # BLD AUTO: 9.69 K/UL

## 2018-11-21 PROCEDURE — 99024 POSTOP FOLLOW-UP VISIT: CPT | Mod: ,,, | Performed by: SURGERY

## 2018-11-21 PROCEDURE — 85025 COMPLETE CBC W/AUTO DIFF WBC: CPT | Mod: PN

## 2018-11-21 PROCEDURE — 80053 COMPREHEN METABOLIC PANEL: CPT | Mod: PN

## 2018-11-21 PROCEDURE — 85025 COMPLETE CBC W/AUTO DIFF WBC: CPT

## 2018-11-21 PROCEDURE — 84134 ASSAY OF PREALBUMIN: CPT | Mod: PN

## 2018-11-21 PROCEDURE — 36415 COLL VENOUS BLD VENIPUNCTURE: CPT | Mod: PN

## 2018-11-21 PROCEDURE — 99213 OFFICE O/P EST LOW 20 MIN: CPT | Mod: PBBFAC,PN | Performed by: SURGERY

## 2018-11-21 PROCEDURE — 84134 ASSAY OF PREALBUMIN: CPT

## 2018-11-21 PROCEDURE — 99999 PR PBB SHADOW E&M-EST. PATIENT-LVL III: CPT | Mod: PBBFAC,,, | Performed by: SURGERY

## 2018-11-21 PROCEDURE — 80053 COMPREHEN METABOLIC PANEL: CPT

## 2018-11-21 RX ORDER — POTASSIUM CHLORIDE 750 MG/1
10 TABLET, EXTENDED RELEASE ORAL ONCE
COMMUNITY
End: 2019-01-07

## 2018-11-21 NOTE — PROGRESS NOTES
S/p duodenal resection and revision of DJ 10/19/18    Still using Jfeeds for 12 hours a day (approx 1100 calories). Diarrhea MUCH better. Eating well    Weight up 11 lbs since last visit  Looks good  Abd: soft, incision well healed with small suture sinus at the mid portion.     Imp:   Doing well      Rec: continue Jfeeds for now. RTC 3-4 weeks

## 2018-11-26 ENCOUNTER — CLINICAL SUPPORT (OUTPATIENT)
Dept: REHABILITATION | Facility: HOSPITAL | Age: 38
End: 2018-11-26
Attending: SURGERY
Payer: MEDICAID

## 2018-11-26 DIAGNOSIS — R26.89 IMPAIRMENT OF BALANCE: ICD-10-CM

## 2018-11-26 DIAGNOSIS — Z74.09 DECREASED FUNCTIONAL MOBILITY AND ENDURANCE: ICD-10-CM

## 2018-11-26 DIAGNOSIS — R53.1 WEAKNESS: ICD-10-CM

## 2018-11-26 PROCEDURE — 97110 THERAPEUTIC EXERCISES: CPT | Mod: PO

## 2018-11-26 PROCEDURE — 97140 MANUAL THERAPY 1/> REGIONS: CPT | Mod: PO

## 2018-11-26 NOTE — PROGRESS NOTES
Physical Therapy Daily Treatment Note     Name: Casper Fenton  Clinic Number: [id number]    Therapy Diagnosis:   Encounter Diagnoses   Name Primary?    Decreased functional mobility and endurance     Impairment of balance     Weakness      Physician: Yovany Terry MD    Physician Orders: PT Eval and Treat   Medical Diagnosis: blind duodenal loop syndrome  Evaluation Date: 11/6/2018  Authorization: med necessity (12/31/18)  Plan of Care Certification Period: 2/1/18     Today's Date: 11/26/2018  Visit #: 6/24  Time In: 2:04 pm  Time Out: 3:00 pm  Total Billable Time: 27  minutes     Precautions: Standard, asymptomatic Afib, malnutrition  DOS: 10/19/18 s/p Extensive lysis of adhesions (1.5 hours); resection of   duodenal segments 3 and 4; revision of duodenojejunostomy; cholecystectomy;   Witzel jejunostomy.    Subjective     Pt reports: He saw the doctor and he said to continue with the tube feeding for another few weeks. He was compliant with home exercise program.  Response to previous treatment: no soreness or increase in fatigue  Functional change: none reported    Pain: 0/10  Location: bilateral     Objective     RPE to stay around 12    VITALS - R UE, all taken manually  TIME HR BP COMMENT   2:15 /80    2:34  124/88    3:02  125/85 After UBE                         TREATMENT  Casper received therapeutic exercises to develop strength, endurance, ROM, posture and core stabilization for 55 minutes including:    Recumbent bike x 8 min no resistance   LAQ 1# 3 x 10  HS curls with RTB x 20  Sit to stand x 20 - RPE= 3  Bridges x 20  SLR 1# x 20  Standing rows with RTB x 30  Standing shoulder extension with RTB x 30  Standing hip extension 2 x 10 #1  Standing hip abduction 2 x 10  #1   Shuttle #37 3 x 10  UBE 2'/2'      Not performed:  Casper received the following manual therapy techniques to improve joint and tissue restrictions and pain management for 15 minutes including:  Scar mobilization / MFR with  direct techniques      EDUCATION  Education provided:   - Progress towards goals   - Role of therapy   - Activity modification  - keeping all activity no higher than RPE of 12  - taking breaks between exercises  - deep breathing and exhaling with exertion    Written Home Exercises Provided:   Pt was not provided with a written copy of exercises to perform at home. Casper demonstrated good  understanding of the education provided.     See EMR under Patient Instructions for exercises provided prior visit.    Assessment      Pt tolerated treatment very well. Pt able to progress resistance and perform machines today with no issues. Will continue to monitor pressure and perform manual therapy as tolerated.  Pt will continue to benefit from skilled outpatient physical therapy to address the deficits listed in the problem list box on initial evaluation, provide pt/family education and to maximize pt's level of independence in the home and community environment.     Plan of care discussed with patient: Yes  Pt's spiritual, cultural and educational needs considered and patient is agreeable to the plan of care and goals as stated below:      Anticipated Barriers for therapy: none     GOALS  Short Term Goals:  6 weeks  1. Pt to perform 10 sit<>stands in 30 Second Sit<>Stand Test for improved functional strength and endurance. Progressing, not met   2, Pt to be a low falls risk on Tinetti Balance Test for improved falls risk. Progressing, not met   3. Pt to demonstrate independence with abdominal scar mobilizations with closed incision for improve post-op healing. Progressing, not met   4. Pt will tolerate HEP to improve impairments and independence with ADL's. Progressing, not met      Long Term Goals: 12 weeks  1. Pt will report at least CJ on FOTO TRUNK to demonstrate a decrease in disability and improvement in independence with functional activities. Progressing, not met   2. Pt to be a low falls risk on DGI for improved  falls risk with higher functional activity. Progressing, not met   3. Pt to amb 1500 feet during 6MWT with no unsteadiness for improved endurance and community mobility. Progressing, not met   4. Pt will be I with HEP for self-management of symptoms. Progressing, not met     Plan     Continue with established POC working toward PT goals.

## 2018-11-30 ENCOUNTER — CLINICAL SUPPORT (OUTPATIENT)
Dept: REHABILITATION | Facility: HOSPITAL | Age: 38
End: 2018-11-30
Attending: SURGERY
Payer: MEDICAID

## 2018-11-30 DIAGNOSIS — Z74.09 DECREASED FUNCTIONAL MOBILITY AND ENDURANCE: ICD-10-CM

## 2018-11-30 DIAGNOSIS — R53.1 WEAKNESS: ICD-10-CM

## 2018-11-30 DIAGNOSIS — R26.89 IMPAIRMENT OF BALANCE: ICD-10-CM

## 2018-11-30 PROCEDURE — 97110 THERAPEUTIC EXERCISES: CPT | Mod: PO | Performed by: PHYSICAL THERAPIST

## 2018-11-30 PROCEDURE — 97140 MANUAL THERAPY 1/> REGIONS: CPT | Mod: PO | Performed by: PHYSICAL THERAPIST

## 2018-11-30 NOTE — PROGRESS NOTES
Physical Therapy Daily Treatment Note     Name: Casper Fenton  Clinic Number: [id number]    Therapy Diagnosis:   Encounter Diagnoses   Name Primary?    Decreased functional mobility and endurance     Impairment of balance     Weakness      Physician: Yovany Terry MD    Physician Orders: PT Eval and Treat   Medical Diagnosis: blind duodenal loop syndrome  Evaluation Date: 11/6/2018  Authorization: med necessity (12/31/18)  Plan of Care Certification Period: 2/1/18     Today's Date: 11/30/2018  Visit #: 7/24  Time In: 1000  Time Out: 1105  Total Billable Time: 65 minutes     Precautions: Standard, asymptomatic Afib, malnutrition  DOS: 10/19/18 s/p Extensive lysis of adhesions (1.5 hours); resection of   duodenal segments 3 and 4; revision of duodenojejunostomy; cholecystectomy;   Witzel jejunostomy.    Subjective     Pt reports: He isn't feeling well today after watching the [employer] lose last night. Weight is 126# this AM. He states his wife has been rubbing his scar some. Pt asks if he can start exercising with weights at home to build arm/leg strength. His neighbor has a body gym machine that he has free access to use.   Response to previous treatment: no soreness or increase in fatigue  Functional change: none reported    Pain: 0/10  Location: bilateral     Objective     RPE to stay around 12    VITALS - R UE, all taken manually  TIME HR BP COMMENT   1012 --- 118/86 After UBE   1050  122/86 After mat TE   1105  118/80 After recumbent                          TREATMENT   Casper received therapeutic exercises to develop strength, endurance, ROM, posture and core stabilization for 25 minutes including:  Recumbent bike seat 8 L1 x 10 min   LAQ 2# x20  SLR 2# x 20  Bridges yellow Tband x 20  HS curls with RTB x 20 - not performed   Sit to stand x 20 - not performed   Standing rows with RTB x 30 - not performed   Standing shoulder extension with RTB x 30 - not performed   Standing hip extension 2 x 10 #1 - not  performed   Standing hip abduction 2 x 10  #1 - not performed   Shuttle #37 3 x 10 - not performed   UBE 3'/3'    Casper received the following manual therapy techniques to improve joint and tissue restrictions and pain management for 30 minutes including:  Scar mobilization / MFR with direct and bimanual techniques      EDUCATION  Education provided:   - Progress towards goals   - Role of therapy   - Activity modification  - keeping all activity and exercise no higher than RPE of 12  - taking breaks between exercises  - deep breathing and exhaling with exertion  - scar mobilization constantly throughout day  - Home exercise program with low weight, high reps (starting at x20 reps, increasing to x30 reps prior to increasing resistance)    Written Home Exercises Provided:   Pt was not provided with a written copy of exercises to perform at home. Casper demonstrated good  understanding of the education provided.     See EMR under Patient Instructions for exercises provided prior visit.    Assessment      Pt tolerated treatment very well today with no reported adverse affects. Increased time spent on MFR and scar mobilization d/t pt admitting to non-compliance with this at home. Petechiae noted of most superior inch of vertical midline scar without other sign of skin compromise. Scar tissue remains profoundly adhered, leaving pt at risk of long term decreased trunk and limb AROM. Unable to complete full exercise program today because of time constraints. BP seems to be stabilizing between the last couple of visits.     Pt will continue to benefit from skilled outpatient physical therapy to address the deficits listed in the problem list box on initial evaluation, provide pt/family education and to maximize pt's level of independence in the home and community environment.     Plan of care discussed with patient: Yes  Pt's spiritual, cultural and educational needs considered and patient is agreeable to the plan of care and  goals as stated below:      Anticipated Barriers for therapy: none     GOALS  Short Term Goals:  6 weeks  1. Pt to perform 10 sit<>stands in 30 Second Sit<>Stand Test for improved functional strength and endurance. Progressing, not met   2, Pt to be a low falls risk on Tinetti Balance Test for improved falls risk. Progressing, not met   3. Pt to demonstrate independence with abdominal scar mobilizations with closed incision for improve post-op healing. Progressing, not met   4. Pt will tolerate HEP to improve impairments and independence with ADL's. Progressing, not met      Long Term Goals: 12 weeks  1. Pt will report at least CJ on FOTO TRUNK to demonstrate a decrease in disability and improvement in independence with functional activities. Progressing, not met   2. Pt to be a low falls risk on DGI for improved falls risk with higher functional activity. Progressing, not met   3. Pt to amb 1500 feet during 6MWT with no unsteadiness for improved endurance and community mobility. Progressing, not met   4. Pt will be I with HEP for self-management of symptoms. Progressing, not met     Plan     Continue with established POC working toward PT goals.

## 2018-12-03 ENCOUNTER — CLINICAL SUPPORT (OUTPATIENT)
Dept: REHABILITATION | Facility: HOSPITAL | Age: 38
End: 2018-12-03
Attending: SURGERY
Payer: MEDICAID

## 2018-12-03 DIAGNOSIS — Z74.09 DECREASED FUNCTIONAL MOBILITY AND ENDURANCE: ICD-10-CM

## 2018-12-03 DIAGNOSIS — R26.89 IMPAIRMENT OF BALANCE: ICD-10-CM

## 2018-12-03 DIAGNOSIS — R53.1 WEAKNESS: ICD-10-CM

## 2018-12-03 PROCEDURE — 97110 THERAPEUTIC EXERCISES: CPT | Mod: PO

## 2018-12-03 NOTE — PROGRESS NOTES
Physical Therapy Daily Treatment Note     Name: Casper Fenton  Clinic Number: [id number]    Therapy Diagnosis:   Encounter Diagnoses   Name Primary?    Decreased functional mobility and endurance     Impairment of balance     Weakness      Physician: Yovany Terry MD    Physician Orders: PT Eval and Treat   Medical Diagnosis: blind duodenal loop syndrome  Evaluation Date: 11/6/2018  Authorization: med necessity (12/31/18)  Plan of Care Certification Period: 2/1/18     Today's Date: 12/3/2018  Visit #: 8/24    Time In: 1020 (patient late)  Time Out: 1105  Total Billable Time: 45 minutes     Precautions: Standard, asymptomatic Afib, malnutrition  DOS: 10/19/18 s/p Extensive lysis of adhesions (1.5 hours); resection of   duodenal segments 3 and 4; revision of duodenojejunostomy; cholecystectomy;   Witzel jejunostomy.    Subjective     Pt reports: that his abdomen is a little sore this morning. Patient states he was hooked up to his feeding tube last night and his recliner flipped which caused a pulling near the feeding tube site. Patient states the skin around the feeding tube remained intact and he just felt a pulling sensation.   Response to previous treatment: no soreness or increase in fatigue  Functional change: none reported    Pain: 2/10 (soreness around J tube site)   Location: bilateral     Objective     RPE to stay around 12    VITALS - R UE, all taken manually  TIME HR BP COMMENT   1035 --- 112/78 After recumbent   1050 -- 110/78 After standing exercise   1105 -- 118/82 End of treatment                         TREATMENT  Casper received therapeutic exercises to develop strength, endurance, ROM, posture and core stabilization for 45 minutes including:  Recumbent bike seat 8 L1 x 10 min   LAQ 2# x20  SLR 2# x 20  Bridges red Tband x 20  HS curls with RTB x 20 - not performed   Sit to stand x 20 - not performed   Standing rows with RTB x 30 - not performed   Standing shoulder extension with RTB x 30 -  not performed   Standing hip extension 2 x 10 x 2#  Standing hip abduction 2 x 10 x 2#  Shuttle #37 3 x 10  UBE 3'/3'    Casper received the following manual therapy techniques to improve joint and tissue restrictions and pain management for 00 minutes including:  Scar mobilization / MFR with direct and bimanual techniques    EDUCATION  Education provided:   - Progress towards goals   - Role of therapy   - Activity modification  - keeping all activity and exercise no higher than RPE of 12  - taking breaks between exercises  - deep breathing and exhaling with exertion  - scar mobilization constantly throughout day  - Home exercise program with low weight, high reps (starting at x20 reps, increasing to x30 reps prior to increasing resistance)    Written Home Exercises Provided:   Pt was not provided with a written copy of exercises to perform at home. Casper demonstrated good  understanding of the education provided.     See EMR under Patient Instructions for exercises provided prior visit.    Assessment      Manual therapy and some therapeutic exercise deferred today due to patient's late arrival. Patient's blood pressure remain WNL throughout treatment session today. Cueing needed with standing gluteal strengthening exercises to avoid trunk compensation with fair correction achieved. Patient instructed to continue scar mobilization at home but this will continued to be addressed during therapy sessions as well due to poor tissue mobility.      Pt will continue to benefit from skilled outpatient physical therapy to address the deficits listed in the problem list box on initial evaluation, provide pt/family education and to maximize pt's level of independence in the home and community environment.     Plan of care discussed with patient: Yes  Pt's spiritual, cultural and educational needs considered and patient is agreeable to the plan of care and goals as stated below:      Anticipated Barriers for therapy:  none     GOALS  Short Term Goals:  6 weeks  1. Pt to perform 10 sit<>stands in 30 Second Sit<>Stand Test for improved functional strength and endurance. Progressing, not met   2, Pt to be a low falls risk on Tinetti Balance Test for improved falls risk. Progressing, not met   3. Pt to demonstrate independence with abdominal scar mobilizations with closed incision for improve post-op healing. Progressing, not met   4. Pt will tolerate HEP to improve impairments and independence with ADL's. Progressing, not met      Long Term Goals: 12 weeks  1. Pt will report at least CJ on FOTO TRUNK to demonstrate a decrease in disability and improvement in independence with functional activities. Progressing, not met   2. Pt to be a low falls risk on DGI for improved falls risk with higher functional activity. Progressing, not met   3. Pt to amb 1500 feet during 6MWT with no unsteadiness for improved endurance and community mobility. Progressing, not met   4. Pt will be I with HEP for self-management of symptoms. Progressing, not met     Plan     Continue with established POC working toward PT goals.    Mabel Duff, PTA

## 2018-12-06 ENCOUNTER — CLINICAL SUPPORT (OUTPATIENT)
Dept: REHABILITATION | Facility: HOSPITAL | Age: 38
End: 2018-12-06
Attending: SURGERY
Payer: MEDICAID

## 2018-12-06 DIAGNOSIS — R26.89 IMPAIRMENT OF BALANCE: ICD-10-CM

## 2018-12-06 DIAGNOSIS — R53.1 WEAKNESS: ICD-10-CM

## 2018-12-06 DIAGNOSIS — Z74.09 DECREASED FUNCTIONAL MOBILITY AND ENDURANCE: ICD-10-CM

## 2018-12-06 PROCEDURE — 97110 THERAPEUTIC EXERCISES: CPT | Mod: PO

## 2018-12-06 NOTE — PROGRESS NOTES
Physical Therapy Daily Treatment Note     Name: Casper Fenton  Clinic Number: [id number]    Therapy Diagnosis:   Encounter Diagnoses   Name Primary?    Decreased functional mobility and endurance     Impairment of balance     Weakness      Physician: Yovany Terry MD    Physician Orders: PT Eval and Treat   Medical Diagnosis: blind duodenal loop syndrome  Evaluation Date: 11/6/2018  Authorization: med necessity (12/31/18)  Plan of Care Certification Period: 2/1/18     Today's Date: 12/6/2018  Visit #: 9/24    Time In: 1010 (late arrival)  Time Out: 1100  Total Billable Time: 50 minutes     Precautions: Standard, asymptomatic Afib, malnutrition  DOS: 10/19/18 s/p Extensive lysis of adhesions (1.5 hours); resection of   duodenal segments 3 and 4; revision of duodenojejunostomy; cholecystectomy;   Witzel jejunostomy.    Subjective     Pt reports: Scheduled to see the doctor again on the 21st.  Response to previous treatment: no soreness or increase in fatigue  Functional change: none reported    Pain: 2/10 (soreness around J tube site)   Location: bilateral     Objective     RPE to stay around 12    VITALS - R UE, all taken manually  TIME HR BP COMMENT   1015 --- 117/88 Beginning of treatment   1036 -- 114/78 After bike   10:48 -- 117/83 After UBE                         TREATMENT  Casper received therapeutic exercises to develop strength, endurance, ROM, posture and core stabilization for 42 minutes including:  Recumbent bike seat 8 L3 x 8min   LAQ 2# x20- not performed  SLR 2# x 20- not performed  Bridges red Tband x 20- not performed  HS curls with RTB x 20 - not performed   Sit to stand x 20 - not performed   Standing rows with RTB x 30 - not performed   Standing shoulder extension with RTB x 30 - not performed   Standing hip extension 3 x 10 x 2#  Standing hip abduction 3 x 10 x 2#  Shuttle #50 3 x 10  UBE 3'/3' level 2    Casper received the following manual therapy techniques to improve joint and tissue  restrictions and pain management for 8 minutes including:  Scar mobilization / MFR with direct and bimanual techniques    EDUCATION  Education provided:   - Progress towards goals   - Role of therapy   - Activity modification  - keeping all activity and exercise no higher than RPE of 12  - taking breaks between exercises  - deep breathing and exhaling with exertion  - scar mobilization constantly throughout day  - Home exercise program with low weight, high reps (starting at x20 reps, increasing to x30 reps prior to increasing resistance)    Written Home Exercises Provided:   Pt was not provided with a written copy of exercises to perform at home. Casper demonstrated good  understanding of the education provided.     See EMR under Patient Instructions for exercises provided prior visit.    Assessment      Manual therapy and some therapeutic exercise deferred today due to patient's late arrival. Pt able to tolerate weight progression and reps following treatment.      Pt will continue to benefit from skilled outpatient physical therapy to address the deficits listed in the problem list box on initial evaluation, provide pt/family education and to maximize pt's level of independence in the home and community environment.     Plan of care discussed with patient: Yes  Pt's spiritual, cultural and educational needs considered and patient is agreeable to the plan of care and goals as stated below:      Anticipated Barriers for therapy: none     GOALS  Short Term Goals:  6 weeks  1. Pt to perform 10 sit<>stands in 30 Second Sit<>Stand Test for improved functional strength and endurance. Progressing, not met   2, Pt to be a low falls risk on Tinetti Balance Test for improved falls risk. Progressing, not met   3. Pt to demonstrate independence with abdominal scar mobilizations with closed incision for improve post-op healing. Progressing, not met   4. Pt will tolerate HEP to improve impairments and independence with ADL's.  Progressing, not met      Long Term Goals: 12 weeks  1. Pt will report at least CJ on FOTO TRUNK to demonstrate a decrease in disability and improvement in independence with functional activities. Progressing, not met   2. Pt to be a low falls risk on DGI for improved falls risk with higher functional activity. Progressing, not met   3. Pt to amb 1500 feet during 6MWT with no unsteadiness for improved endurance and community mobility. Progressing, not met   4. Pt will be I with HEP for self-management of symptoms. Progressing, not met     Plan     Continue with established POC working toward PT goals.

## 2018-12-14 ENCOUNTER — CLINICAL SUPPORT (OUTPATIENT)
Dept: REHABILITATION | Facility: HOSPITAL | Age: 38
End: 2018-12-14
Payer: MEDICAID

## 2018-12-14 DIAGNOSIS — R53.1 WEAKNESS: ICD-10-CM

## 2018-12-14 DIAGNOSIS — Z74.09 DECREASED FUNCTIONAL MOBILITY AND ENDURANCE: ICD-10-CM

## 2018-12-14 DIAGNOSIS — R26.89 IMPAIRMENT OF BALANCE: ICD-10-CM

## 2018-12-14 PROCEDURE — 97110 THERAPEUTIC EXERCISES: CPT | Mod: PO | Performed by: PHYSICAL THERAPIST

## 2018-12-14 NOTE — PLAN OF CARE
[facility]  Physical Therapy Reassessment    Name: Casper Fenton  [facility] Number: [id number]    Therapy Diagnosis:   Encounter Diagnoses   Name Primary?    Decreased functional mobility and endurance     Impairment of balance     Weakness      Physician: Yovany Terry MD    Physician Orders: PT Eval and Treat   Medical Diagnosis: blind duodenal loop syndrome  Evaluation Date: 12/14/2018  Authorization: med necessity (12/31/18)  Plan of Care Certification Period: 2/1/18    Precautions: Standard, asymptomatic Afib, malnutrition  DOS: 10/19/18 s/p Extensive lysis of adhesions (1.5 hours); resection of   duodenal segments 3 and 4; revision of duodenojejunostomy; cholecystectomy;   Witzel jejunostomy.    Subjective     Pt reports: he is more tired today. He has a child with the flu. He asks if he still has Afib. Weight was 131.8# this AM. He feels that overall PT has helped in gain some strength back, although he still remains significantly weak compared to his PLOF. He is eager to return to full regular exercise but voices poor understanding of modification for current condition and to ensure he doesn't exert too much energy as to inhibit further weight gain.   Response to previous treatment: no soreness or increase in fatigue    Pain: 2/10 (soreness around J tube site)   Location: bilateral     Objective     Gait: widened COLEMAN, improved stability however intermittent altered step length bilaterally    30 Sec Sit<>Stand Test: DNA    6MWT: 960 feet (3 full laps around gym)  No AD, RPE 12, no rest break    TINETTI TEST  Assistive Device  Normally Used: No  Assistive Device During Testing: No     BALANCE ASSESSMENT  1. Sitting Balance: 1 - steady, safe  2. Rises from Chair: 2 - able to rise without using arms to help  3. Attempts to Rise: 2 - able to rise, requires one attempt  4. Immediate Standing Balance: 2 - steady without walker or other support  5. Standing Balance: 1 - steady but  with wide stance and uses support  6. Nudged: 2 - steady  7. Eyes Closed: 1 - steady  8. Turning 360 Degrees: 1 - continuous steps and 1 - steady  9.Sitting Down: 2 - safe, smooth motion     Balance Score: 15 /16    GAIT ASSESSMENT  10. Indication of Gait: 1 - no hesitancy  11. Step Length and Height: 1 - step through right and 1 - step through left  12. Foot Clearance: 1 - right foot clears the floor and 1 - left foot clears the floor  13. Step Symmetry: 1 - right and left step length appear equal  14. Step Continuity: 1 - steps appear continuous  15. Path: 1 - mild/moderate deviations or uses AD  16. Trunk: 2 - no sway, no flexion of knees/back, use of arms, or AD  17. Walking Time: 0 - heels apart     Gait Score: 10/12   TOTAL SCORE: 25 /28     24+ = low fall risk   19-23 = moderate fall risk  <19 = high fall risk      Dynamic Gait Index    1. GAIT LEVEL SURFACE: 2 - mild impairment: walks 20', uses AD, slower speed, mild gait deviations  2. CHANGE IN GAIT SPEED: 2 - mild impairment: is able to change speed but demonstrates mild gait deviations, or unable to achieve a significant change in velocity, or uses an AD  3. GAIT WITH HORIZONTAL HEAD TURNS: 3 - normal: performs head turns smoothly with no change in gait  4. GAIT WITH VERTICAL HEAD TURNS: 3 - normal: performs head turns smoothly with no change in gait  5. GAIT AND PIVOT TURN: 2 - mild impairment: pivots turns safely in >3 seconds and stops with no loss of balance  6. STEP OVER OBSTACLE: 3 - normal: is able to step over the box without changing gait speed, no evidence of imbalance  7. STEP AROUND OBSTACLES: 3 - normal: is able to walk around cones safely without changing gait speed; no evidence of imbalance  8. STEPS: DNA    TOTAL SCORE: 18/21 (14%)    < 20/24 = predictive of falls in the elderly (16%)  > 22/24 = safe ambulator (8%)        CMS Impairment/Limitation/Restriction for FOTO TRUNK Survey    Therapist reviewed FOTO scores for Casper Fenton  on 12/14/2018.   FOTO documents entered into [employer] - see Media section.    Limitation Score: 35%  Category: Mobility    Current : CJ = at least 20% but < 40% impaired, limited or restricted   11/6/18: 43%  Goal: CJ = at least 20% but < 40% impaired, limited or restricted         Assessment     Pt tolerated today's tx well with no visible or reported adverse affects. Pt's BP has been more stable in the past couple of weeks and has demonstrated appropriate change with exercise response. Will no longer monitor BP regularly throughout therapy. Still encouraged pt to f/u with cardiology regarding asymptomatic Afib found pre-op. Pt is progressing through therapy well with improvements in balance, however functional deficits still remain that leave pt at a greater risk for falls. Scar tissue is slowly improving still. Will emphasize pt education on safe exercise progressions with considerations and modifications for his current status in order to increase pt independence for self management upon d/c.     Pt prognosis is Good.   Pt will benefit from skilled outpatient Physical Therapy to address the deficits stated above and in the chart below, provide pt/family education, and to maximize pt's level of independence.     Plan of care discussed with patient: Yes  Pt's spiritual, cultural and educational needs considered and patient is agreeable to the plan of care and goals as stated below:     Anticipated Barriers for therapy: none    GOALS  Short Term Goals:  6 weeks  1. Pt to perform 10 sit<>stands in 30 Second Sit<>Stand Test for improved functional strength and endurance. Progressing, not met   2, Pt to be a low falls risk on Tinetti Balance Test for improved falls risk. MET 12/14/18  3. Pt to demonstrate independence with abdominal scar mobilizations with closed incision for improve post-op healing. MET 12/14/18  4. Pt will tolerate HEP to improve impairments and independence with ADL's. MET 12/14/18     Long Term Goals:  12 weeks  1. Pt will report at least CJ on FOTO TRUNK to demonstrate a decrease in disability and improvement in independence with functional activities. MET 12/14/18  2. Pt to be a low falls risk on DGI for improved falls risk with higher functional activity. Progressing, not met   3. Pt to amb 1500 feet during 6MWT with no unsteadiness for improved endurance and community mobility. Progressing, not met   4. Pt will be I with HEP for self-management of symptoms. Progressing, not met     Plan     Continue with established POC working toward PT goals.      Lay Hernández, PT

## 2018-12-14 NOTE — PROGRESS NOTES
Physical Therapy Daily Treatment Note     Name: Casper Fenton  Clinic Number: [id number]    Therapy Diagnosis:   Encounter Diagnoses   Name Primary?    Decreased functional mobility and endurance     Impairment of balance     Weakness      Physician: Yovany Terry MD    Physician Orders: PT Eval and Treat   Medical Diagnosis: blind duodenal loop syndrome  Evaluation Date: 11/6/2018 (PN 12/14/18)  Authorization: med necessity (12/31/18)  Plan of Care Certification Period: 2/1/18     Today's Date: 12/14/2018  Visit #: 10/24  Time In: 1300  Time Out: 1400  Total Billable Time: 45 minutes     Precautions: Standard, asymptomatic Afib, malnutrition  DOS: 10/19/18 s/p Extensive lysis of adhesions (1.5 hours); resection of   duodenal segments 3 and 4; revision of  duodenojejunostomy; cholecystectomy;   Witzel jejunostomy.    Subjective     SEE REASSESSMENT    Objective     RPE to stay around 12     TREATMENT  Casper received therapeutic exercises to develop strength, endurance, ROM, posture and core stabilization for 42 minutes including:  Recumbent bike seat 8 L3 x 10 min   LAQ 2# x20- not performed  SLR 2# x 20- not performed  Bridges red Tband x 20- not performed  HS curls with RTB x 20 - not performed   Sit to stand x 20 - not performed   Standing rows with blue Tband x 30   Standing shoulder extension, ER, IR with green Tband x 30 ea bilat  Standing hip extension 3 x 10 x 2# - not performed   Standing hip abduction 3 x 10 x 2# - not performed   Shuttle #50 3 x 10  UBE 3'/3' level 2 - not performed     Casper received the following manual therapy techniques to improve joint and tissue restrictions and pain management for 0 minutes including:  Scar mobilization / MFR with direct and bimanual techniques - not performed     EDUCATION  Education provided:   - Progress towards goals   - Role of therapy   - Activity modification  - keeping all activity and exercise no higher than RPE of 12  - taking breaks between  exercises  - deep breathing and exhaling with exertion  - scar mobilization constantly throughout day  - Home exercise program with low weight, high reps (starting at x20 reps, increasing to x30 reps prior to increasing resistance)    Written Home Exercises Provided:   Pt was not provided with a written copy of exercises to perform at home. Casper demonstrated good  understanding of the education provided.     See EMR under Patient Instructions for exercises provided prior visit.    Assessment      SEE REASSESSMENT     Pt will continue to benefit from skilled outpatient physical therapy to address the deficits listed in the problem list box on initial evaluation, provide pt/family education and to maximize pt's level of independence in the home and community environment.     Plan of care discussed with patient: Yes  Pt's spiritual, cultural and educational needs considered and patient is agreeable to the plan of care and goals as stated below:      Anticipated Barriers for therapy: none     GOALS  Short Term Goals:  6 weeks  1. Pt to perform 10 sit<>stands in 30 Second Sit<>Stand Test for improved functional strength and endurance. Progressing, not met   2, Pt to be a low falls risk on Tinetti Balance Test for improved falls risk. MET 12/14/18  3. Pt to demonstrate independence with abdominal scar mobilizations with closed incision for improve post-op healing. MET 12/14/18  4. Pt will tolerate HEP to improve impairments and independence with ADL's. MET 12/14/18     Long Term Goals: 12 weeks  1. Pt will report at least CJ on FOTO TRUNK to demonstrate a decrease in disability and improvement in independence with functional activities. MET 12/14/18  2. Pt to be a low falls risk on DGI for improved falls risk with higher functional activity. Progressing, not met   3. Pt to amb 1500 feet during 6MWT with no unsteadiness for improved endurance and community mobility. Progressing, not met   4. Pt will be I with HEP for  self-management of symptoms. Progressing, not met     Plan     Continue with established POC working toward PT goals.    Lay Hernández, PT

## 2018-12-18 ENCOUNTER — CLINICAL SUPPORT (OUTPATIENT)
Dept: REHABILITATION | Facility: HOSPITAL | Age: 38
End: 2018-12-18
Attending: SURGERY
Payer: MEDICAID

## 2018-12-18 DIAGNOSIS — R26.89 IMPAIRMENT OF BALANCE: ICD-10-CM

## 2018-12-18 DIAGNOSIS — R53.1 WEAKNESS: ICD-10-CM

## 2018-12-18 DIAGNOSIS — Z74.09 DECREASED FUNCTIONAL MOBILITY AND ENDURANCE: ICD-10-CM

## 2018-12-18 PROCEDURE — 97110 THERAPEUTIC EXERCISES: CPT | Mod: PO

## 2018-12-18 NOTE — PROGRESS NOTES
Physical Therapy Daily Treatment Note     Name: Casper Fenton  Clinic Number: [id number]    Therapy Diagnosis:   Encounter Diagnoses   Name Primary?    Decreased functional mobility and endurance     Impairment of balance     Weakness      Physician: Yovany Terry MD    Physician Orders: PT Eval and Treat   Medical Diagnosis: blind duodenal loop syndrome  Evaluation Date: 11/6/2018 (PN 12/14/18)  Authorization: med necessity (12/31/18)  Plan of Care Certification Period: 2/1/18     Today's Date: 12/18/2018  Visit #: 11/24  Time In: 900  Time Out: 1000  Total Billable Time: 60 minutes     Precautions: Standard, asymptomatic Afib, malnutrition  DOS: 10/19/18 s/p Extensive lysis of adhesions (1.5 hours); resection of   duodenal segments 3 and 4; revision of  duodenojejunostomy; cholecystectomy;   Witzel jejunostomy.    Subjective     Pt reports in the past couple of days, it seems like his clothing keeps catching on his stitches surrounding the Jtube and it pulls causing aggravation.  Pt goes back to the doctor tomorrow.  No complaints following the reassessment.    Objective     RPE to stay around 12     TREATMENT  Casper received therapeutic exercises to develop strength, endurance, ROM, posture and core stabilization for 60 minutes including:    Recumbent bike seat 8 L3 x 7 min   LAQ 2# x 30  SLR 2# x 30  Bridges on red theraball x 20  HS curls with RTB x 30  Standing rows with blue Tband x 30   Standing shoulder extension, ER, IR with green Tband x 30 ea bilat  Shuttle #67 3 x 10  Step ups on foam pad x 20 ea LE  UBE 3'/3' level 2 - not performed         Casper received the following manual therapy techniques to improve joint and tissue restrictions and pain management for 0 minutes including:  Scar mobilization / MFR with direct and bimanual techniques - not performed     EDUCATION  Education provided:   - Progress towards goals   - Role of therapy   - Activity modification  - keeping all activity and  exercise no higher than RPE of 12  - taking breaks between exercises  - deep breathing and exhaling with exertion  - scar mobilization constantly throughout day  - Home exercise program with low weight, high reps (starting at x20 reps, increasing to x30 reps prior to increasing resistance)    Written Home Exercises Provided:   Pt was not provided with a written copy of exercises to perform at home. Casper demonstrated good  understanding of the education provided.     See EMR under Patient Instructions for exercises provided prior visit.    Assessment      Pt tolerated today's tx well with no visible affects.  Pt with no complaints of pain from stitches during treatment but just an intermittent aggravation when stitches caught on his clothing.  Pt able to progress reps and resistance today.  Pt with mild instability with foam step ups.  Pt will continue to benefit from skilled outpatient physical therapy to address the deficits listed in the problem list box on initial evaluation, provide pt/family education and to maximize pt's level of independence in the home and community environment.     Plan of care discussed with patient: Yes  Pt's spiritual, cultural and educational needs considered and patient is agreeable to the plan of care and goals as stated below:      Anticipated Barriers for therapy: none     GOALS  Short Term Goals:  6 weeks  1. Pt to perform 10 sit<>stands in 30 Second Sit<>Stand Test for improved functional strength and endurance. Progressing, not met   2, Pt to be a low falls risk on Tinetti Balance Test for improved falls risk. MET 12/14/18  3. Pt to demonstrate independence with abdominal scar mobilizations with closed incision for improve post-op healing. MET 12/14/18  4. Pt will tolerate HEP to improve impairments and independence with ADL's. MET 12/14/18     Long Term Goals: 12 weeks  1. Pt will report at least CJ on FOTO TRUNK to demonstrate a decrease in disability and improvement in  independence with functional activities. MET 12/14/18  2. Pt to be a low falls risk on DGI for improved falls risk with higher functional activity. Progressing, not met   3. Pt to amb 1500 feet during 6MWT with no unsteadiness for improved endurance and community mobility. Progressing, not met   4. Pt will be I with HEP for self-management of symptoms. Progressing, not met     Plan     Continue with established POC working toward PT goals.

## 2018-12-19 ENCOUNTER — LAB VISIT (OUTPATIENT)
Dept: LAB | Facility: HOSPITAL | Age: 38
End: 2018-12-19
Attending: SURGERY
Payer: MEDICAID

## 2018-12-19 ENCOUNTER — OFFICE VISIT (OUTPATIENT)
Dept: SURGICAL ONCOLOGY | Facility: CLINIC | Age: 38
End: 2018-12-19
Payer: MEDICAID

## 2018-12-19 VITALS
BODY MASS INDEX: 21.18 KG/M2 | DIASTOLIC BLOOD PRESSURE: 82 MMHG | HEIGHT: 67 IN | HEART RATE: 70 BPM | WEIGHT: 134.94 LBS | SYSTOLIC BLOOD PRESSURE: 124 MMHG

## 2018-12-19 DIAGNOSIS — K91.89: Chronic | ICD-10-CM

## 2018-12-19 DIAGNOSIS — Z09 POSTOP CHECK: Primary | ICD-10-CM

## 2018-12-19 LAB
ALBUMIN SERPL BCP-MCNC: 4.3 G/DL
ALP SERPL-CCNC: 87 U/L
ALT SERPL W/O P-5'-P-CCNC: 86 U/L
ANION GAP SERPL CALC-SCNC: 10 MMOL/L
AST SERPL-CCNC: 62 U/L
BILIRUB SERPL-MCNC: 1 MG/DL
BUN SERPL-MCNC: 7 MG/DL
CALCIUM SERPL-MCNC: 9.5 MG/DL
CHLORIDE SERPL-SCNC: 104 MMOL/L
CO2 SERPL-SCNC: 29 MMOL/L
CREAT SERPL-MCNC: 0.51 MG/DL
EST. GFR  (AFRICAN AMERICAN): >60 ML/MIN/1.73 M^2
EST. GFR  (NON AFRICAN AMERICAN): >60 ML/MIN/1.73 M^2
GLUCOSE SERPL-MCNC: 91 MG/DL
POTASSIUM SERPL-SCNC: 4.6 MMOL/L
PREALB SERPL-MCNC: 24 MG/DL
PROT SERPL-MCNC: 7.1 G/DL
SODIUM SERPL-SCNC: 143 MMOL/L

## 2018-12-19 PROCEDURE — 36415 COLL VENOUS BLD VENIPUNCTURE: CPT | Mod: PN

## 2018-12-19 PROCEDURE — 99213 OFFICE O/P EST LOW 20 MIN: CPT | Mod: PBBFAC,PN | Performed by: SURGERY

## 2018-12-19 PROCEDURE — 80053 COMPREHEN METABOLIC PANEL: CPT

## 2018-12-19 PROCEDURE — 99999 PR PBB SHADOW E&M-EST. PATIENT-LVL III: CPT | Mod: PBBFAC,,, | Performed by: SURGERY

## 2018-12-19 PROCEDURE — 84134 ASSAY OF PREALBUMIN: CPT | Mod: PN

## 2018-12-19 PROCEDURE — 84134 ASSAY OF PREALBUMIN: CPT

## 2018-12-19 PROCEDURE — 99024 POSTOP FOLLOW-UP VISIT: CPT | Mod: ,,, | Performed by: SURGERY

## 2018-12-19 PROCEDURE — 80053 COMPREHEN METABOLIC PANEL: CPT | Mod: PN

## 2018-12-19 RX ORDER — POTASSIUM CHLORIDE 20 MEQ/1
20 TABLET, EXTENDED RELEASE ORAL DAILY
Refills: 2 | COMMUNITY
Start: 2018-11-26 | End: 2019-07-12

## 2018-12-19 RX ORDER — SUCRALFATE 1 G/1
TABLET ORAL
Refills: 0 | COMMUNITY
Start: 2018-11-26 | End: 2019-07-12

## 2018-12-19 NOTE — PROGRESS NOTES
S/p duodenal resection 10/19/18 for blind loop syndrome due to massive chronic dilation of duodenum causing severe malnutrition.     Weight up 3 lbs since last visit. He stopped his Jfeeds last week. Reports good appetite and diet tolerance. He is also taking a nutritional supplement by mouth.   2-3 soft BMs per day  He says he feels much better now than he did before his surgery.    Vitals:    12/19/18 1033   BP: 124/82   Pulse: 70     Looks great  Chest clear to ausc  Heart: RR&R with no m  Abd: soft, incision well healed, Jtube in good shape    Rec:  Doing well  Leave Jtube in for two more weeks to ensure weight stability

## 2019-01-07 ENCOUNTER — OFFICE VISIT (OUTPATIENT)
Dept: SURGICAL ONCOLOGY | Facility: CLINIC | Age: 39
End: 2019-01-07
Payer: MEDICAID

## 2019-01-07 VITALS
DIASTOLIC BLOOD PRESSURE: 71 MMHG | TEMPERATURE: 99 F | HEART RATE: 66 BPM | BODY MASS INDEX: 22.11 KG/M2 | SYSTOLIC BLOOD PRESSURE: 124 MMHG | WEIGHT: 140.88 LBS | HEIGHT: 67 IN

## 2019-01-07 DIAGNOSIS — Z09 POSTOP CHECK: Primary | ICD-10-CM

## 2019-01-07 PROCEDURE — 99999 PR PBB SHADOW E&M-EST. PATIENT-LVL III: ICD-10-PCS | Mod: PBBFAC,,, | Performed by: SURGERY

## 2019-01-07 PROCEDURE — 99213 OFFICE O/P EST LOW 20 MIN: CPT | Mod: PBBFAC,PN | Performed by: SURGERY

## 2019-01-07 PROCEDURE — 99024 POSTOP FOLLOW-UP VISIT: CPT | Mod: ,,, | Performed by: SURGERY

## 2019-01-07 PROCEDURE — 99024 PR POST-OP FOLLOW-UP VISIT: ICD-10-PCS | Mod: ,,, | Performed by: SURGERY

## 2019-01-07 PROCEDURE — 99999 PR PBB SHADOW E&M-EST. PATIENT-LVL III: CPT | Mod: PBBFAC,,, | Performed by: SURGERY

## 2019-01-07 NOTE — PROGRESS NOTES
Has not used Jtube since before last visit on 12/19  Weight up 6 lbs!  He feels great. His dad is with him and agrees that he is doing better than he has in years.   Abdominal exam unremarkable; incision well healed  Jtube removed    RTC prn    Note to Dr Malhotra

## 2019-01-07 NOTE — LETTER
January 7, 2019        León Malhotra Jr., MD  [address] [facility] [address] [place] [address]             [facility]  [facility] [facility], [address]  [facility] [address]  Phone: [phone]  Fax: [phone]   Patient: Casper Fenton   MR Number: [id number]   YOB: 1980   Date of Visit: 1/7/2019       Dear Dr. Malhotra:    Thank you for referring Casper Fenton to me for evaluation. Attached you will find relevant portions of my assessment and plan of care.    If you have questions, please do not hesitate to call me. I look forward to following Casper Fenton along with you.    Sincerely,      Yovany Terry MD            CC  No Recipients    Enclosure

## 2019-01-17 ENCOUNTER — PATIENT MESSAGE (OUTPATIENT)
Dept: SURGICAL ONCOLOGY | Facility: CLINIC | Age: 39
End: 2019-01-17

## 2019-01-24 ENCOUNTER — DOCUMENTATION ONLY (OUTPATIENT)
Dept: REHABILITATION | Facility: HOSPITAL | Age: 39
End: 2019-01-24

## 2019-01-24 PROBLEM — R26.89 IMPAIRMENT OF BALANCE: Status: RESOLVED | Noted: 2018-11-08 | Resolved: 2019-01-24

## 2019-01-24 PROBLEM — Z74.09 DECREASED FUNCTIONAL MOBILITY AND ENDURANCE: Status: RESOLVED | Noted: 2018-11-08 | Resolved: 2019-01-24

## 2019-01-24 PROBLEM — R53.1 WEAKNESS: Status: RESOLVED | Noted: 2018-08-26 | Resolved: 2019-01-24

## 2019-01-24 NOTE — PROGRESS NOTES
[facility]  Physical Therapy Discharge Summary    Name: Casper Fenton  [facility] Number: [id number]    Therapy Diagnosis:        Encounter Diagnoses   Name Primary?    Decreased functional mobility and endurance      Impairment of balance      Weakness        Physician: Yovany Terry MD     Physician Orders: PT Eval and Treat   Medical Diagnosis: blind duodenal loop syndrome      Date of Last visit: 12/18/18  Total Visits Attended: 11  Cancelled Visits: 3  No Show Visits: 3    Assessment      GOALS   Short Term Goals:  6 weeks  1. Pt to perform 10 sit<>stands in 30 Second Sit<>Stand Test for improved functional strength and endurance. Progressing, not met   2, Pt to be a low falls risk on Tinetti Balance Test for improved falls risk. MET 12/14/18  3. Pt to demonstrate independence with abdominal scar mobilizations with closed incision for improve post-op healing. MET 12/14/18  4. Pt will tolerate HEP to improve impairments and independence with ADL's. MET 12/14/18     Long Term Goals: 12 weeks  1. Pt will report at least CJ on FOTO TRUNK to demonstrate a decrease in disability and improvement in independence with functional activities. MET 12/14/18  2. Pt to be a low falls risk on DGI for improved falls risk with higher functional activity. Progressing, not met   3. Pt to amb 1500 feet during 6MWT with no unsteadiness for improved endurance and community mobility. Progressing, not met   4. Pt will be I with HEP for self-management of symptoms. Progressing, not met     Discharge reason: Patient has not attended therapy since 12/18/18    Plan   This patient is discharged from Physical Therapy    Lay Hernández, PT

## 2019-08-01 ENCOUNTER — TELEPHONE (OUTPATIENT)
Dept: NEUROLOGY | Facility: CLINIC | Age: 39
End: 2019-08-01

## 2019-08-01 NOTE — TELEPHONE ENCOUNTER
----- Message from Radu Forman sent at 8/1/2019 12:30 PM CDT -----  Contact: Wife/Brittaney Quispe called in and stated patient saw Dr. Lee on 9/18/18 for a hospital follow up.  Brittaney stated that patient had to get cleared for other physicians, including cardiology before Dr. Lee could do any test.  Brittaney stated patient just got clearance and wanted to see if patient could be seen again & if she had to get another referral for Medicaid?    Brittaney's call back number is [phone]

## 2019-08-01 NOTE — TELEPHONE ENCOUNTER
Returned pt wife call and scheduled f/u with Dr. Lee; date/time/location confirmed; added to waiting list; verbalized understanding.

## 2019-10-22 ENCOUNTER — TELEPHONE (OUTPATIENT)
Dept: SURGERY | Facility: CLINIC | Age: 39
End: 2019-10-22

## 2019-10-22 NOTE — TELEPHONE ENCOUNTER
----- Message from Bhavani Meade sent at 10/22/2019  2:52 PM CDT -----  Contact: pt [phone]  Pt is returning call.  His weight is not progressing anymore.  Wife has few concerns.

## 2019-10-22 NOTE — TELEPHONE ENCOUNTER
----- Message from Precious Whitmore sent at 10/22/2019 10:33 AM CDT -----  Contact: PTs Wife  Wife called to schedule an appointment for annual  Please schedule for [place] location    Callback: [phone]

## 2020-07-15 NOTE — ANESTHESIA PREPROCEDURE EVALUATION
10/18/2018   [facility]  Anesthesia Pre-Operative Evaluation         Patient Name: Casper Fenton  YOB: 1980  MRN: [id number]    SUBJECTIVE:     Pre-operative evaluation for Procedure(s) (LRB):  EXCISION, duodenal resection (N/A)  REVISION, jejunostomy (N/A)     10/18/2018    Casper Fenton is a 37 y.o. male w/ a significant PMHx of congenital intestinal atresia s/p bypass referred for failure to thrive and dilation of the bypassed duodenum, childhood asthma, and recent dx of Afib. Pt was recently admitted with an episode of chest pain and had elevated troponin to 0.1. He was found to he in Afib with RVR and treated with Lopressor and sent home on ASA 81 mg. During that admission the pt was not worked up for ACS; pt's Afib was attributed to pain. Pt saw Cardiology for pre-op clearance on 10/16/2018, who recommended pt get a NM stress test, final results pending. Pt denies any anginal chest pain during NM stress test yesterday. Pt was still in Afib at Cardiology appt. Pt also has hx of generalized weakness, significant weight loss, numbness and paresthesias of the hands, imbalance, and dysarthria which is currently being worked up by Neurology who feel sxs may be 2/2 to malabsorption issues.       Patient now presents for the above procedure(s).      LDA: None documented.     Prev airway: None documented.    Drips: None documented.       Patient Active Problem List   Diagnosis    Hypogonadism male    Weight loss    Elevated liver function tests    Failure to thrive in adult    Generalized weakness    Elevated CK    Abnormal small bowel motility    Chronic copper deficiency    Riboflavin deficiency    Vitamin D deficiency    Vitamin B6 deficiency    Neuropathy    Blind duodenal loop syndrome    Elevated troponin    Atrial fibrillation with RVR       Review  LOV 5/4/2020 of patient's allergies indicates:   Allergen Reactions    Kiwi (actinidia chinensis)        Current Inpatient Medications:      No current facility-administered medications on file prior to encounter.      Current Outpatient Medications on File Prior to Encounter   Medication Sig Dispense Refill    B complx-C-folic-zinc-copper-E 500 mg-400 mcg- 24 mg-3 mg Tab Take 1 capsule by mouth every 12 (twelve) hours. 60 tablet 11    multivitamin-min-iron-FA-vit K 18 mg iron-400 mcg-25 mcg Tab Take 1 tablet by mouth once daily. 90 tablet 0    pantoprazole (PROTONIX) 40 MG tablet Take 1 tablet (40 mg total) by mouth once daily. 30 tablet 1    testosterone cypionate (DEPOTESTOTERONE CYPIONATE) 200 mg/mL injection Inject 200 mg into the muscle every 14 (fourteen) days.      thiamine 100 MG tablet Take 1 tablet (100 mg total) by mouth once daily.         Past Surgical History:   Procedure Laterality Date    BIOPSY LIVER AND ULTRASOUND N/A 3/1/2016    Performed by [facility] OR [facility]    DILATATION Bilateral 8/27/2018    Performed by León Malhotra Jr., MD at [facility]    EGD with push enteroscopy N/A 12/7/2015    Performed by Randell Todd MD at [facility]    ESOPHAGOGASTRODUODENOSCOPY N/A 8/27/2018    Procedure: ESOPHAGOGASTRODUODENOSCOPY (EGD);  Surgeon: León Malhotra Jr., MD;  Location: [facility];  Service: Endoscopy;  Laterality: N/A;    ESOPHAGOGASTRODUODENOSCOPY (EGD) N/A 8/27/2018    Performed by León Malhotra Jr., MD at [facility]    SMALL INTESTINE SURGERY      1985    wisdom tooth removal         Social History     Socioeconomic History    Marital status:      Spouse name: Not on file    Number of children: 3    Years of education: Not on file    Highest education level: Not on file   Social Needs    Financial resource strain: Not on file    Food insecurity - worry: Not on file    Food insecurity - inability: Not on file    Transportation needs - medical: Not  on file    Transportation needs - non-medical: Not on file   Occupational History    Not on file   Tobacco Use    Smoking status: Former Smoker     Packs/day: 1.00     Years: 5.00     Pack years: 5.00     Types: Cigarettes    Smokeless tobacco: Current User     Types: Chew    Tobacco comment: 1 can chew tobacco daily   Substance and Sexual Activity    Alcohol use: No     Alcohol/week: 0.0 oz    Drug use: No    Sexual activity: Not on file   Other Topics Concern    Not on file   Social History Narrative    Not on file       OBJECTIVE:     Vital Signs Range (Last 24H):         Significant Labs:  Lab Results   Component Value Date    WBC 8.39 10/02/2018    HGB 13.6 (L) 10/02/2018    HCT 42.7 10/02/2018     10/02/2018    CHOL 112 (L) 06/25/2018    TRIG 95 06/25/2018    HDL 23 (L) 06/25/2018    ALT 62 (H) 10/03/2018    AST 48 (H) 10/03/2018     10/17/2018    K 4.7 10/17/2018     10/17/2018    CREATININE 0.7 10/17/2018    BUN 12 10/17/2018    CO2 29 10/17/2018    TSH 2.340 06/25/2018    INR 1.2 09/27/2018    HGBA1C 5.0 08/12/2014       Diagnostic Studies: No relevant studies.    EKG:   10/16/2018  Atrial fibrillation.  Abnormal ECG    2D ECHO:  No results found for this or any previous visit.     09/28/2018  · Left ventricle ejection fraction is low normal at 50%  · Normal LV diastolic function.  · RV systolic function is normal.  · Left atrium is moderately dilated.  · Mitral valve shows mild regurgitation.  · Tricuspid valve shows mild regurgitation.         Stress Test with Myocardial Perfusion ordered, results pending.     ASSESSMENT/PLAN:         Anesthesia Evaluation    I have reviewed the Patient Summary Reports.    I have reviewed the Nursing Notes.   I have reviewed the Medications.     Review of Systems  Anesthesia Hx:  History of prior surgery of interest to airway management or planning: ,Family Hx Of Anesthesia Complications: unknown; adopted.   Denies Personal Hx of Anesthesia  complications.   Social:  Former Smoker, No Alcohol Use    Cardiovascular:   Dysrhythmias atrial fibrillation    Pulmonary:   Asthma    Hepatic/GI:   Liver Disease, (elevated liver enzymes)    Musculoskeletal:  Musculoskeletal Normal    Neurological:   Peripheral Neuropathy    Psych:  Psychiatric Normal           Physical Exam  General:  Thin adult male   Airway/Jaw/Neck:  Airway Findings: Mouth Opening: Normal Tongue: Normal  General Airway Assessment: Adult  Mallampati: II  Improves to I with phonation.  TM Distance: Normal, at least 6 cm  Jaw/Neck Findings:  Neck ROM: Normal ROM     Eyes/Ears/Nose:  EYES/EARS/NOSE FINDINGS: Normal   Dental:  Dental Findings: In tact, Periodontal disease, Mild   Chest/Lungs:  Chest/Lungs Clear    Heart/Vascular:  Heart Findings: Rate: Normal  Rhythm: Irregularly Irregular     Abdomen:  Abdomen Findings: Normal    Musculoskeletal:  Musculoskeletal Findings: Normal   Skin:  Skin Findings: Normal    Mental Status:  Mental Status Findings:  Cooperative, Alert and Oriented         Anesthesia Plan  Type of Anesthesia, risks & benefits discussed:  Anesthesia Type:  general  Patient's Preference:   Intra-op Monitoring Plan: standard ASA monitors  Intra-op Monitoring Plan Comments:   Post Op Pain Control Plan: IV/PO Opioids PRN, per primary service following discharge from PACU and multimodal analgesia  Post Op Pain Control Plan Comments:   Induction:   IV  Beta Blocker:  Patient is on a Beta-Blocker and has received one dose within the past 24 hours (No further documentation required).       Informed Consent: Patient understands risks and agrees with Anesthesia plan.  Questions answered. Anesthesia consent signed with patient.  ASA Score: 2     Day of Surgery Review of History & Physical:    H&P update referred to the surgeon.         Ready For Surgery From Anesthesia Perspective.

## 2021-05-06 ENCOUNTER — PATIENT MESSAGE (OUTPATIENT)
Dept: RESEARCH | Facility: HOSPITAL | Age: 41
End: 2021-05-06

## 2021-11-18 PROBLEM — I48.0 PAROXYSMAL ATRIAL FIBRILLATION: Status: ACTIVE | Noted: 2021-11-18

## 2022-04-25 NOTE — ASSESSMENT & PLAN NOTE
36 yo male s/p duodenal resection with primary anastomosis    May dc ngt later  Start using jtube today  Ambulate  Awaiting bowel function   Discussed below with Dr. Tolliver.  Per Dr. Tolliver client is cleared for scheduled surgery 04/27/22.   Left detailed message on voicemail for client with above, clinic number provided to call back with any questions.   Ev RENDON

## 2022-06-14 PROBLEM — E16.2 HYPOGLYCEMIA: Status: ACTIVE | Noted: 2022-06-14

## 2022-06-14 PROBLEM — I48.0 PAROXYSMAL A-FIB: Status: ACTIVE | Noted: 2022-06-14

## 2022-06-14 PROBLEM — Z91.199 NON-COMPLIANCE: Status: ACTIVE | Noted: 2022-06-14

## 2022-06-14 PROBLEM — U07.1 COVID: Status: ACTIVE | Noted: 2022-06-14

## 2022-06-14 PROBLEM — Z71.89 ACP (ADVANCE CARE PLANNING): Status: ACTIVE | Noted: 2022-06-14

## 2022-06-14 PROBLEM — G45.9 TIA (TRANSIENT ISCHEMIC ATTACK): Status: ACTIVE | Noted: 2022-06-14

## 2022-06-15 ENCOUNTER — TELEPHONE (OUTPATIENT)
Dept: NEUROLOGY | Facility: CLINIC | Age: 42
End: 2022-06-15

## 2022-06-15 NOTE — TELEPHONE ENCOUNTER
----- Message from Lorena Bunch sent at 6/15/2022  4:07 PM CDT -----  Contact: [phone]  Type:  Sooner Appointment Request    Caller is requesting a sooner appointment.  Caller declined first available appointment listed below.  Caller will not accept being placed on the waitlist and is requesting a message be sent to doctor.    Name of Caller: [facility]  When is the first available appointment?  NA  Symptoms: Hospital  FU TIA   Best Call Back Number:  [phone]    Additional Information:  Pls call back and advise  7 day fu discharge 6/15

## 2022-06-16 ENCOUNTER — TELEPHONE (OUTPATIENT)
Dept: OPHTHALMOLOGY | Facility: CLINIC | Age: 42
End: 2022-06-16
Payer: MEDICAID

## 2022-06-16 NOTE — TELEPHONE ENCOUNTER
----- Message from Leonel Torre sent at 6/15/2022  4:35 PM CDT -----  Marquita Barahona sent to CATERINA MCCRARY Staff  Caller: [facility] (Today,  4:13 PM)  [facility] called and stated that the pt need to be seen by Dr. Stevenson. Please call the pt @   [phone]

## 2022-06-29 ENCOUNTER — OFFICE VISIT (OUTPATIENT)
Dept: NEUROLOGY | Facility: CLINIC | Age: 42
End: 2022-06-29
Payer: MEDICAID

## 2022-06-29 VITALS
HEART RATE: 51 BPM | HEIGHT: 67 IN | DIASTOLIC BLOOD PRESSURE: 73 MMHG | SYSTOLIC BLOOD PRESSURE: 114 MMHG | RESPIRATION RATE: 17 BRPM | WEIGHT: 131.19 LBS | BODY MASS INDEX: 20.59 KG/M2

## 2022-06-29 DIAGNOSIS — I48.19 PERSISTENT ATRIAL FIBRILLATION: ICD-10-CM

## 2022-06-29 DIAGNOSIS — G45.9 TIA (TRANSIENT ISCHEMIC ATTACK): Primary | ICD-10-CM

## 2022-06-29 PROCEDURE — 3078F DIAST BP <80 MM HG: CPT | Mod: CPTII,,, | Performed by: NURSE PRACTITIONER

## 2022-06-29 PROCEDURE — 3008F BODY MASS INDEX DOCD: CPT | Mod: CPTII,,, | Performed by: NURSE PRACTITIONER

## 2022-06-29 PROCEDURE — 99215 PR OFFICE/OUTPT VISIT, EST, LEVL V, 40-54 MIN: ICD-10-PCS | Mod: S$PBB,,, | Performed by: NURSE PRACTITIONER

## 2022-06-29 PROCEDURE — 99215 OFFICE O/P EST HI 40 MIN: CPT | Mod: S$PBB,,, | Performed by: NURSE PRACTITIONER

## 2022-06-29 PROCEDURE — 3044F HG A1C LEVEL LT 7.0%: CPT | Mod: CPTII,,, | Performed by: NURSE PRACTITIONER

## 2022-06-29 PROCEDURE — 1160F RVW MEDS BY RX/DR IN RCRD: CPT | Mod: CPTII,,, | Performed by: NURSE PRACTITIONER

## 2022-06-29 PROCEDURE — 1159F MED LIST DOCD IN RCRD: CPT | Mod: CPTII,,, | Performed by: NURSE PRACTITIONER

## 2022-06-29 PROCEDURE — 99214 OFFICE O/P EST MOD 30 MIN: CPT | Mod: PBBFAC,PO | Performed by: NURSE PRACTITIONER

## 2022-06-29 PROCEDURE — 3074F SYST BP LT 130 MM HG: CPT | Mod: CPTII,,, | Performed by: NURSE PRACTITIONER

## 2022-06-29 PROCEDURE — 1159F PR MEDICATION LIST DOCUMENTED IN MEDICAL RECORD: ICD-10-PCS | Mod: CPTII,,, | Performed by: NURSE PRACTITIONER

## 2022-06-29 PROCEDURE — 99999 PR PBB SHADOW E&M-EST. PATIENT-LVL IV: ICD-10-PCS | Mod: PBBFAC,,, | Performed by: NURSE PRACTITIONER

## 2022-06-29 PROCEDURE — 99999 PR PBB SHADOW E&M-EST. PATIENT-LVL IV: CPT | Mod: PBBFAC,,, | Performed by: NURSE PRACTITIONER

## 2022-06-29 PROCEDURE — 3044F PR MOST RECENT HEMOGLOBIN A1C LEVEL <7.0%: ICD-10-PCS | Mod: CPTII,,, | Performed by: NURSE PRACTITIONER

## 2022-06-29 PROCEDURE — 3074F PR MOST RECENT SYSTOLIC BLOOD PRESSURE < 130 MM HG: ICD-10-PCS | Mod: CPTII,,, | Performed by: NURSE PRACTITIONER

## 2022-06-29 PROCEDURE — 1160F PR REVIEW ALL MEDS BY PRESCRIBER/CLIN PHARMACIST DOCUMENTED: ICD-10-PCS | Mod: CPTII,,, | Performed by: NURSE PRACTITIONER

## 2022-06-29 PROCEDURE — 3078F PR MOST RECENT DIASTOLIC BLOOD PRESSURE < 80 MM HG: ICD-10-PCS | Mod: CPTII,,, | Performed by: NURSE PRACTITIONER

## 2022-06-29 PROCEDURE — 3008F PR BODY MASS INDEX (BMI) DOCUMENTED: ICD-10-PCS | Mod: CPTII,,, | Performed by: NURSE PRACTITIONER

## 2022-06-29 NOTE — ASSESSMENT & PLAN NOTE
Patient is a 40 y/o male that presents for hospital follow up.   He reported transient right eye blurry vision and left hand numbness that lasted ~ 45 mins.   CTH neg acute  MRI brain scan neg acute  CUS done by PCP day before admit and reported to be normal   - will request records  Pt with known h/o afib    - cardioversion x 2 in 2019  Pt was not previously consistent with taking ASA  Continue Eliquis BID    Control stroke risk factors:   - BP/DM/Cholesterol management as per PCP   - agree with statin for LDL control    Recommend pt f/u wth cards as directed as well as Neuro ophth for eval    Stroke education provided

## 2022-06-29 NOTE — PATIENT INSTRUCTIONS
"TIA  A transient ischemic attack (TIA) is like a stroke in that it causes the same symptoms as a stroke, but it does not damage the brain. TIAs happen when an artery in the brain gets clogged, or closes off, and then reopens on its own. This can happen if a blood clot forms and then moves away or dissolves.    The symptoms of a TIA are the same as the symptoms of a stroke and can include:  ?Weakness or numbness of the hand, tongue, cheek, face, arm, or leg  ?Trouble speaking normally or at all  ?Trouble seeing clearly with 1 or both eyes  With a stroke, the symptoms are long-lasting, while a TIA goes away quickly    A TIA does not cause permanent damage to the brain like a stroke does. But the symptoms are the same. This can make it hard to tell if a person is having a TIA or a stroke.    Just like a stroke, a TIA can happen when the blood supply to part of the brain is cut off for a short time. This can happen if a blood clot blocks the flow of blood through an artery in the brain and then dissolves or moves away. It can also happen if one of the small arteries in the brain begins to close off from the effects of high blood pressure.    Just think of the word "FAST" Each letter in the word stands for 1 of the signs you should watch for:  ?Face - Does the person's face look uneven or droop on 1 side?  ?Arm - Does the person have weakness or numbness in 1 or both arms? Does 1 arm drift down if the person tries to hold both arms out?  ?Speech - Is the person having trouble speaking? Does his or her speech sound strange?  ?Time - If you notice any of these stroke signs, even if they go away, call for an ambulance (in the [place] and [place], dial 9-1-1). You need to act FAST. The sooner treatment begins, the better the chances of recovery.  Other symptoms can also be signs of a stroke. These include trouble seeing in one or both eyes, trouble walking, and loss of balance or coordination.      TIAs are not usually treated " directly. Instead, treatments are directed at reducing the risk that a person will go on to have a full-blown stroke. To lower your risk of stroke, you should:  ?Take your medicines exactly as directed. Medicines that are especially important in preventing strokes include:  Medicines to lower blood pressure  Medicines called statins, which lower cholesterol  Medicines to prevent blood clots, such as aspirin or blood thinners  Medicines that help to keep your blood sugar as close to normal as possible (if you have diabetes)  ?Make lifestyle changes:  Stop smoking, if you smoke  Get regular exercise (if your doctor says it's safe) for at least 30 minutes a day on most days of the week  Lose weight, if you are overweight  Eat a diet rich in fruits, vegetables, and low-fat dairy products, and low in meats, sweets, and refined grains (such as white bread or white rice)  Eat less salt (sodium)  Limit the amount of alcohol you drink  -If you are a woman, do not drink more than 1 drink a day  -If you are a man, do not drink more than 2 drinks a day

## 2022-06-29 NOTE — PROGRESS NOTES
"  NEUROLOGY  Outpatient Follow Up    [facility]  [address] [facility], [place] [address]  [phone] (office) / [phone] (fax)    Patient Name:  Casper Fenton  :  1980  MR #:  [id number]  Acct #:  [id number]    Date of Neurology Visit: 2022  Name of Provider: ONESIMO Perry    Other Physicians:  Moose Dillard MD (Primary Care Physician); [facility]* (Referring)      Chief Complaint: Hospital Follow Up      History of Present Illness (HPI):  As per Epic chart review  2022  "Mr Fenton is a 40 y/o male presents to ED again today with resolved complaints of lightheadedness and right-sided blurry vision in his right eye. Yesterday he presented and couldn't wait for Head CT due to  issues. He states started at work, with dizziness, changes in right side peripheral vision and L hand weakness.This lasted approximately 30- 45 minutes.  He states that he was looking at a computer screen for a long time prior to the episode. He denies any previous similar episodes. He does have a history of atrial fibrillation and does not take any blood thinners. He states that he takes an aspirin when he remembers. Has not taken in a long time. He states that he was previously on metoprolol but has stopped taking.   PMH includes atrial fibrillation,duodenal jejunal bypass, intestinal atresia, pancreatic divisum, asthmatic   bronchitis, short bowel syndrome, diagnosed with COVID 5 days ago.  He denies recent illness, no fever, chills, no facial droop or slurred speech.  No upper or lower extremity numbness or weakness, presently. No headache, nausea or vomiting.  No chest pain or shortness of breath.    In the ER,  CT head was negative.Vital signs well within normal limits and patient has remained hemodynamically stable since admission. Will order MRI brain. Routine labs revealed hypoglycemia. Rest of labs grossly within acceptable limits.   EKG shows Afib " "controlled rate. Ordered aspirin and metoprolol  Neurology, ENT and Cardiology consulted   Hospital Medicine called for admission.         Hospital Course:   Admitted overnight with R eye blurry vision and numbness of fingertips of L hand.  He has hx of AFib, not compliant with ASA.  He was monitored in tele.  MRI brain reported no acute intracranial abnl.  LDL at goal, HDL 14.  A1c non diabetic range.  Seen in consultation by neurology, who recommended NOAC, stop ASA, add statin and further w/u as an outpt.  He will need to f/u with neuro-ophthalmology, cardiology and neurology as an outpt.  It was discussed importance of compliance with meds.  PT/OT eval for complete TIA eval.  Case was d/w Dr Cobb prior discharge."          Interval Hx 6/29/2022:   Patient is new to me  Patient is here today for hospital follow up. Since discharge he states he has been feeling well. He admits to having right eye blurry vision and left hand numbness on admit. Symptoms lasted about 45 minutes. He did not endorse any extremity weakness. He is now taking Eliquis BID as directed and compliant with all medications.  He was told to follow up with cardiology in 1 month. He will also be following up with Neuro ophth in August.     Of note, patient did have cardioversion's x 2 in 2019. He was noted to have afib while inpatient.                 Past Medical, Surgical, Family & Social History:   Reviewed and updated.    Home Medications:     Current Outpatient Medications:     apixaban (ELIQUIS) 5 mg Tab, Take 1 tablet (5 mg total) by mouth 2 (two) times daily., Disp: 60 tablet, Rfl: 1    metoprolol succinate (TOPROL-XL) 25 MG 24 hr tablet, TAKE 1 TABLET BY MOUTH TWICE A DAY, Disp: 60 tablet, Rfl: 11    rosuvastatin (CRESTOR) 20 MG tablet, Take 1 tablet (20 mg total) by mouth once daily., Disp: 90 tablet, Rfl: 0    Physical Examination:  /73 (BP Location: Left arm, Patient Position: Sitting, BP Method: Medium (Automatic))   Pulse " "(!) 51   Resp 17   Ht 5' 7" (1.702 m)   Wt 59.5 kg (131 lb 2.8 oz)   BMI 20.54 kg/m²     GENERAL:  General appearance: Well, non-toxic appearing.  No apparent distress.  Neck: supple.    MENTAL STATUS:  Alertness, attention span & concentration: normal.  Language: normal.  Orientation to self, place & time:  normal.  Memory, recent & remote: normal.  Fund of knowledge: normal.      SPEECH:  Clear and fluent.  Follows complex commands.    CRANIAL NERVES:  Cranial Nerves II-XII were examined.  II - Visual fields: normal.  III, IV, VI: PERRL, EOMI, No ptosis, No nystagmus.  V - Facial sensation: normal.  VII - Face symmetry & mobility: normal.  VIII - Hearing: normal  IX, X - Palate: mobile & midline.  XI - Shoulder shrug: normal.  XII - Tongue protrusion: normal.      GROSS MOTOR:  Gait & station: non focal  Tone: normal.  Abnormal movements: none.  Finger-nose : normal.  Rapid alternating movements: normal.  Pronator drift: normal      MUSCLE STRENGTH:   Hand grasp:   - right:5/5   - left:5/5    Fascics Atrophy RIGHT    LEFT Atrophy Fascics     5 Deltoids 5       5 Sh.Ext.Rot. 5       5 Sh.Int.Rot. 5       5 Biceps 5       5 Triceps 5       5 Forearm.Pr. 5                5 Iliopsoas flex    5       5 Hip Abduct 5       5 Hip Adduct 5       5 Quads 5       5 Hams 5       5 Dorsiflex 5       5 Plantar Flex 5                REFLEXES:    RIGHT Reflex   LEFT   2 Biceps 2   2 Brachiorad. 2        1 Patellar 1     SENSORY:  Light touch: Normal throughout.             Diagnostic Data Reviewed:   Component      Latest Ref Rng & Units 6/15/2022 6/14/2022   Sodium      136 - 145 mmol/L 139    Potassium      3.5 - 5.1 mmol/L 4.1    Chloride      95 - 110 mmol/L 108    CO2      22 - 31 mmol/L 26    Glucose      70 - 110 mg/dL 91    BUN      9 - 21 mg/dL 10    Creatinine      0.50 - 1.40 mg/dL 0.69    Calcium      8.4 - 10.2 mg/dL 8.2 (L)    PROTEIN TOTAL      6.0 - 8.4 g/dL 6.4    Albumin      3.5 - 5.2 g/dL 3.6    BILIRUBIN " TOTAL      0.2 - 1.3 mg/dL 0.8    Alkaline Phosphatase      38 - 145 U/L 117    AST      17 - 59 U/L 34    ALT      0 - 50 U/L 26    Anion Gap      8 - 16 mmol/L 5 (L)    eGFR if African American      >60 mL/min/1.73 m:2 >60    eGFR if non African American      >60 mL/min/1.73 m:2 >60    Amphetamine Screen, Ur      Negative Negative    Barbiturate Screen, Ur      Negative Negative    Benzodiazepines      Negative Negative    Cocaine (Metab.)      Negative Negative    Opiate Scrn, Ur      Negative Negative    Phencyclidine      Negative Negative    Marijuana (THC) Metabolite      Negative Negative    Tricyclic Antidepressants (TCA), Urine      Negative Negative    Creatinine, Urine      23.0 - 375.0 mg/dL 45.3    Toxicology Information       SEE COMMENT    Cholesterol      120 - 199 mg/dL 96 (L)    Triglycerides      30 - 150 mg/dL 78    HDL      40 - 75 mg/dL 16 (L)    LDL Cholesterol External      63.0 - 159.0 mg/dL 64.4    HDL/Cholesterol Ratio      20.0 - 50.0 % 16.7 (L)    Total Cholesterol/HDL Ratio      2.0 - 5.0 6.0 (H)    Non-HDL Cholesterol      mg/dL 80    Hemoglobin A1C External      0.0 - 5.6 % 5.0    Estimated Avg Glucose      68 - 131 mg/dL 97    Vit D, 25-Hydroxy      30 - 96 ng/mL 48    D-Dimer      <0.50 ug/mL FEU  <0.27       MRI brain:  FINDINGS:  No gross soft tissue defect or significant susceptibility artifact is appreciated.  There are mild volume loss and periventricular white matter chronic changes bilaterally.  There is a symmetric appearance of the globes, orbits.  There is pansinusitis appearance overall with near complete opacification of the maxillary sinuses.  No interval mass-effect, layering hemorrhage or hydrocephalus is appreciated.  The diffusion-weighted images are negative for acute ischemia currently.  The central vascular flow voids are demonstrated.  There is motion limiting artifact.  Symmetric appearance of the internal auditory canal structures is demonstrated.  No  "significant interval changes are appreciated.     Impression:  No interval mass-effect, hydrocephalus or evidence of acute ischemia.  No significant interval intracranial changes are appreciated with sinusitis appearance overall.  [facility]        Carotid U/S:  Reportedly performed by PCP day before admit.  Will request     EKG:  Atrial fibrillation     TTE (2021):  · "The left ventricle is normal in size with normal systolic function.  · Normal left ventricular diastolic function.  · Normal right ventricular size with normal right ventricular systolic function.  · Successful cardioversion at 120 joules restoring normal sinus rhythm  · The estimated ejection fraction is 60%.  · A 120 J synchronized cardioversion was successfully performed with restoration of normal sinus rhythm."                   Assessment and Plan:      Problem List Items Addressed This Visit        Neuro    TIA (transient ischemic attack) - Primary    Current Assessment & Plan     Patient is a 40 y/o male that presents for hospital follow up.   He reported transient right eye blurry vision and left hand numbness that lasted ~ 45 mins.   CTH neg acute  MRI brain scan neg acute  CUS done by PCP day before admit and reported to be normal   - will request records  Pt with known h/o afib    - cardioversion x 2 in 2019  Pt was not previously consistent with taking ASA  Continue Eliquis BID    Control stroke risk factors:   - BP/DM/Cholesterol management as per PCP   - agree with statin for LDL control    Recommend pt f/u wth cards as directed as well as Neuro ophth for eval    Stroke education provided                  Cardiac/Vascular    Persistent atrial fibrillation    Current Assessment & Plan     As seen on recent EKG  Cardioversion x 2 in 2019  Cont Eliquis                                 Important to note, also  has a past medical history of Allergy, Asthma, Atrial fibrillation, " Blind loop syndrome, Intestinal atresia, Pancreas divisum, Personal history of COVID-19, and Short bowel syndrome.          The patient will return to clinic as needed    All questions were answered and patient is comfortable with the plan.         Thank you very much for the opportunity to assist in this patient's care.    If you have any questions or concerns, please do not hesitate to contact me at any time.      Sincerely,     ONESIMO Perry  [facility] [place]         YUDI spent a total of 43 minutes on the day of the visit.This includes face to face time and non-face to face time preparing to see the patient (eg, review of tests), Obtaining and/or reviewing separately obtained history, Documenting clinical information in the electronic or other health record, Independently interpreting resultsand communicating results to the patient/family/caregiver, or Care coordination.

## 2022-08-03 ENCOUNTER — TELEPHONE (OUTPATIENT)
Dept: NEUROLOGY | Facility: CLINIC | Age: 42
End: 2022-08-03
Payer: MEDICAID

## 2022-08-03 NOTE — TELEPHONE ENCOUNTER
Medical records received from: Dr. Velasco    A COPY MADE  The copy placed in Shi Mcdaniel's office  ORIGINAL SENT TO SCANNING

## 2023-01-03 PROBLEM — Z71.89 ACP (ADVANCE CARE PLANNING): Status: RESOLVED | Noted: 2022-06-14 | Resolved: 2023-01-03

## 2023-01-04 ENCOUNTER — OCCUPATIONAL HEALTH (OUTPATIENT)
Dept: URGENT CARE | Facility: CLINIC | Age: 43
End: 2023-01-04

## 2023-01-04 DIAGNOSIS — Z00.00 ENCOUNTER FOR PHYSICAL EXAMINATION: Primary | ICD-10-CM

## 2023-01-04 PROCEDURE — 99499 COAST GUARD PHYSICAL: ICD-10-PCS | Mod: S$GLB,,, | Performed by: PHYSICIAN ASSISTANT

## 2023-01-04 PROCEDURE — 80305 DRUG TEST PRSMV DIR OPT OBS: CPT | Mod: S$GLB,,, | Performed by: PHYSICIAN ASSISTANT

## 2023-01-04 PROCEDURE — 80305 OOH COLLECTION ONLY DRUG SCREEN: ICD-10-PCS | Mod: S$GLB,,, | Performed by: PHYSICIAN ASSISTANT

## 2023-01-04 PROCEDURE — 99499 UNLISTED E&M SERVICE: CPT | Mod: S$GLB,,, | Performed by: PHYSICIAN ASSISTANT

## 2023-06-01 PROBLEM — I48.19 PERSISTENT ATRIAL FIBRILLATION: Status: RESOLVED | Noted: 2018-10-19 | Resolved: 2023-06-01

## 2023-06-01 PROBLEM — I48.0 PAROXYSMAL A-FIB: Status: RESOLVED | Noted: 2022-06-14 | Resolved: 2023-06-01

## 2023-06-01 PROBLEM — I48.20 CHRONIC ATRIAL FIBRILLATION: Status: ACTIVE | Noted: 2023-06-01

## 2023-06-01 PROBLEM — I48.0 PAROXYSMAL ATRIAL FIBRILLATION: Status: RESOLVED | Noted: 2021-11-18 | Resolved: 2023-06-01

## 2023-12-15 PROBLEM — R05.9 COUGH: Status: RESOLVED | Noted: 2023-12-15 | Resolved: 2023-12-15

## 2023-12-15 PROBLEM — I48.91 A-FIB: Status: ACTIVE | Noted: 2023-06-01

## 2023-12-15 PROBLEM — R05.9 COUGH: Status: ACTIVE | Noted: 2023-12-15

## 2023-12-15 PROBLEM — J18.9 ACUTE PNEUMONIA: Status: RESOLVED | Noted: 2023-12-15 | Resolved: 2023-12-15

## 2023-12-15 PROBLEM — J18.9 ACUTE PNEUMONIA: Status: ACTIVE | Noted: 2023-12-15

## 2024-09-16 ENCOUNTER — TELEPHONE (OUTPATIENT)
Dept: GASTROENTEROLOGY | Facility: CLINIC | Age: 44
End: 2024-09-16
Payer: MEDICAID

## 2024-09-16 NOTE — TELEPHONE ENCOUNTER
----- Message from Dima Nash sent at 9/16/2024  8:30 AM CDT -----  Type:  Sooner Apoointment Request    Caller is requesting a sooner appointment.  Caller declined first available appointment listed below.  Caller will not accept being placed on the waitlist and is requesting a message be sent to doctor.  Name of Caller: Spouse Brittaney  When is the first available appointment? N/a  Symptoms: fatigue / pain  Would the patient rather a call back or a response via [facility]? call back/  Best Call Back Number:[phone]   Additional Information:  Spouse states pt is feeling uncomfortable and fatigue.  Spouse states pt hasn't seen provider since sx  Please advise  Thanks

## (undated) DEVICE — SEE MEDLINE ITEM 156902

## (undated) DEVICE — SUT SILK 2-0 STRANDS 30IN

## (undated) DEVICE — SUT SILK 3-0 STRANDS 30IN

## (undated) DEVICE — SUT 1 48IN PDS II VIO MONO

## (undated) DEVICE — SUT SILK 2-0 SH 18IN BLACK

## (undated) DEVICE — SYR 30CC LUER LOCK

## (undated) DEVICE — PAD K-THERMIA 24IN X 60IN

## (undated) DEVICE — NDL 20GX1-1/2IN IB

## (undated) DEVICE — DRAPE ABDOMINAL TIBURON 14X11

## (undated) DEVICE — Device

## (undated) DEVICE — SEE MEDLINE ITEM 157144

## (undated) DEVICE — ELECTRODE REM PLYHSV RETURN 9

## (undated) DEVICE — STAPLER SKIN PROXIMATE WIDE

## (undated) DEVICE — SUT 3-0 12-18IN SILK

## (undated) DEVICE — CUTTER PROXIMATE BLUE 75MM

## (undated) DEVICE — SUT 2-0 12-18IN SILK

## (undated) DEVICE — SUT 4/0 27IN PDS II VIO MON

## (undated) DEVICE — SUT VICRYL PLUS 3-0 SH 18IN

## (undated) DEVICE — CONTAINER SPECIMEN STRL 4OZ

## (undated) DEVICE — TRAY MINOR GEN SURG

## (undated) DEVICE — TRAY FOLEY 16FR INFECTION CONT

## (undated) DEVICE — SPONGE IV DRAIN 4X4 STERILE

## (undated) DEVICE — SUT SILK 3-0 SH 18IN BLACK

## (undated) DEVICE — SEE MEDLINE ITEM 152622

## (undated) DEVICE — RELOAD PROXIMATE CUT BLUE 75MM

## (undated) DEVICE — DRESSING MEPORE ADH 3.5X12

## (undated) DEVICE — APPLIER CLIP LIAGCLIP 9.375IN

## (undated) DEVICE — GOWN SURGICAL X-LARGE

## (undated) DEVICE — TUBE FEEDING JEJUNAL 12FR 60CM

## (undated) DEVICE — SEALER LIGASURE MARYLAND 23CM

## (undated) DEVICE — GOWN SMART IMP BREATHABLE XXLG

## (undated) DEVICE — SEE MEDLINE ITEM 157148

## (undated) DEVICE — SEE MEDLINE ITEM 157128